# Patient Record
Sex: FEMALE | Race: WHITE | Employment: OTHER | URBAN - NONMETROPOLITAN AREA
[De-identification: names, ages, dates, MRNs, and addresses within clinical notes are randomized per-mention and may not be internally consistent; named-entity substitution may affect disease eponyms.]

---

## 2019-05-24 ENCOUNTER — HOSPITAL ENCOUNTER (OUTPATIENT)
Dept: PHYSICAL THERAPY | Age: 59
Setting detail: THERAPIES SERIES
Discharge: HOME | End: 2019-05-24
Payer: MEDICARE

## 2019-05-24 PROCEDURE — 97110 THERAPEUTIC EXERCISES: CPT

## 2019-05-24 PROCEDURE — 97116 GAIT TRAINING THERAPY: CPT

## 2019-05-24 PROCEDURE — 97161 PT EVAL LOW COMPLEX 20 MIN: CPT

## 2019-05-24 ASSESSMENT — PAIN SCALES - GENERAL: PAINLEVEL_OUTOF10: 7

## 2019-05-24 ASSESSMENT — PAIN DESCRIPTION - FREQUENCY: FREQUENCY: INTERMITTENT

## 2019-05-24 ASSESSMENT — PAIN DESCRIPTION - ORIENTATION: ORIENTATION: LEFT

## 2019-05-24 ASSESSMENT — PAIN DESCRIPTION - PAIN TYPE: TYPE: SURGICAL PAIN

## 2019-05-24 ASSESSMENT — PAIN DESCRIPTION - LOCATION: LOCATION: KNEE

## 2019-05-24 NOTE — FLOWSHEET NOTE
** PLEASE SIGN, DATE AND TIME CERTIFICATION BELOW AND RETURN TO [facility] OUTPATIENT REHABILITATION (FAX #: [phone]). ATTEST/CO-SIGN IF ACCESSING VIA INToldo. THANK YOU.**    I certify that I have examined the patient below and determined that Physical Medicine and Rehabilitation service is necessary and that I approve the established plan of care for up to 90 days or as specifically noted. Attestation, signature or co-signature of physician indicates approval of certification requirements.    ________________________ ____________ __________  Physician Signature   Date   Time       [address]     Time In: 2581  Time Out: 7351  Minutes: 43  Timed Code Treatment Minutes: 23 Minutes                Date: 2019  Patient Name: Carol Yun,  Gender:  female        CSN: [id number]   : 1960  (59 y.o.)        Referring Practitioner: Mary Jane Holbrook MD      Diagnosis: M17.12 (ICD-10-CM) - Unilateral primary osteoarthritis, left knee  Treatment Diagnosis: left  knee pain s/p arthroscopy with difficulty walking. Additional Pertinent Hx: comorbidities: COPD, chronic back pain, fibromyalgia, DDD,  emphysema, OA. left 19 for s/p left knee arthroscopy, PMM, AA, left patellar instability. 19        General:  PT Visit Information  Onset Date: 19  PT Insurance Information: Medicare unlimited visits based on medical necessity. aquatic yes modalities yes iontophoresis, HP and CP are not covered. no precertification  Total # of Visits to Date: 1  Plan of Care/Certification Expiration Date: 19  Progress Note Counter: 1/10 for PN           See Medical History Questionnaire for information related to allergies and medications. Subjective:  Chart Reviewed: Yes  Family / Caregiver Present: No  Comments:  Follow up with Dr. Agueda Hoover on 19      Subjective: Patient reports that she had left knee scope on 5/22/19. States that she has problems with knee for more than 5 years. She had Synvisc injections 5-6 years ago to left knee which helped. Has first knee scope 5 years ago. Notes that knee cap goes out of place at times. Patient had recent knee scope due to increased left knee pain and difficulty with walking, and doing activities around home. Difficulty sleeping. Now since scope, she has had some increased pain yesterday since surgery. Left knee pain today located front of knee, outer knee and back of knee. Limited sitting, standing and walking tolerance. Patient has noted any drainage through dressings. Walking with single crutch today       Pain:  Patient Currently in Pain: Yes  Pain Assessment: 0-10  Pain Level: 7  Pain Type: Surgical pain  Pain Location: Knee  Pain Orientation: Left  Pain Frequency: Intermittent     Social/Functional History:    Lives With: Alone  Type of Home: House  Home Layout: One level  Home Access: Ramped entrance  Home Equipment: Crutches     Bathroom Shower/Tub: Walk-in shower  Bathroom Toilet: Handicap height  Bathroom Equipment: Grab bars in shower  Bathroom Accessibility: Accessible       ADL Assistance: Independent  Homemaking Assistance: Independent  Ambulation Assistance: Independent  Transfer Assistance: Independent    Active : Yes  Mode of Transportation: Car  Occupation: Retired, On disability  Leisure & Hobbies: likes go garden, craft, watch movies, cook    Objective                            Observation/Palpation  Observation: Note no drainage at incisional sites. Note mild redness at lateral distal incisional site. Note stitches/sutures in place. Patient ambulates with single axillary crutch back to clinic. Edema: Note circumferential measurements: right knee 38 cm, left knee 39 cm at joint line. ROM RLE: Right knee flexion 0 degrees to 138 degrees flexion  ROM LLE: Left knee flexion  20 degrees to 100 degrees flexion.      Comment: Strength RLE WNLS 5/5 at hip, knee and ankle    Comment: Strength LLE hip flexors 3+/5, knee MMT not assessed due to recent surgery. Quad lag 20 degrees. ankle df 5/5. Tone RLE  RLE Tone: Normotonic  Tone LLE  LLE Tone: Normotonic       Special Tests: Negative Veronica's. Mild warmth at knee but no significant redness. No warmth at calf. Overall Sensation Status: WNL           Rolling to Left: Independent  Rolling to Right: Independent  Supine to Sit: Independent  Sit to Supine: Independent                Transfers  Sit to Stand: Modified independent  Stand to sit: Modified independent  Comment: Use of armrests of chair to sit<>stand. Patient does not flex left knee to wt shift fully through it. WB Status: WBAT LLE  Ambulation 1  Surface: carpet  Device: Forearm Crutches(single crutch)  Assistance: Modified Independent  Quality of Gait: Note decreased stance time through LLE, decreased knee flexion left knee through swing phase,   Distance: within clinic 60 feet, 40 feet x2      Stairs  # Steps : 4  Stairs Height: 6\"  Rails: Bilateral  Assistance: Modified independent   Comment: nonreciprocal pattern            Balance  Posture: Good  Sitting - Static: Good  Sitting - Dynamic: Good  Standing - Static: Fair, +  Standing - Dynamic: Poor  Comments: s/p arthroscopy 5/22/19  Uses single crutch for static standing and ambulation. Exercises  Exercise 1: Removed dressings. Note incisional sites healing well. No drainage. Exercise 2: ankle pumps, quad sets, gluteal sets x10 reps. Exercise 3: heelslides x5, ROM knee flexion 105 degrees . Exercise 4: SAQS x10   Exercise 5: assisted SLR 10% assist x5  (hip and groin pain) limited reps   Exercise 6: assisted hip abduction supinelying x5.  hip and groin pain limited reps. Exercise 7: seated knee flexion x10, flexion to 100 degrees. Exercise 8: ASsisted knee extension with opposite leg x5. Exercise 10: Gait training with single crutch.  Advised patient to place it Under right axilla instead of Left axilla  Exercise 11: Step training. Patient educated and demonstrated how to negotiate steps ascending and descending. SBA of one person first trial.  instructed in non reciprocal technque till strength , ROM improve. Activity Tolerance:  Activity Tolerance: Patient Tolerated treatment well, Patient limited by pain  Activity Tolerance: Patient had pain complaints at left hip and groin with SLR, walking, standing and did not tolerate supinelying well. Assessment: Body structures, Functions, Activity limitations: Decreased functional mobility , Decreased ADL status, Decreased ROM, Decreased strength, Decreased balance, Increased Pain  Assessment: 59year old female s/p left knee arthroscopy 5/22/19 diagnosed with meniscus tears and patellar instability. Patient having increased left hip and groin pain today. Did not tolerate SLR, hip abduction. Preferred to sit in chair vs supinelying on mat table. ROM 0-105 degrees flexion. left knee. Will continue 2x per week to address deficits in ROM, gait pattern/deviations, strength. Prognosis: Good  Discharge Recommendations: Continue to assess pending progress    Patient Education:  Patient Education: Patient educated in plan of care. Issued HEP of ex instructed in this session. Advised to use ice on left knee and elevate during day for swelling and pain. 3x per day 15-20 minutes. Do not aggravate left hip pain  Hold on ex that may aggravate her hip pain when doing the knee ex. Plan:  Times per week: 2x  Plan weeks: 8 weeks  Specific instructions for Next Treatment: s/p arthroscopy surgery 5/22/19 left knee. patellar instability also. work on [employer] with quad strengthening, ROM, balance.  (patient also having left hip pain with back pain, Inguinal and hip discomfort along with back pain) monitor pain symptoms.    Current Treatment Recommendations: Strengthening, ROM, Balance Training, Functional Mobility Training, Transfer Training, Stair training, Gait Training, Home Exercise Program, Pain Management    History: Personal factors or comorbidities that impact plan of care - High Complexity: 3 or more personal factors or comorbidities. See history section above for details. Examination: Body structures and functions, activity limitations, participation restrictions; using standardized tests and measures - Moderate Complexity: 3 or morebody structures and functional, activity limitations and/or participation restrictions. See restrictions and objective section above for details. Clinical Presentation: Moderate - Evolving with Changing Characteristics: Patient having hip and low back pain along with s/p arthroscopy left knee. Monitor these additional symptoms that can be aggravated with knee exercises. Decision Making: Low Complexity due to Patient s/p arthroscopy left knee demonstrating limited ROM, gait deviations and decreased strength. Decision making was based on patient assessment and decision making process in determining plan of care and establishing reasonable expectations for measurable functional outcomes. Evaluation Complexity: Based on the findings of patient history, examination, clinical presentation, and decision making during this evaluation, the evaluation of Gladys Pepper  is of low complexity. Goals:  Patient goals : Patient would like to be able to mow the lawn and do more yardwork without knee pain. Walk better. Short term goals  Time Frame for Short term goals: 4 weeks  Short term goal 1: Patient to report of decreased left knee pain from 7/10 to no greater than 3/10 with standing and walking, supinelying  Short term goal 2: Patient to demonstrate increased left knee ROM from 0 degrees to 130 degrees flexion with imporved transfer skill, gait pattern, negotiating steps.    Short term goal 3: Patient to demonstrate increased strength LLE with ability to complete SLR 4/5 muscle grades, increased knee stability with strength 4/5 with improved walking pattern. Short term goal 4: Patient to ambulate without single axillary crutch with equal weight shift, equal step length and knee flexion through swing phase when ambulating on level surfaces. Long term goals  Time Frame for Long term goals : 8 weeks  Long term goal 1: Patient independent in home ex program in order to meet above goals with increased ROM, strength, improved walking pattern to return to activities at home mowing lawn, negotiating step, and gardening.      Allyssa Lucas, [address]

## 2019-05-29 ENCOUNTER — HOSPITAL ENCOUNTER (OUTPATIENT)
Dept: PHYSICAL THERAPY | Age: 59
Setting detail: THERAPIES SERIES
Discharge: HOME | End: 2019-05-29
Payer: MEDICARE

## 2019-05-29 PROCEDURE — 97110 THERAPEUTIC EXERCISES: CPT

## 2019-05-29 ASSESSMENT — PAIN SCALES - GENERAL: PAINLEVEL_OUTOF10: 5

## 2019-05-29 ASSESSMENT — PAIN DESCRIPTION - ORIENTATION: ORIENTATION: LEFT

## 2019-05-29 ASSESSMENT — PAIN DESCRIPTION - PAIN TYPE: TYPE: SURGICAL PAIN

## 2019-05-29 ASSESSMENT — PAIN DESCRIPTION - LOCATION: LOCATION: KNEE

## 2019-05-29 NOTE — PROGRESS NOTES
[place]     Time In: 6486  Time Out: 0494  Minutes: 30  Timed Code Treatment Minutes: 30 Minutes           Date: 2019  Patient Name: Josh Jo,  Gender:  female        CSN: [id number]   : 1960  (59 y.o.)       Referring Practitioner: Tessy Flores MD      Diagnosis: M17.12 (ICD-10-CM) - Unilateral primary osteoarthritis, left knee  Treatment Diagnosis: left  knee pain s/p arthroscopy with difficulty walking. Additional Pertinent Hx: comorbidities: COPD, chronic back pain, fibromyalgia, DDD,  emphysema, OA. left 19 for s/p left knee arthroscopy, PMM, AA, left patellar instability. 19        General:  PT Visit Information  Onset Date: 19  PT Insurance Information: Medicare unlimited visits based on medical necessity. aquatic yes modalities yes iontophoresis, HP and CP are not covered. no precertification  Total # of Visits to Date: 2  Plan of Care/Certification Expiration Date: 19  Progress Note Counter: 2/10 for PN                 Subjective:  Chart Reviewed: Yes  Family / Caregiver Present: No  Comments: Follow up with Dr. Lenny Nicholson on 19      Subjective: Patient presents without assistive device stating her knee feels better, \" my balance is good and it doesn't hurt my knee to not use anything. \" States she has a \"nagging pain to left groin and hip. \"     Pain:  Patient Currently in Pain: Yes  Pain Assessment: 0-10  Pain Level: 5  Pain Type: Surgical pain  Pain Location: Knee  Pain Orientation: Left  Pain Radiating Towards: groin and hip  \"nagging pain. \"    Objective  Exercises  Exercise 3: heelslides x10, ROM knee flexion 130 degrees . Exercise 5: SLR x 10 L with no assist but reported discomfort but didn't want to stop. Exercise 6: assisted hip abduction supinelying x  10.       Exercise 12: Nustep S9, A9 L1 x 5 minutes  Exercise 13: Stretches at step knee flexion, hamstring and calf x 3 x 10 L. Exercise 14: Standing hel/toe raises, marches, squats, 3 way hip x 10  Exercise 15: Rockerboard x 2 directions B UE, balance x30 seconds 1 UE. Activity Tolerance: Tolerated well. Assessment: Body structures, Functions, Activity limitations: Decreased strength, Decreased endurance, Decreased functional mobility , Decreased ROM, Decreased balance  Assessment: Patient presented feeling much better. Intiated Nustep, stretches and standing ex with good tolerance. Significant gains with AROM left knee to 130. Pain after session 4/10. Discharge Recommendations: Continue to assess pending progress    Patient Education:  Patient Education: Standing ex. Continue with mat ex also. Plan:  Times per week: 2x  Plan weeks: 8 weeks  Specific instructions for Next Treatment: s/p arthroscopy surgery 5/22/19 left knee. patellar instability also. work on [employer] with quad strengthening, ROM, balance.  (patient also having left hip pain with back pain, Inguinal and hip discomfort along with back pain) monitor pain symptoms. Current Treatment Recommendations: Strengthening, ROM, Balance Training, Functional Mobility Training, Transfer Training, Stair training, Gait Training, Home Exercise Program, Pain Management    Goals:  Patient goals : Patient would like to be able to mow the lawn and do more yardwork without knee pain. Walk better. Short term goals  Time Frame for Short term goals: 4 weeks  Short term goal 1: Patient to report of decreased left knee pain from 7/10 to no greater than 3/10 with standing and walking, supinelying  Short term goal 2: Patient to demonstrate increased left knee ROM from 0 degrees to 130 degrees flexion with imporved transfer skill, gait pattern, negotiating steps.    Short term goal 3: Patient to demonstrate increased strength LLE with ability to complete SLR 4/5 muscle grades, increased knee stability with strength 4/5 with improved walking pattern. Short term goal 4: Patient to ambulate without single axillary crutch with equal weight shift, equal step length and knee flexion through swing phase when ambulating on level surfaces. Long term goals  Time Frame for Long term goals : 8 weeks  Long term goal 1: Patient independent in home ex program in order to meet above goals with increased ROM, strength, improved walking pattern to return to activities at home mowing lawn, negotiating step, and gardening.      Grady Greek  PTA [id number]

## 2019-05-31 ENCOUNTER — HOSPITAL ENCOUNTER (OUTPATIENT)
Dept: PHYSICAL THERAPY | Age: 59
Setting detail: THERAPIES SERIES
Discharge: HOME | End: 2019-05-31
Payer: MEDICARE

## 2019-05-31 PROCEDURE — 97110 THERAPEUTIC EXERCISES: CPT

## 2019-05-31 ASSESSMENT — PAIN SCALES - GENERAL: PAINLEVEL_OUTOF10: 5

## 2019-05-31 ASSESSMENT — PAIN DESCRIPTION - ORIENTATION: ORIENTATION: LEFT

## 2019-05-31 ASSESSMENT — PAIN DESCRIPTION - LOCATION: LOCATION: KNEE

## 2019-05-31 ASSESSMENT — PAIN DESCRIPTION - PAIN TYPE: TYPE: SURGICAL PAIN

## 2019-05-31 NOTE — PROGRESS NOTES
[place]     Time In: 0547  Time Out: Juan Antonio  Minutes: 38  Timed Code Treatment Minutes: 38 Minutes     Date: 2019  Patient Name: Ana Cristina Gimenez,  Gender:  female        CSN: [id number]   : 1960  (59 y.o.)       Referring Practitioner: Preeti Pacheco MD      Diagnosis: M17.12 (ICD-10-CM) - Unilateral primary osteoarthritis, left knee  Treatment Diagnosis: left  knee pain s/p arthroscopy with difficulty walking. Additional Pertinent Hx: comorbidities: COPD, chronic back pain, fibromyalgia, DDD,  emphysema, OA. left 19 for s/p left knee arthroscopy, PMM, AA, left patellar instability. 19        General:  PT Visit Information  Onset Date: 19  PT Insurance Information: Medicare unlimited visits based on medical necessity. aquatic yes modalities yes iontophoresis, HP and CP are not covered. no precertification  Total # of Visits to Date: 3  Plan of Care/Certification Expiration Date: 19  Progress Note Counter: 3/10 for PN                 Subjective:  Chart Reviewed: Yes  Patient assessed for rehabilitation services?: Yes  Family / Caregiver Present: No  Comments: Follow up with Dr. Silvio Chaves on 19      Subjective: Patient states her groin fels much better. Reports right lateral knee pain at 5/10. Presents using crutch but states, \" I trip over it but thought you wanted me to use it. \"      Pain:  Patient Currently in Pain: Yes  Pain Assessment: 0-10  Pain Level: 5  Pain Type: Surgical pain  Pain Location: Knee  Pain Orientation: Left  Pain Radiating Towards: left lateral knee. Objective            Exercises  Exercise 3: heelslides x10,  quad sets x 10, ROM knee flexion 134 degrees . Exercise 5: SLR x 10 L with mild groin pain no quad lag. Exercise 12: Nustep S9, A9 L1 x 5 minutes  Exercise 13: Stretches at step knee flexion, hamstring and calf x 3 x 10 L.    Exercise 14: Standing hel/toe raises, marches, squats,  ham curls, 3 way hip x 10  Exercise 15: Rockerboard x 2 directions B UE, balance x30 seconds 1 UE. Exercise 16: LAQ with ball between knees x 10  Exercise 17: Adductor squeeze with ball in hooklying  x 10 hold x 5  Exercise 18: Shallow wall squats with ball x 10 hold x 5. Exercise 19: CCRight step ups 4\" x 10 forward       Activity Tolerance: Tolerated well. Assessment: Body structures, Functions, Activity limitations: Decreased strength, Decreased ADL status, Decreased balance, Decreased ROM, Decreased endurance  Assessment: Patient continue to achieve gains with left knee flex. AROM 0 to 134. Added CCsteps ups and VMO strenthening ex. Tolerated well. Discharge Recommendations: Continue to assess pending progress    Patient Education:  Patient Education: Standing ex. Continue with mat ex also. Plan:  Times per week: 2x  Plan weeks: 8 weeks  Specific instructions for Next Treatment: s/p arthroscopy surgery 5/22/19 left knee. patellar instability also. work on [employer] with quad strengthening, ROM, balance.  (patient also having left hip pain with back pain, Inguinal and hip discomfort along with back pain) monitor pain symptoms. Current Treatment Recommendations: Strengthening, ROM, Balance Training, Functional Mobility Training, Transfer Training, Stair training, Gait Training, Home Exercise Program, Pain Management    Goals:  Patient goals : Patient would like to be able to mow the lawn and do more yardwork without knee pain. Walk better. Short term goals  Time Frame for Short term goals: 4 weeks  Short term goal 1: Patient to report of decreased left knee pain from 7/10 to no greater than 3/10 with standing and walking, supinelying  Short term goal 2: Patient to demonstrate increased left knee ROM from 0 degrees to 130 degrees flexion with imporved transfer skill, gait pattern, negotiating steps.    Short term goal 3: Patient to demonstrate increased strength LLE with ability to complete SLR 4/5 muscle grades, increased knee stability with strength 4/5 with improved walking pattern. Short term goal 4: Patient to ambulate without single axillary crutch with equal weight shift, equal step length and knee flexion through swing phase when ambulating on level surfaces. Long term goals  Time Frame for Long term goals : 8 weeks  Long term goal 1: Patient independent in home ex program in order to meet above goals with increased ROM, strength, improved walking pattern to return to activities at home mowing lawn, negotiating step, and gardening.      Kaitlynn Mercado  PTA [id number]

## 2019-06-03 ENCOUNTER — HOSPITAL ENCOUNTER (OUTPATIENT)
Dept: PHYSICAL THERAPY | Age: 59
Setting detail: THERAPIES SERIES
Discharge: HOME | End: 2019-06-03
Payer: MEDICARE

## 2019-06-03 PROCEDURE — 97110 THERAPEUTIC EXERCISES: CPT

## 2019-06-03 ASSESSMENT — PAIN SCALES - GENERAL: PAINLEVEL_OUTOF10: 5

## 2019-06-03 NOTE — PROGRESS NOTES
[facility]     Time In: 1115  Time Out: 1155  Minutes: 40  Timed Code Treatment Minutes: 40 Minutes     Date: 6/3/2019  Patient Name: Poppy Arellano,  Gender:  female        CSN: [id number]   : 1960  (59 y.o.)       Referring Practitioner: Gretta Wynn MD      Diagnosis: M17.12 (ICD-10-CM) - Unilateral primary osteoarthritis, left knee  Treatment Diagnosis: left  knee pain s/p arthroscopy with difficulty walking. Additional Pertinent Hx: comorbidities: COPD, chronic back pain, fibromyalgia, DDD,  emphysema, OA. left 19 for s/p left knee arthroscopy, PMM, AA, left patellar instability. 19        General:  PT Visit Information  Onset Date: 19  PT Insurance Information: Medicare unlimited visits based on medical necessity. aquatic yes modalities yes iontophoresis, HP and CP are not covered. no precertification  Total # of Visits to Date: 4  Plan of Care/Certification Expiration Date: 19  Progress Note Counter: 4/10 for PN               Subjective:  Chart Reviewed: Yes  Patient assessed for rehabilitation services?: Yes  Family / Caregiver Present: No  Comments: Follow up with Dr. Hilton Service on 19      Subjective: Patient reports she parked further away today and did ok walking. States she is doing well without her crutch. Pain:  Patient Currently in Pain: Yes  Pain Assessment: 0-10  Pain Level:  5(Left knee)    Objective    Exercises  Exercise 1: Nustep (Seat 9/Arms 9) Level 2 x6 minutes  Exercise 2: Stretches at step knee flexion, hamstring and calf 3x 15 seconds Left  Exercise 4: Demonstrated bridge with ball between knees and straight leg raises with external rotation for added VMO strengthening for HEP  Exercise 6: Standing heel/toe raises, marches, squats, hamstring curls, 3-way hip x15 each Bilateral   Exercise 7: Left Closed chain step ups 4\" forward/lateral x15 each  Exercise 8: Shallow wall squats with ball 10x hold 5 seconds. Exercise 9: Seated Adductor squeeze with ball 15x 5 second hold  Exercise 10: LAQ with ball between knees x15  Exercise 11: Rockerboard forward/back, left/right 15x each with 1 UE, balance x30 seconds 1 UE. Activity Tolerance:  Activity Tolerance: Patient Tolerated treatment well, Patient limited by pain    Assessment: Body structures, Functions, Activity limitations: Decreased strength, Decreased ADL status, Decreased balance, Decreased ROM, Decreased endurance  Assessment: Good tolerance of exercises today. Pain remained 5/10 at end of session and paitent denies any hip/groin pain. Discharge Recommendations: Continue to assess pending progress    Patient Education:  Patient Education: Continue HEP, added bridge with ball between knees, SLR with ER    Plan:  Times per week: 2x  Plan weeks: 8 weeks  Specific instructions for Next Treatment: s/p arthroscopy surgery 5/22/19 left knee. patellar instability also. work on [employer] with quad strengthening, ROM, balance.  (patient also having left hip pain with back pain, Inguinal and hip discomfort along with back pain) monitor pain symptoms. Current Treatment Recommendations: Strengthening, ROM, Balance Training, Functional Mobility Training, Transfer Training, Stair training, Gait Training, Home Exercise Program, Pain Management  Plan Comment: Continue with current POC    Goals:  Patient goals : Patient would like to be able to mow the lawn and do more yardwork without knee pain. Walk better. Short term goals  Time Frame for Short term goals: 4 weeks  Short term goal 1: Patient to report of decreased left knee pain from 7/10 to no greater than 3/10 with standing and walking, supinelying  Short term goal 2: Patient to demonstrate increased left knee ROM from 0 degrees to 130 degrees flexion with imporved transfer skill, gait pattern, negotiating steps.    Short term goal 3: Patient to demonstrate increased strength LLE with ability to complete SLR 4/5 muscle grades, increased knee stability with strength 4/5 with improved walking pattern. Short term goal 4: Patient to ambulate without single axillary crutch with equal weight shift, equal step length and knee flexion through swing phase when ambulating on level surfaces. Long term goals  Time Frame for Long term goals : 8 weeks  Long term goal 1: Patient independent in home ex program in order to meet above goals with increased ROM, strength, improved walking pattern to return to activities at home mowing lawn, negotiating step, and gardening. Foster [employer].  Kamille, [facility] [place], 6/3/2019

## 2019-06-06 ENCOUNTER — HOSPITAL ENCOUNTER (OUTPATIENT)
Dept: PHYSICAL THERAPY | Age: 59
Setting detail: THERAPIES SERIES
Discharge: HOME | End: 2019-06-06
Payer: MEDICARE

## 2019-06-06 PROCEDURE — 97110 THERAPEUTIC EXERCISES: CPT

## 2019-06-06 ASSESSMENT — PAIN DESCRIPTION - PAIN TYPE: TYPE: SURGICAL PAIN

## 2019-06-06 ASSESSMENT — PAIN DESCRIPTION - ORIENTATION: ORIENTATION: LEFT

## 2019-06-06 ASSESSMENT — PAIN DESCRIPTION - LOCATION: LOCATION: KNEE

## 2019-06-06 ASSESSMENT — PAIN SCALES - GENERAL: PAINLEVEL_OUTOF10: 5

## 2019-06-06 NOTE — PROGRESS NOTES
[place]     Time In: 5473  Time Out: 1524  Minutes: 39  Timed Code Treatment Minutes: 39 Minutes                Date: 2019  Patient Name: Jin Oshea,  Gender:  female        CSN: [id number]   : 1960  (59 y.o.)       Referring Practitioner: Theodore Hurt MD      Diagnosis: M17.12 (ICD-10-CM) - Unilateral primary osteoarthritis, left knee  Treatment Diagnosis: left  knee pain s/p arthroscopy with difficulty walking. Additional Pertinent Hx: comorbidities: COPD, chronic back pain, fibromyalgia, DDD,  emphysema, OA. left 19 for s/p left knee arthroscopy, PMM, AA, left patellar instability. 19                   General:  PT Visit Information  Onset Date: 19  PT Insurance Information: Medicare unlimited visits based on medical necessity. aquatic yes modalities yes iontophoresis, HP and CP are not covered. no precertification  Total # of Visits to Date: 5  Plan of Care/Certification Expiration Date: 19  Progress Note Counter: 5/10 for PN                 Subjective:  Chart Reviewed: Yes  Patient assessed for rehabilitation services?: Yes  Family / Caregiver Present: No  Comments: Follow up with Dr. Pricila Rea on 19      Subjective: It's not bad. 5/10. 0 AD. Pain:  Patient Currently in Pain: Yes  Pain Level: 5  Pain Type: Surgical pain  Pain Location: Knee  Pain Orientation: Left      Objective         Exercises  Exercise 1: Nustep (Seat 9/Arms 9) Level 2 x6 minutes  Exercise 2: Stretches at step knee flexion, hamstring and calf 3x 15 seconds Left  Exercise 6: Standing heel/toe raises, marches, squats, hamstring curls x15 on foam , 3-way hip x each Bilateral peach band  Exercise 7: Left Closed chain step ups 4\" forward/lateral x15 each  x10 eccentric   Exercise 8: Shallow wall squats with ball 10x hold 5 seconds. Pt not performing at home -\"I don't have a wall.  My doors don't with imporved transfer skill, gait pattern, negotiating steps. Short term goal 3: Patient to demonstrate increased strength LLE with ability to complete SLR 4/5 muscle grades, increased knee stability with strength 4/5 with improved walking pattern. Short term goal 4: Patient to ambulate without single axillary crutch with equal weight shift, equal step length and knee flexion through swing phase when ambulating on level surfaces. Long term goals  Time Frame for Long term goals : 8 weeks  Long term goal 1: Patient independent in home ex program in order to meet above goals with increased ROM, strength, improved walking pattern to return to activities at home mowing lawn, negotiating step, and gardening.      Charlene Rebolledo  [id number]

## 2019-06-10 ENCOUNTER — HOSPITAL ENCOUNTER (OUTPATIENT)
Dept: PHYSICAL THERAPY | Age: 59
Setting detail: THERAPIES SERIES
Discharge: HOME | End: 2019-06-10
Payer: MEDICARE

## 2019-06-10 PROCEDURE — 97110 THERAPEUTIC EXERCISES: CPT

## 2019-06-10 ASSESSMENT — PAIN SCALES - GENERAL: PAINLEVEL_OUTOF10: 3

## 2019-06-10 NOTE — DISCHARGE SUMMARY
strength LLE with ability to complete SLR 4/5 muscle grades, increased knee stability with strength 4/5 with improved walking pattern. GOAL MET Strength 4/5 SLR, 5/5 knee,  left. Short term goal 4: Patient to ambulate without single axillary crutch with equal weight shift, equal step length and knee flexion through swing phase when ambulating on level surfaces. GOAL MET Patient ambulating without assistive device and not normal gait pattern with equal wt shifts and step length. Long term goals  Time Frame for Long term goals : 8 weeks  Long term goal 1: Patient independent in home ex program in order to meet above goals with increased ROM, strength, improved walking pattern to return to activities at home mowing lawn, negotiating step, and gardening. GOAL MET Patient able to negotiate 8 steps with light touch on single handrail  reciprocally. Note patient walking better without gait deviations. Independent in [address] [place] [address].       [facility], [address] [facility]

## 2019-07-18 ENCOUNTER — HOSPITAL ENCOUNTER (OUTPATIENT)
Dept: PHYSICAL THERAPY | Age: 59
Setting detail: THERAPIES SERIES
Discharge: HOME | End: 2019-07-18
Payer: MEDICARE

## 2019-07-18 PROCEDURE — 97110 THERAPEUTIC EXERCISES: CPT

## 2019-07-18 PROCEDURE — 97161 PT EVAL LOW COMPLEX 20 MIN: CPT

## 2019-07-18 ASSESSMENT — PAIN SCALES - GENERAL: PAINLEVEL_OUTOF10: 8

## 2019-07-18 ASSESSMENT — PAIN DESCRIPTION - LOCATION: LOCATION: KNEE

## 2019-07-18 ASSESSMENT — PAIN DESCRIPTION - ORIENTATION: ORIENTATION: RIGHT

## 2019-07-18 ASSESSMENT — PAIN DESCRIPTION - PAIN TYPE: TYPE: SURGICAL PAIN

## 2019-07-18 NOTE — FLOWSHEET NOTE
** PLEASE SIGN, DATE AND TIME CERTIFICATION BELOW AND RETURN TO [facility] OUTPATIENT REHABILITATION (FAX #: [phone]). ATTEST/CO-SIGN IF ACCESSING VIA [employer]. THANK YOU.**    I certify that I have examined the patient below and determined that Physical Medicine and Rehabilitation service is necessary and that I approve the established plan of care for up to 90 days or as specifically noted. Attestation, signature or co-signature of physician indicates approval of certification requirements.    ________________________ ____________ __________  Physician Signature   Date   Time       [address]     Time In: 1210  Time Out: 1300  Minutes: 50  Timed Code Treatment Minutes: 10 Minutes           Eval     Date: 2019  Patient Name: Antwon Vega,  Gender:  female        CSN: [id number]   : 1960  (55 y.o.)        Referring Practitioner: Jacey Huddleston MD      Diagnosis: M17.11 (ICD-10-CM) - Unilateral primary osteoarthritis, right knee  Treatment Diagnosis: right knee pain s/p arthroscopy with difficulty walking, right leg weakness  Additional Pertinent Hx: Take it easy for first couple days after scope. Hx: COPD, chronic back pain, fibromyalgia, DDD,  emphysema, OA. left 19 for s/p left knee arthroscopy, PMM, AA, left patellar instability. Right knee scope/AA on 19. General:  PT Visit Information  Onset Date: 19  PT Insurance Information: Medicare - Unlimited visits. Aquatics and modalities except Ionto and CP/HP covered. Total # of Visits to Date: 1  Plan of Care/Certification Expiration Date: 19  Progress Note Counter: 1/10 for PN         See Medical History Questionnaire for information related to allergies and medications. Subjective:  Chart Reviewed: Yes  Patient assessed for rehabilitation services?: Yes  Family / Caregiver Present: No  Comments:  Follow up with Dr. Jasvir Almonte Balance  Comments: Patient with limited stance time on right due to recent surgery. Exercises  Exercise 1: Nu-step x6 minutes Level 2  Exercise 2: Educated on HEP: ankle pumps, heel slides, abd, quad sets, glut sets and SLR. Seated marching and LAQ and knee flexion. Activity Tolerance:  Activity Tolerance: Patient Tolerated treatment well    Assessment: Body structures, Functions, Activity limitations: Decreased strength, Decreased ADL status, Decreased balance, Decreased ROM, Decreased endurance, Decreased functional mobility , Increased Pain  Assessment: Patient with several areas mentioned above that can be addressed by tehrapy over 8 weeks. Patient moving much better with less pain after taking dressings off. Patient should respond well to therapy to build strength, gain back range and return to normal mobility. Prognosis: Good  Discharge Recommendations: Continue to assess pending progress    Patient Education:  Patient Education: POC and HEP. Ice as needed. Plan:  Times per week: 2x  Plan weeks: 8 weeks  Specific instructions for Next Treatment: leg strengthening, gait and balance, modalities as needed, start with Nu-step and progress to Airdyne  Current Treatment Recommendations: Strengthening, ROM, Balance Training, Functional Mobility Training, Transfer Training, Stair training, Gait Training, Home Exercise Program, Pain Management, Manual Therapy - Soft Tissue Mobilization, Neuromuscular Re-education  Plan Comment: Initiated POC    History: Personal factors or comorbidities that impact plan of care - High Complexity: 3 or more personal factors or comorbidities. See history section above for details. Examination: Body structures and functions, activity limitations, participation restrictions; using standardized tests and measures - High Complexity: 4 or more body structures and functional, activity limitations and/or participation restrictions.   See restrictions and objective section above for details. Clinical Presentation: Low - Stable and Uncomplicated: Patient healing from routine surgery and no complications    Decision Making: Low Complexity due to Patient should respond well to therapy for strengthening and gait. Decision making was based on patient assessment and decision making process in determining plan of care and establishing reasonable expectations for measurable functional outcomes. Evaluation Complexity: Based on the findings of patient history, examination, clinical presentation, and decision making during this evaluation, the evaluation of Gladys Pepper  is of low complexity. Goals:  Patient goals : To be able to get up and function to do housework and yardwork    Short term goals  Time Frame for Short term goals: 4 weeks  Short term goal 1: Patient to demonstrate continued full right knee extension and 140 degrees knee flexion for ease with dressing  Short term goal 2: Patient to ambulate with normal gait pattern without AD to return to all shopping  Short term goal 3: Patient to demonstrate 4+-5/5 throughout in right leg to return to all indoor and outdoor work  Short term goal 4: Patient to demonstrate all balance activity with no more than one hand assist for safety with community ambulation    Long term goals  Time Frame for Long term goals : 8 weeks  Long term goal 1: Patient to be independent with home program to be able to return to prior activity with minimal to no pain or difficulty and be able to care for sean.     [address], [address]

## 2019-07-22 ENCOUNTER — HOSPITAL ENCOUNTER (OUTPATIENT)
Dept: PHYSICAL THERAPY | Age: 59
Setting detail: THERAPIES SERIES
End: 2019-07-22
Payer: MEDICARE

## 2019-07-24 ENCOUNTER — HOSPITAL ENCOUNTER (OUTPATIENT)
Dept: PHYSICAL THERAPY | Age: 59
Setting detail: THERAPIES SERIES
Discharge: HOME | End: 2019-07-24
Payer: MEDICARE

## 2019-07-24 PROCEDURE — 97110 THERAPEUTIC EXERCISES: CPT

## 2019-07-24 ASSESSMENT — PAIN DESCRIPTION - PAIN TYPE: TYPE: SURGICAL PAIN

## 2019-07-24 ASSESSMENT — PAIN DESCRIPTION - LOCATION: LOCATION: KNEE

## 2019-07-24 ASSESSMENT — PAIN DESCRIPTION - ORIENTATION: ORIENTATION: RIGHT

## 2019-07-24 ASSESSMENT — PAIN SCALES - GENERAL: PAINLEVEL_OUTOF10: 8

## 2019-07-29 ENCOUNTER — HOSPITAL ENCOUNTER (OUTPATIENT)
Dept: PHYSICAL THERAPY | Age: 59
Setting detail: THERAPIES SERIES
Discharge: HOME | End: 2019-07-29
Payer: MEDICARE

## 2019-07-29 PROCEDURE — 97110 THERAPEUTIC EXERCISES: CPT

## 2019-07-29 NOTE — PROGRESS NOTES
[place]     Time In:   Time Out: Orlando Erickson  Minutes: 40  Timed Code Treatment Minutes: 40 Minutes                Date: 2019  Patient Name: ,  Gender:  female        CSN: [id number]   : 1960  (59 y.o.)       Referring Practitioner: Krysta Smith MD      Diagnosis: M17.11 (ICD-10-CM) - Unilateral primary osteoarthritis, right knee  Treatment Diagnosis: right knee pain s/p arthroscopy with difficulty walking, right leg weakness   Additional Pertinent Hx: Take it easy for first couple days after scope. Hx: COPD, chronic back pain, fibromyalgia, DDD,  emphysema, OA. left 19 for s/p left knee arthroscopy, PMM, AA, left patellar instability. Right knee scope/AA on 19. General:  PT Visit Information  PT Insurance Information: Medicare - Unlimited visits. Aquatics and modalities except Ionto and CP/HP covered. Total # of Visits to Date: 3  Plan of Care/Certification Expiration Date: 19  Canceled Appointment: 1  Progress Note Counter: 3/10 for PN               Subjective:  Chart Reviewed: Yes  Patient assessed for rehabilitation services?: Yes  Family / Caregiver Present: No  Comments: Follow up with Dr. Rojas Pinto on 19      Subjective: Patient reports the right knee is continuing to improve and feel better. Reports still has to be careful with how much she does but was able to mow the lawn this weekend. States still having trouble with left knee pain but nothing can be done right now.      Pain:  Patient Currently in Pain: No  Pain Radiating Towards: Left knee pain 5-6/10      Objective    Exercises  Exercise 1: Nu-step x6 minutes Level 2  Exercise 3:  Stretches at step for right hamstring, knee flexion, B calf stretch 3 x15 seconds each  Exercise 4: Standing heel/toe raises, marches, squats x15 each  Exercise 5: 3 way hip with peach band x10 B   Exercise 6:  Rockerboard

## 2019-07-31 ENCOUNTER — HOSPITAL ENCOUNTER (OUTPATIENT)
Dept: PHYSICAL THERAPY | Age: 59
Setting detail: THERAPIES SERIES
Discharge: HOME | End: 2019-07-31
Payer: MEDICARE

## 2019-07-31 PROCEDURE — 97110 THERAPEUTIC EXERCISES: CPT

## 2019-08-05 ENCOUNTER — HOSPITAL ENCOUNTER (OUTPATIENT)
Dept: PHYSICAL THERAPY | Age: 59
Setting detail: THERAPIES SERIES
Discharge: HOME | End: 2019-08-05
Payer: MEDICARE

## 2019-08-05 PROCEDURE — 97110 THERAPEUTIC EXERCISES: CPT

## 2019-08-05 NOTE — PROGRESS NOTES
way hip with orange band x15 B   Exercise 6:  Rockerboard 2 directions x20 each; balance each way x30 seconds each  Exercise 7: Step-ups forward and side Right leg only 15x each  Exercise 9: Lunges onto BOSU 10x bilat  Exercise 10: Hydrohip Level 3 10x then single leg Level 3 10x each  Exercise 11: Hydrostick B step stance 4 directions 15x each  Exercise 12: NK table Right knee flexion/extension 5# x10 each         Activity Tolerance:  Activity Tolerance: Patient Tolerated treatment well    Assessment: Body structures, Functions, Activity limitations: Decreased strength, Decreased ADL status, Decreased balance, Decreased ROM, Decreased endurance, Decreased functional mobility , Increased Pain  Assessment: Progressed resistance band with 3 way hip and hydrohip and increased reps with rockerboard, step ups, standing exercises and hydrostick with good tolerance. Patient reported catching in left knee during lunges. Patient denies pain but feeling muscle fatigue at end of session. Prognosis: Good  Discharge Recommendations: Continue to assess pending progress    Patient Education:  Patient Education: Continue with HEP. Issued orange tband for 3 way hip. Plan:  Times per week: 2x  Plan weeks: 8 weeks  Specific instructions for Next Treatment: leg strengthening, gait and balance, modalities as needed, start with Nu-step and progress to Airdyne  Current Treatment Recommendations: Strengthening, ROM, Balance Training, Functional Mobility Training, Transfer Training, Stair training, Gait Training, Home Exercise Program, Pain Management, Manual Therapy - Soft Tissue Mobilization, Neuromuscular Re-education  Plan Comment: Continue per POC. Goals:  Patient goals :  To be able to get up and function to do housework and yardwork    Short term goals  Time Frame for Short term goals: 4 weeks  Short term goal 1: Patient to demonstrate continued full right knee extension and 140 degrees knee flexion for ease with

## 2019-08-07 ENCOUNTER — HOSPITAL ENCOUNTER (OUTPATIENT)
Dept: PHYSICAL THERAPY | Age: 59
Setting detail: THERAPIES SERIES
Discharge: HOME | End: 2019-08-07
Payer: MEDICARE

## 2019-08-07 PROCEDURE — 97110 THERAPEUTIC EXERCISES: CPT

## 2019-08-12 ENCOUNTER — HOSPITAL ENCOUNTER (OUTPATIENT)
Dept: PHYSICAL THERAPY | Age: 59
Setting detail: THERAPIES SERIES
Discharge: HOME | End: 2019-08-12
Payer: MEDICARE

## 2019-08-12 PROCEDURE — 97110 THERAPEUTIC EXERCISES: CPT

## 2019-08-14 ENCOUNTER — HOSPITAL ENCOUNTER (OUTPATIENT)
Dept: PHYSICAL THERAPY | Age: 59
Setting detail: THERAPIES SERIES
Discharge: HOME | End: 2019-08-14
Payer: MEDICARE

## 2019-08-14 PROCEDURE — 97110 THERAPEUTIC EXERCISES: CPT

## 2020-03-11 ENCOUNTER — TELEPHONE (OUTPATIENT)
Dept: PHYSICAL MEDICINE AND REHAB | Age: 60
End: 2020-03-11

## 2020-03-11 ENCOUNTER — OFFICE VISIT (OUTPATIENT)
Dept: PHYSICAL MEDICINE AND REHAB | Age: 60
End: 2020-03-11
Payer: MEDICARE

## 2020-03-11 VITALS — DIASTOLIC BLOOD PRESSURE: 76 MMHG | HEIGHT: 66 IN | SYSTOLIC BLOOD PRESSURE: 130 MMHG | BODY MASS INDEX: 25.47 KG/M2

## 2020-03-11 PROCEDURE — 3017F COLORECTAL CA SCREEN DOC REV: CPT | Performed by: PHYSICAL MEDICINE & REHABILITATION

## 2020-03-11 PROCEDURE — G8484 FLU IMMUNIZE NO ADMIN: HCPCS | Performed by: PHYSICAL MEDICINE & REHABILITATION

## 2020-03-11 PROCEDURE — 99204 OFFICE O/P NEW MOD 45 MIN: CPT | Performed by: PHYSICAL MEDICINE & REHABILITATION

## 2020-03-11 PROCEDURE — G8419 CALC BMI OUT NRM PARAM NOF/U: HCPCS | Performed by: PHYSICAL MEDICINE & REHABILITATION

## 2020-03-11 PROCEDURE — 4004F PT TOBACCO SCREEN RCVD TLK: CPT | Performed by: PHYSICAL MEDICINE & REHABILITATION

## 2020-03-11 PROCEDURE — G8427 DOCREV CUR MEDS BY ELIG CLIN: HCPCS | Performed by: PHYSICAL MEDICINE & REHABILITATION

## 2020-03-11 RX ORDER — LISINOPRIL 5 MG/1
TABLET ORAL
COMMUNITY
Start: 2020-01-30

## 2020-03-11 RX ORDER — CYCLOBENZAPRINE HCL 10 MG
TABLET ORAL
COMMUNITY
Start: 2020-01-21

## 2020-03-11 RX ORDER — ALBUTEROL SULFATE 90 UG/1
AEROSOL, METERED RESPIRATORY (INHALATION)
COMMUNITY
Start: 2020-01-21

## 2020-03-11 RX ORDER — CHOLECALCIFEROL (VITAMIN D3) 125 MCG
CAPSULE ORAL
COMMUNITY
Start: 2020-01-21

## 2020-03-11 RX ORDER — SIMVASTATIN 20 MG
TABLET ORAL
COMMUNITY
Start: 2020-01-31

## 2020-03-11 RX ORDER — AMITRIPTYLINE HYDROCHLORIDE 25 MG/1
TABLET, FILM COATED ORAL
COMMUNITY
Start: 2020-01-21 | End: 2020-03-11

## 2020-03-11 NOTE — PROGRESS NOTES
Pain Management     [facility]  [address] [facility] [address]  [facility] [address]  Dept: [phone]  Loc: [address] [facility], MD Lawson Shea [facility], [address]       Chatom Kev is a 62 y.o. female, who came today due to  back pain    Cause of the symptom(s): degenerative (arthritis)    Onset: \"since I was 30\"     Quality of Symptoms: aching, throbbing, burning, shooting, numbness and tingling    Aggravating factors: lifting, twisting and bending forward    Relieving factors: lying down, bending, use of medication, exercise, rest and injection    Red Flags: pain at night, pain with lying down and osteoporosis    Treatment done: physical therapy, anti-inflammatory medication, muscle relaxant, steroid and opioid    Current pain level: 7 on the scale of 0-10 ( 10 being worst)     No spinal surgery       Allergies   Allergen Reactions    Nsaids Other (See Comments)     STOPPED BREATHING    Pcn [Penicillins] Other (See Comments)     DIZZINESS    Medrol [Methylprednisolone] Palpitations       Social History     Socioeconomic History    Marital status: Single     Spouse name: Not on file    Number of children: Not on file    Years of education: Not on file    Highest education level: Not on file   Occupational History    Not on file   Social Needs    Financial resource strain: Not on file    Food insecurity     Worry: Not on file     Inability: Not on file    Transportation needs     Medical: Not on file     Non-medical: Not on file   Tobacco Use    Smoking status: Current Every Day Smoker     Packs/day: 1.00     Types: Cigarettes    Smokeless tobacco: Never Used   Substance and Sexual Activity    Alcohol use: Yes     Comment: 2 BEERS A MONTH     Drug use: Not on file    Sexual activity: Not on file   Lifestyle    Physical activity     Days per week: Not on file     Minutes per session: Not on file    Stress: Not on file   Relationships    Social connections     Talks on phone: Not on file     Gets together: Not on file     Attends Jehovah's witness service: Not on file     Active member of club or organization: Not on file     Attends meetings of clubs or organizations: Not on file     Relationship status: Not on file    Intimate partner violence     Fear of current or ex partner: Not on file     Emotionally abused: Not on file     Physically abused: Not on file     Forced sexual activity: Not on file   Other Topics Concern    Not on file   Social History Narrative    Not on file       Past Medical History:   Diagnosis Date    Anxiety     Chronic back pain     [facility]) [facility] [address] [facility]     Depression     [facility]     Osteoarthritis        History reviewed. No pertinent surgical history. Prior to Visit Medications    Medication Sig Taking? Authorizing Provider   dilTIAZem (CARDIZEM) 30 MG tablet take 1 tablet by mouth twice a day Yes Historical Provider, MD   lisinopril (PRINIVIL;ZESTRIL) 5 MG tablet  Yes Historical Provider, MD   simvastatin (ZOCOR) 20 MG tablet take 1 tablet by mouth every evening Yes Historical Provider, MD   cyclobenzaprine (FLEXERIL) 10 MG tablet MAY TAKE 1 ONE TABLET EVERY 8 HOURS AS NEEDED FOR MUSCLE PAIN Yes Historical Provider, MD ANSARI VITAMIN D-3 125 MCG (5000 UT) CAPS capsule take 1 capsule by mouth once daily WITH SUPPER Yes Historical Provider, MD   calcium carbonate 1500 (600 Ca) MG TABS tablet  Yes Historical Provider, MD   albuterol sulfate  (90 Base) MCG/ACT inhaler inhale 2 puffs by mouth every 4 hours if needed for SPASMODIC COU. ..  (REFER TO PRESCRIPTION NOTES).  Yes Historical Provider, MD   alendronate (FOSAMAX) 70 MG tablet Take 1 tablet by mouth daily Yes Historical Provider, MD   ibuprofen (ADVIL;MOTRIN) 800 MG tablet Take 1 tablet by mouth 3 times daily Yes Historical Provider, MD   amitriptyline (ELAVIL) Schedule FF up  Office in 4 weeks       I have reviewed the chief complaint and HPI including the [facility] and Vital documentation by my staff and I agree with their documentation and have added where applicable. Time spent with patient was 45 minutes. More than 50% was spent counseling/coordinating the patient's care.        Juliocesar Randle MD   Spine Medicine/PM&R

## 2020-05-11 ENCOUNTER — HOSPITAL ENCOUNTER (OUTPATIENT)
Age: 60
Discharge: HOME | End: 2020-05-11
Payer: MEDICARE

## 2020-05-11 PROCEDURE — U0002 COVID-19 LAB TEST NON-CDC: HCPCS

## 2020-05-12 LAB
PERFORMING LAB: NORMAL
REPORT: NORMAL
SARS-COV-2: NOT DETECTED

## 2020-05-13 ENCOUNTER — TELEPHONE (OUTPATIENT)
Dept: PHYSICAL MEDICINE AND REHAB | Age: 60
End: 2020-05-13

## 2020-05-14 ENCOUNTER — HOSPITAL ENCOUNTER (OUTPATIENT)
Age: 60
Setting detail: OUTPATIENT SURGERY
Discharge: HOME | End: 2020-05-14
Attending: PHYSICAL MEDICINE & REHABILITATION | Admitting: PHYSICAL MEDICINE & REHABILITATION
Payer: MEDICARE

## 2020-05-14 ENCOUNTER — HOSPITAL ENCOUNTER (OUTPATIENT)
Dept: GENERAL RADIOLOGY | Age: 60
Setting detail: OUTPATIENT SURGERY
Discharge: HOME | End: 2020-05-14
Attending: PHYSICAL MEDICINE & REHABILITATION
Payer: MEDICARE

## 2020-05-14 VITALS
OXYGEN SATURATION: 99 % | SYSTOLIC BLOOD PRESSURE: 121 MMHG | WEIGHT: 164.4 LBS | HEART RATE: 85 BPM | BODY MASS INDEX: 26.42 KG/M2 | TEMPERATURE: 98.4 F | RESPIRATION RATE: 16 BRPM | HEIGHT: 66 IN | DIASTOLIC BLOOD PRESSURE: 82 MMHG

## 2020-05-14 PROCEDURE — 7100000011 HC PHASE II RECOVERY - ADDTL 15 MIN: Performed by: PHYSICAL MEDICINE & REHABILITATION

## 2020-05-14 PROCEDURE — 3600000055 HC PAIN LEVEL 3 ADDL 15 MIN: Performed by: PHYSICAL MEDICINE & REHABILITATION

## 2020-05-14 PROCEDURE — 6360000002 HC RX W HCPCS: Performed by: PHYSICAL MEDICINE & REHABILITATION

## 2020-05-14 PROCEDURE — 2709999900 HC NON-CHARGEABLE SUPPLY: Performed by: PHYSICAL MEDICINE & REHABILITATION

## 2020-05-14 PROCEDURE — 3600000054 HC PAIN LEVEL 3 BASE: Performed by: PHYSICAL MEDICINE & REHABILITATION

## 2020-05-14 PROCEDURE — 3209999900 FLUORO FOR SURGICAL PROCEDURES

## 2020-05-14 PROCEDURE — 64493 INJ PARAVERT F JNT L/S 1 LEV: CPT | Performed by: PHYSICAL MEDICINE & REHABILITATION

## 2020-05-14 PROCEDURE — 64494 INJ PARAVERT F JNT L/S 2 LEV: CPT | Performed by: PHYSICAL MEDICINE & REHABILITATION

## 2020-05-14 PROCEDURE — 99152 MOD SED SAME PHYS/QHP 5/>YRS: CPT | Performed by: PHYSICAL MEDICINE & REHABILITATION

## 2020-05-14 PROCEDURE — 2500000003 HC RX 250 WO HCPCS: Performed by: PHYSICAL MEDICINE & REHABILITATION

## 2020-05-14 PROCEDURE — 7100000010 HC PHASE II RECOVERY - FIRST 15 MIN: Performed by: PHYSICAL MEDICINE & REHABILITATION

## 2020-05-14 RX ORDER — LIDOCAINE HYDROCHLORIDE 10 MG/ML
INJECTION, SOLUTION INFILTRATION; PERINEURAL PRN
Status: DISCONTINUED | OUTPATIENT
Start: 2020-05-14 | End: 2020-05-14 | Stop reason: ALTCHOICE

## 2020-05-14 RX ORDER — METHYLPREDNISOLONE ACETATE 80 MG/ML
INJECTION, SUSPENSION INTRA-ARTICULAR; INTRALESIONAL; INTRAMUSCULAR; SOFT TISSUE PRN
Status: DISCONTINUED | OUTPATIENT
Start: 2020-05-14 | End: 2020-05-14 | Stop reason: ALTCHOICE

## 2020-05-14 RX ORDER — MIDAZOLAM HYDROCHLORIDE 1 MG/ML
INJECTION INTRAMUSCULAR; INTRAVENOUS PRN
Status: DISCONTINUED | OUTPATIENT
Start: 2020-05-14 | End: 2020-05-14 | Stop reason: ALTCHOICE

## 2020-05-14 RX ORDER — FENTANYL CITRATE 50 UG/ML
INJECTION, SOLUTION INTRAMUSCULAR; INTRAVENOUS PRN
Status: DISCONTINUED | OUTPATIENT
Start: 2020-05-14 | End: 2020-05-14 | Stop reason: ALTCHOICE

## 2020-05-14 ASSESSMENT — PAIN - FUNCTIONAL ASSESSMENT: PAIN_FUNCTIONAL_ASSESSMENT: 0-10

## 2020-05-14 ASSESSMENT — PAIN SCALES - GENERAL: PAINLEVEL_OUTOF10: 0

## 2020-05-14 NOTE — OP NOTE
None    Specimens:   * No specimens in log *    Implants:  * No implants in log *      Drains: * No LDAs found *    PLAN:     Home instructions were provided. The patient will follow up in 4 to 6 weeks or earlier if needed. Resident was present during the procedure but I performed 100% of the critical part of the procedure. I was present 100% of the time.        Electronically signed by Jose Angel Hernandez MD on 5/15/2020 at 12:31 PM

## 2020-05-14 NOTE — H&P
 COPD [facility]chronic obstructive [facility]) [facility]     Depression     Disc disorder     Emphysema of lung ([facility] [address] [facility]     Fibromyalgia     Osteoarthritis     Osteoporosis        Past Surgical History:   Procedure Laterality Date    KNEE ARTHROSCOPY Bilateral 2019    TONSILLECTOMY  1972       Prior to Visit Medications    Medication Sig Taking? Authorizing Provider   aspirin 81 MG tablet Take 81 mg by mouth daily Yes Historical Provider, MD   dilTIAZem (CARDIZEM) 30 MG tablet take 1 tablet by mouth twice a day Yes Historical Provider, MD   lisinopril (PRINIVIL;ZESTRIL) 5 MG tablet  Yes Historical Provider, MD   simvastatin (ZOCOR) 20 MG tablet take 1 tablet by mouth every evening Yes Historical Provider, MD   cyclobenzaprine (FLEXERIL) 10 MG tablet MAY TAKE 1 ONE TABLET EVERY 8 HOURS AS NEEDED FOR MUSCLE PAIN Yes Historical Provider, MD   albuterol sulfate  (90 Base) MCG/ACT inhaler inhale 2 puffs by mouth every 4 hours if needed for SPASMODIC COU. ..  (REFER TO PRESCRIPTION NOTES). Yes Historical Provider, MD   amitriptyline (ELAVIL) 50 MG tablet Take 1 tablet by mouth daily Yes Historical Provider, MD   methocarbamol (ROBAXIN) 750 MG tablet Take 1 tablet by mouth every 4 hours Yes Historical Provider, MD ANSARI VITAMIN D-3 125 MCG (5000 UT) CAPS capsule take 1 capsule by mouth once daily WITH SUPPER  Historical Provider, MD   calcium carbonate 1500 (600 Ca) MG TABS tablet   Historical Provider, MD   ibuprofen (ADVIL;MOTRIN) 800 MG tablet Take 1 tablet by mouth 3 times daily  Historical Provider, MD   Ascorbic Acid (VITAMIN C) 500 MG tablet Take by mouth daily  Historical Provider, MD   VITAMIN D, CHOLECALCIFEROL, PO Take 1 tablet by mouth daily  Historical Provider, MD         Review of Systems : All systems reviewed, all unremarkable other than HPI. No change since last visit. Physical Exam  Constitutional:       General: She is not in acute distress. Appearance: Normal appearance.  She is

## 2020-06-02 ENCOUNTER — VIRTUAL VISIT (OUTPATIENT)
Dept: PHYSICAL MEDICINE AND REHAB | Age: 60
End: 2020-06-02
Payer: MEDICARE

## 2020-06-02 PROCEDURE — 3017F COLORECTAL CA SCREEN DOC REV: CPT | Performed by: PHYSICAL MEDICINE & REHABILITATION

## 2020-06-02 PROCEDURE — G8428 CUR MEDS NOT DOCUMENT: HCPCS | Performed by: PHYSICAL MEDICINE & REHABILITATION

## 2020-06-02 PROCEDURE — 99213 OFFICE O/P EST LOW 20 MIN: CPT | Performed by: PHYSICAL MEDICINE & REHABILITATION

## 2020-06-02 NOTE — PROGRESS NOTES
VIRTUAL VISIT:       Kennedi Garcia MD    6/2/2020       Gladys Cheema is a 62 y.o. female, who came for a   post injection follow up      Post injection - bilateral L3-4 medial branch and L5 dorsal rami injection     At least 80% relief. No redness or swelling over the injection site. Can do her ADLs without difficulty.           Allergies   Allergen Reactions    Nsaids Other (See Comments)     STOPPED BREATHING    Pcn [Penicillins] Other (See Comments)     DIZZINESS    Medrol [Methylprednisolone] Palpitations       Social History     Socioeconomic History    Marital status:      Spouse name: Not on file    Number of children: Not on file    Years of education: Not on file    Highest education level: Not on file   Occupational History    Not on file   Social Needs    Financial resource strain: Not on file    Food insecurity     Worry: Not on file     Inability: Not on file    Transportation needs     Medical: Not on file     Non-medical: Not on file   Tobacco Use    Smoking status: Current Every Day Smoker     Packs/day: 1.00     Types: Cigarettes    Smokeless tobacco: Never Used   Substance and Sexual Activity    Alcohol use: Yes     Comment: 2 BEERS A MONTH     Drug use: Not on file    Sexual activity: Not on file   Lifestyle    Physical activity     Days per week: Not on file     Minutes per session: Not on file    Stress: Not on file   Relationships    Social connections     Talks on phone: Not on file     Gets together: Not on file     Attends [employer] service: Not on file     Active member of club or organization: Not on file     Attends meetings of clubs or organizations: Not on file     Relationship status: Not on file    Intimate partner violence     Fear of current or ex partner: Not on file     Emotionally abused: Not on file     Physically abused: Not on file     Forced sexual activity: Not on file   Other Topics Concern    Not on file   Social History Narrative    Not on file       Past Medical History:   Diagnosis Date    Anxiety     Chronic back pain     [facility]chronic [facility] disease) ([facility] [facility]     Depression     Disc disorder     Emphysema of lung ([facility]     Fibromyalgia     Osteoarthritis     Osteoporosis        Past Surgical History:   Procedure Laterality Date    FACET JOINT INJECTION Bilateral 5/14/2020    bilat. L3, L4 medial branch and L5 dorsal rami injection performed by Salvador Jay MD at [facility] ARTHROSCOPY Bilateral 2019    TONSILLECTOMY  1972       Family History   Problem Relation Age of Onset    Heart Disease Father     Diabetes Father     High Blood Pressure Father     COPD Father     Stroke Sister     Early Death Brother         Prior to Visit Medications    Medication Sig Taking? Authorizing Provider   aspirin 81 MG tablet Take 81 mg by mouth daily  Historical Provider, MD   dilTIAZem (CARDIZEM) 30 MG tablet take 1 tablet by mouth twice a day  Historical Provider, MD   lisinopril (PRINIVIL;ZESTRIL) 5 MG tablet   Historical Provider, MD   simvastatin (ZOCOR) 20 MG tablet take 1 tablet by mouth every evening  Historical Provider, MD   cyclobenzaprine (FLEXERIL) 10 MG tablet MAY TAKE 1 ONE TABLET EVERY 8 HOURS AS NEEDED FOR MUSCLE PAIN  Historical Provider, MD ANSARI VITAMIN D-3 125 MCG (5000 UT) CAPS capsule take 1 capsule by mouth once daily WITH SUPPER  Historical Provider, MD   calcium carbonate 1500 (600 Ca) MG TABS tablet   Historical Provider, MD   albuterol sulfate  (90 Base) MCG/ACT inhaler inhale 2 puffs by mouth every 4 hours if needed for SPASMODIC COU. ..  (REFER TO PRESCRIPTION NOTES).   Historical Provider, MD   ibuprofen (ADVIL;MOTRIN) 800 MG tablet Take 1 tablet by mouth 3 times daily  Historical Provider, MD   amitriptyline (ELAVIL) 50 MG tablet Take 1 tablet by mouth daily  Historical Provider, MD   methocarbamol (ROBAXIN) 750 MG tablet Take 1 tablet by mouth every 4 hours

## 2020-06-03 ENCOUNTER — TELEPHONE (OUTPATIENT)
Dept: PHYSICAL MEDICINE AND REHAB | Age: 60
End: 2020-06-03

## 2020-06-11 ENCOUNTER — HOSPITAL ENCOUNTER (OUTPATIENT)
Age: 60
Discharge: HOME | End: 2020-06-11
Payer: MEDICARE

## 2020-06-11 PROCEDURE — U0002 COVID-19 LAB TEST NON-CDC: HCPCS

## 2020-06-12 LAB
PERFORMING LAB: NORMAL
REPORT: NORMAL
SARS-COV-2: NOT DETECTED

## 2020-06-15 ENCOUNTER — TELEPHONE (OUTPATIENT)
Dept: PHYSICAL MEDICINE AND REHAB | Age: 60
End: 2020-06-15

## 2020-06-16 ENCOUNTER — TELEPHONE (OUTPATIENT)
Dept: PHYSICAL MEDICINE AND REHAB | Age: 60
End: 2020-06-16

## 2020-06-17 ENCOUNTER — HOSPITAL ENCOUNTER (OUTPATIENT)
Age: 60
Setting detail: OUTPATIENT SURGERY
Discharge: HOME | End: 2020-06-17
Attending: PHYSICAL MEDICINE & REHABILITATION | Admitting: PHYSICAL MEDICINE & REHABILITATION
Payer: MEDICARE

## 2020-06-17 ENCOUNTER — APPOINTMENT (OUTPATIENT)
Dept: GENERAL RADIOLOGY | Age: 60
End: 2020-06-17
Attending: PHYSICAL MEDICINE & REHABILITATION
Payer: MEDICARE

## 2020-06-17 VITALS
DIASTOLIC BLOOD PRESSURE: 81 MMHG | HEIGHT: 66 IN | HEART RATE: 94 BPM | RESPIRATION RATE: 18 BRPM | OXYGEN SATURATION: 98 % | WEIGHT: 162 LBS | TEMPERATURE: 97.8 F | SYSTOLIC BLOOD PRESSURE: 141 MMHG | BODY MASS INDEX: 26.03 KG/M2

## 2020-06-17 PROCEDURE — 99153 MOD SED SAME PHYS/QHP EA: CPT | Performed by: PHYSICAL MEDICINE & REHABILITATION

## 2020-06-17 PROCEDURE — 3209999900 FLUORO FOR SURGICAL PROCEDURES

## 2020-06-17 PROCEDURE — 6360000002 HC RX W HCPCS: Performed by: PHYSICAL MEDICINE & REHABILITATION

## 2020-06-17 PROCEDURE — 2709999900 HC NON-CHARGEABLE SUPPLY: Performed by: PHYSICAL MEDICINE & REHABILITATION

## 2020-06-17 PROCEDURE — 7100000011 HC PHASE II RECOVERY - ADDTL 15 MIN: Performed by: PHYSICAL MEDICINE & REHABILITATION

## 2020-06-17 PROCEDURE — 3600000052 HC PAIN LEVEL 2 BASE: Performed by: PHYSICAL MEDICINE & REHABILITATION

## 2020-06-17 PROCEDURE — 99152 MOD SED SAME PHYS/QHP 5/>YRS: CPT | Performed by: PHYSICAL MEDICINE & REHABILITATION

## 2020-06-17 PROCEDURE — 64491 INJ PARAVERT F JNT C/T 2 LEV: CPT | Performed by: PHYSICAL MEDICINE & REHABILITATION

## 2020-06-17 PROCEDURE — 64490 INJ PARAVERT F JNT C/T 1 LEV: CPT | Performed by: PHYSICAL MEDICINE & REHABILITATION

## 2020-06-17 PROCEDURE — 2500000003 HC RX 250 WO HCPCS: Performed by: PHYSICAL MEDICINE & REHABILITATION

## 2020-06-17 PROCEDURE — 7100000010 HC PHASE II RECOVERY - FIRST 15 MIN: Performed by: PHYSICAL MEDICINE & REHABILITATION

## 2020-06-17 PROCEDURE — 3600000053 HC PAIN LEVEL 2 ADDL 15 MIN: Performed by: PHYSICAL MEDICINE & REHABILITATION

## 2020-06-17 RX ORDER — TRIAMCINOLONE ACETONIDE 40 MG/ML
INJECTION, SUSPENSION INTRA-ARTICULAR; INTRAMUSCULAR PRN
Status: DISCONTINUED | OUTPATIENT
Start: 2020-06-17 | End: 2020-06-17 | Stop reason: ALTCHOICE

## 2020-06-17 RX ORDER — FENTANYL CITRATE 50 UG/ML
INJECTION, SOLUTION INTRAMUSCULAR; INTRAVENOUS PRN
Status: DISCONTINUED | OUTPATIENT
Start: 2020-06-17 | End: 2020-06-17 | Stop reason: ALTCHOICE

## 2020-06-17 RX ORDER — MIDAZOLAM HYDROCHLORIDE 1 MG/ML
INJECTION INTRAMUSCULAR; INTRAVENOUS PRN
Status: DISCONTINUED | OUTPATIENT
Start: 2020-06-17 | End: 2020-06-17 | Stop reason: ALTCHOICE

## 2020-06-17 RX ORDER — LIDOCAINE HYDROCHLORIDE 10 MG/ML
INJECTION, SOLUTION INFILTRATION; PERINEURAL PRN
Status: DISCONTINUED | OUTPATIENT
Start: 2020-06-17 | End: 2020-06-17 | Stop reason: ALTCHOICE

## 2020-06-17 ASSESSMENT — PAIN DESCRIPTION - DESCRIPTORS: DESCRIPTORS: ACHING

## 2020-06-17 ASSESSMENT — PAIN - FUNCTIONAL ASSESSMENT: PAIN_FUNCTIONAL_ASSESSMENT: 0-10

## 2020-06-17 NOTE — OP NOTE
Operative Note      Patient: Svetlana Simms  YOB: 1960  MRN: [id number]    Date of Procedure: 6/17/2020    Pre-Op Diagnosis: cervical spondylosis     Post-Op Diagnosis: Same       Procedure(s):  bilateral C5-6-7 medial branch block    Surgeon(s):  Rosalinda Salomon MD    Assistant:   * No surgical staff found *      CONSENT:     The risks, benefits, alternatives, plan, complications, and personnel were discussed prior to the procedure. The patient understood and agreed to proceed. PROCEDURE:     Anesthesia: Moderate sedation using 2 mg IV versed & 100 mcg IV fentanyl. The patient was positioned supine. Sign-in and time-out was performed. Identifying the site, in this case, both sides cervical  5, C6 and C7. Sterile technique was done in the usual manner using chlorhexidine. This was followed by infiltration of a 10 ml of 1% preservative-free lidocaine. A 2 inch  22-gauge spinal needle was positioned under fluoroscopic guidance. Upon confirmation that the needle was properly positioned, 0.5 cc of 240 mg of Omnipaque was injected. This was followed by injecting a solution of 80 mg of triamcinolone and  6 ml of 1% preservative-free lidocaine was injected without difficulty. EBL: 0 ml    Moderate Sedation Time: > 15 minutes    Patient tolerated the procedure well and went to recovery room with stable vital signs. PLAN:     Home instructions were provided. The patient will follow up in 4 to 6 weeks or earlier if needed.        Electronically signed by Rosalinda Salomon MD on 6/17/2020 at 10:22 AM

## 2020-06-17 NOTE — SEDATION DOCUMENTATION
Conscious Sedation Pre and Post Procedure Note    Indication: procedural pain management    Consent: I have discussed with the patient and/or the patient representative the indication, alternatives, and the possible risks and/or complications of the planned procedure and the anesthesia methods. The patient and/or patient representative appear to understand and agree to proceed. Physician Involvement: The attending physician was present and supervising this procedure. Pre-Sedation Documentation and Exam: I have personally completed a history, physical exam & review of systems for this patient (see notes). Airway Assessment: Mallampati Class I - (soft palate, fauces, uvula & anterior/posterior tonsillar pillars are visible)    Prior History of Anesthesia Complications: none    ASA Classification: Class 2 - A normal healthy patient with mild systemic disease    Sedation/ Anesthesia Plan: intravenous sedation    Medications Used: midazolam (Versed) intravenously and fentanyl intravenously    Monitoring and Safety: The patient was placed on a cardiac monitor and vital signs, pulse oximetry and level of consciousness were continuously evaluated throughout the procedure. The patient was closely monitored until recovery from the medications was complete and the patient had returned to baseline status. Respiratory therapy was on standby at all times during the procedure. BP (!) 141/81   Pulse 94   Temp 97.8 °F (36.6 °C) (Infrared)   Resp 18   Ht 5' 6\" (1.676 m)   Wt 162 lb (73.5 kg)   SpO2 98%   BMI 26.15 kg/m²       (The following sections must be completed)    Post-Sedation Vital Signs: Vital signs were reviewed and were stable after the procedure (see flow sheet for vitals)            Post-Sedation Exam: Neurologically intact. No cardiovascular compromise post injection.               Complications: none

## 2020-06-17 NOTE — H&P
Murrel Alpers, MD      Gladys Diggs is a 62 y.o. female, who came in for a procedure,   Bilateral C5-6 7 medial branch block     Symptoms: aching, throbbing.  Mostly axial pain     Aggravating factors: lifting, twisting and bending forward     Relieving factors: rest     Treatment done: physical therapy, anti-inflammatory medication and muscle relaxant    Allergies   Allergen Reactions    Nsaids Other (See Comments)     STOPPED BREATHING    Pcn [Penicillins] Other (See Comments)     DIZZINESS    Medrol [Methylprednisolone] Palpitations       Social History     Socioeconomic History    Marital status:      Spouse name: Not on file    Number of children: Not on file    Years of education: Not on file    Highest education level: Not on file   Occupational History    Not on file   Social Needs    Financial resource strain: Not on file    Food insecurity     Worry: Not on file     Inability: Not on file    Transportation needs     Medical: Not on file     Non-medical: Not on file   Tobacco Use    Smoking status: Current Every Day Smoker     Packs/day: 1.00     Types: Cigarettes    Smokeless tobacco: Never Used   Substance and Sexual Activity    Alcohol use: Yes     Comment: 2 BEERS A MONTH     Drug use: Not on file    Sexual activity: Not on file   Lifestyle    Physical activity     Days per week: Not on file     Minutes per session: Not on file    Stress: Not on file   Relationships    Social connections     Talks on phone: Not on file     Gets together: Not on file     Attends V[employer] service: Not on file     Active member of club or organization: Not on file     Attends meetings of clubs or organizations: Not on file     Relationship status: Not on file    Intimate partner violence     Fear of current or ex partner: Not on file     Emotionally abused: Not on file     Physically abused: Not on file     Forced sexual activity: Not on file   Other Topics Concern    Not on file

## 2020-07-02 ENCOUNTER — OFFICE VISIT (OUTPATIENT)
Dept: PHYSICAL MEDICINE AND REHAB | Age: 60
End: 2020-07-02
Payer: MEDICARE

## 2020-07-02 VITALS
BODY MASS INDEX: 26.03 KG/M2 | HEIGHT: 66 IN | DIASTOLIC BLOOD PRESSURE: 74 MMHG | HEART RATE: 68 BPM | TEMPERATURE: 97.1 F | SYSTOLIC BLOOD PRESSURE: 122 MMHG | WEIGHT: 162 LBS

## 2020-07-02 PROCEDURE — G8419 CALC BMI OUT NRM PARAM NOF/U: HCPCS | Performed by: PHYSICAL MEDICINE & REHABILITATION

## 2020-07-02 PROCEDURE — 3017F COLORECTAL CA SCREEN DOC REV: CPT | Performed by: PHYSICAL MEDICINE & REHABILITATION

## 2020-07-02 PROCEDURE — G8427 DOCREV CUR MEDS BY ELIG CLIN: HCPCS | Performed by: PHYSICAL MEDICINE & REHABILITATION

## 2020-07-02 PROCEDURE — 4004F PT TOBACCO SCREEN RCVD TLK: CPT | Performed by: PHYSICAL MEDICINE & REHABILITATION

## 2020-07-02 PROCEDURE — 99214 OFFICE O/P EST MOD 30 MIN: CPT | Performed by: PHYSICAL MEDICINE & REHABILITATION

## 2020-07-02 NOTE — PROGRESS NOTES
file     Physically abused: Not on file     Forced sexual activity: Not on file   Other Topics Concern    Not on file   Social History Narrative    Not on file       Past Medical History:   Diagnosis Date    Anxiety     Chronic back pain     [facility] [address] [facility]     Depression     Disc disorder     Emphysema of lung [facility] [address]     Fibromyalgia     Osteoarthritis     Osteoporosis        Past Surgical History:   Procedure Laterality Date    FACET JOINT INJECTION Bilateral 5/14/2020    bilat. L3, L4 medial branch and L5 dorsal rami injection performed by Karlo Hernandez MD at [facility] 9 Bilateral 6/17/2020    bilateral C5-6-7 medial branch block performed by Karlo Hernandez MD at [address] [place] [address] ARTHROSCOPY Bilateral 2019    TONSILLECTOMY  1972       Family History   Problem Relation Age of Onset    Heart Disease Father     Diabetes Father     High Blood Pressure Father     COPD Father     Stroke Sister     Early Death Brother         Prior to Visit Medications    Medication Sig Taking? Authorizing Provider   aspirin 81 MG tablet Take 81 mg by mouth daily Yes Historical Provider, MD   dilTIAZem (CARDIZEM) 30 MG tablet take 1 tablet by mouth twice a day Yes Historical Provider, MD   lisinopril (PRINIVIL;ZESTRIL) 5 MG tablet  Yes Historical Provider, MD   simvastatin (ZOCOR) 20 MG tablet take 1 tablet by mouth every evening Yes Historical Provider, MD   cyclobenzaprine (FLEXERIL) 10 MG tablet MAY TAKE 1 ONE TABLET EVERY 8 HOURS AS NEEDED FOR MUSCLE PAIN Yes Historical Provider, MD ANSARI VITAMIN D-3 125 MCG (5000 UT) CAPS capsule take 1 capsule by mouth once daily WITH SUPPER Yes Historical Provider, MD   calcium carbonate 1500 (600 Ca) MG TABS tablet  Yes Historical Provider, MD   albuterol sulfate  (90 Base) MCG/ACT inhaler inhale 2 puffs by mouth every 4 hours if needed for SPASMODIC COU. ..  (REFER TO PRESCRIPTION NOTES). Yes Historical Provider, MD   ibuprofen (ADVIL;MOTRIN) 800 MG tablet Take 1 tablet by mouth 3 times daily Yes Historical Provider, MD   amitriptyline (ELAVIL) 50 MG tablet Take 1 tablet by mouth daily Yes Historical Provider, MD   Ascorbic Acid (VITAMIN C) 500 MG tablet Take by mouth daily Yes Historical Provider, MD   VITAMIN D, CHOLECALCIFEROL, PO Take 1 tablet by mouth daily Yes Historical Provider, MD         Review of Systems      /74 (Site: Left Upper Arm, Position: Sitting, Cuff Size: Medium Adult)   Pulse 68   Temp 97.1 °F (36.2 °C) (Temporal)   Ht 5' 6\" (1.676 m)   Wt 162 lb (73.5 kg)   BMI 26.15 kg/m²       Physical Exam  Constitutional:       General: She is not in acute distress. Appearance: She is obese. HENT:      Head: Normocephalic and atraumatic. Pulmonary:      Effort: Pulmonary effort is normal. No respiratory distress. Musculoskeletal:         General: Tenderness present. Comments: Lumbar facet loading    Neurological:      Mental Status: She is alert and oriented to person, place, and time. Psychiatric:         Behavior: Behavior normal.           Assessment and Plan:      Diagnosis Orders   1. Lumbar spondylosis         For the low back - recommend second bilateral L3-4 medial branch L5 dorsal rami. If she responded well, will consider RFA. Schedule injection in 2 weeks     Schedule ff up post injection virtually 2 weeks post op       All questions were answered and imaging studies reviewed with the patient. I have reviewed the chief complaint and HPI including the [facility] and Vital documentation by my staff and I agree with their documentation and have added where applicable. Time spent with patient was 25 minutes. More than 50% was spent counseling/coordinating the patient's care. Nikko Hatch MD   Spine Medicine/PM&R                 [facility]  [address]  Dept: [phone]  Dept Fax: [phone]: [phone]    Visit Date: 7/2/2020    Functionality Assessment/Goals Worksheet     On a scale of 0 (Does not Interfere) to 10 (Completely Interferes)     1. Which number describes how during the past week pain has interfered with       the following:  A. General Activity:  5  B. Mood: 5  C. Walking Ability:  8  D. Normal Work (Includes both work outside the home and housework):  8  E. Relations with Other People:   5  F. Sleep:   6  G. Enjoyment of Life:   6    2. Patient Prefers to Take their Pain Medications:     [x]  On a regular basis   []  Only when necessary    []  Does not take pain medications    3. What are the Patient's Goals/Expectations for Visiting Pain Management?      []  Learn about my pain    []  Receive Medication   []  Physical Therapy     []  Treat Depression   [x]  Receive Injections    []  Treat Sleep   []  Deal with Anxiety and Stress   []  Treat Opoid Dependence/Addiction   []  Other:

## 2020-07-17 ENCOUNTER — HOSPITAL ENCOUNTER (OUTPATIENT)
Age: 60
Discharge: HOME | End: 2020-07-17
Payer: MEDICARE

## 2020-07-17 PROCEDURE — U0003 INFECTIOUS AGENT DETECTION BY NUCLEIC ACID (DNA OR RNA); SEVERE ACUTE RESPIRATORY SYNDROME CORONAVIRUS 2 (SARS-COV-2) (CORONAVIRUS DISEASE [COVID-19]), AMPLIFIED PROBE TECHNIQUE, MAKING USE OF HIGH THROUGHPUT TECHNOLOGIES AS DESCRIBED BY CMS-2020-01-R: HCPCS

## 2020-07-19 LAB — SARS-COV-2: NOT DETECTED

## 2020-07-20 ENCOUNTER — TELEPHONE (OUTPATIENT)
Dept: PHYSICAL MEDICINE AND REHAB | Age: 60
End: 2020-07-20

## 2020-07-22 ENCOUNTER — APPOINTMENT (OUTPATIENT)
Dept: GENERAL RADIOLOGY | Age: 60
End: 2020-07-22
Attending: PHYSICAL MEDICINE & REHABILITATION
Payer: MEDICARE

## 2020-07-22 ENCOUNTER — HOSPITAL ENCOUNTER (OUTPATIENT)
Age: 60
Setting detail: OUTPATIENT SURGERY
Discharge: HOME | End: 2020-07-22
Attending: PHYSICAL MEDICINE & REHABILITATION | Admitting: PHYSICAL MEDICINE & REHABILITATION
Payer: MEDICARE

## 2020-07-22 VITALS
DIASTOLIC BLOOD PRESSURE: 77 MMHG | HEART RATE: 86 BPM | RESPIRATION RATE: 16 BRPM | BODY MASS INDEX: 25.55 KG/M2 | HEIGHT: 66 IN | WEIGHT: 159 LBS | TEMPERATURE: 97.8 F | SYSTOLIC BLOOD PRESSURE: 131 MMHG | OXYGEN SATURATION: 95 %

## 2020-07-22 PROCEDURE — 7100000011 HC PHASE II RECOVERY - ADDTL 15 MIN: Performed by: PHYSICAL MEDICINE & REHABILITATION

## 2020-07-22 PROCEDURE — 2709999900 HC NON-CHARGEABLE SUPPLY: Performed by: PHYSICAL MEDICINE & REHABILITATION

## 2020-07-22 PROCEDURE — 6360000002 HC RX W HCPCS: Performed by: PHYSICAL MEDICINE & REHABILITATION

## 2020-07-22 PROCEDURE — 64494 INJ PARAVERT F JNT L/S 2 LEV: CPT | Performed by: PHYSICAL MEDICINE & REHABILITATION

## 2020-07-22 PROCEDURE — 2500000003 HC RX 250 WO HCPCS: Performed by: PHYSICAL MEDICINE & REHABILITATION

## 2020-07-22 PROCEDURE — 99152 MOD SED SAME PHYS/QHP 5/>YRS: CPT | Performed by: PHYSICAL MEDICINE & REHABILITATION

## 2020-07-22 PROCEDURE — 7100000010 HC PHASE II RECOVERY - FIRST 15 MIN: Performed by: PHYSICAL MEDICINE & REHABILITATION

## 2020-07-22 PROCEDURE — 3209999900 FLUORO FOR SURGICAL PROCEDURES

## 2020-07-22 PROCEDURE — 3600000054 HC PAIN LEVEL 3 BASE: Performed by: PHYSICAL MEDICINE & REHABILITATION

## 2020-07-22 PROCEDURE — 64493 INJ PARAVERT F JNT L/S 1 LEV: CPT | Performed by: PHYSICAL MEDICINE & REHABILITATION

## 2020-07-22 RX ORDER — LIDOCAINE HYDROCHLORIDE 10 MG/ML
INJECTION, SOLUTION EPIDURAL; INFILTRATION; INTRACAUDAL; PERINEURAL PRN
Status: DISCONTINUED | OUTPATIENT
Start: 2020-07-22 | End: 2020-07-22 | Stop reason: ALTCHOICE

## 2020-07-22 RX ORDER — TRIAMCINOLONE ACETONIDE 40 MG/ML
INJECTION, SUSPENSION INTRA-ARTICULAR; INTRAMUSCULAR PRN
Status: DISCONTINUED | OUTPATIENT
Start: 2020-07-22 | End: 2020-07-22 | Stop reason: ALTCHOICE

## 2020-07-22 RX ORDER — MIDAZOLAM HYDROCHLORIDE 1 MG/ML
INJECTION INTRAMUSCULAR; INTRAVENOUS PRN
Status: DISCONTINUED | OUTPATIENT
Start: 2020-07-22 | End: 2020-07-22 | Stop reason: ALTCHOICE

## 2020-07-22 RX ORDER — FENTANYL CITRATE 50 UG/ML
INJECTION, SOLUTION INTRAMUSCULAR; INTRAVENOUS PRN
Status: DISCONTINUED | OUTPATIENT
Start: 2020-07-22 | End: 2020-07-22 | Stop reason: ALTCHOICE

## 2020-07-22 ASSESSMENT — PAIN DESCRIPTION - DESCRIPTORS: DESCRIPTORS: BURNING

## 2020-07-22 ASSESSMENT — PAIN - FUNCTIONAL ASSESSMENT: PAIN_FUNCTIONAL_ASSESSMENT: 0-10

## 2020-07-22 NOTE — SEDATION DOCUMENTATION
Conscious Sedation Pre and Post Procedure Note    Indication: procedural pain management    Consent: I have discussed with the patient and/or the patient representative the indication, alternatives, and the possible risks and/or complications of the planned procedure and the anesthesia methods. The patient and/or patient representative appear to understand and agree to proceed. Physician Involvement: The attending physician was present and supervising this procedure. Pre-Sedation Documentation and Exam: I have personally completed a history, physical exam & review of systems for this patient (see notes). Airway Assessment: Mallampati Class II - (soft palate, fauces & uvula are visible)    Prior History of Anesthesia Complications: none    ASA Classification: Class 2 - A normal healthy patient with mild systemic disease    Sedation/ Anesthesia Plan: intravenous sedation    Medications Used: midazolam (Versed) intravenously and fentanyl intravenously    Monitoring and Safety: The patient was placed on a cardiac monitor and vital signs, pulse oximetry and level of consciousness were continuously evaluated throughout the procedure. The patient was closely monitored until recovery from the medications was complete and the patient had returned to baseline status. Respiratory therapy was on standby at all times during the procedure. (The following sections must be completed)    Post-Sedation Vital Signs: Vital signs were reviewed and were stable after the procedure (see flow sheet for vitals)            Post-Sedation Exam: Neurologically intact. No cardiovascular compromise post injection.             Complications: none

## 2020-07-22 NOTE — PROGRESS NOTES
1051- Pt arrived to  PACU phase 2, Alisa GARLAND at bedside for report. Pt hooked up to monitor, VSS, pt breathing deeply on room air. 1054-Pt given a snack. [address] called.    1104- IV removed, pt has had instructions pre op, denies questions,   1110- PT discharged walked to car with this RN

## 2020-07-22 NOTE — H&P
Past Medical History:   Diagnosis Date    Anxiety     Chronic back pain     [facility]     Depression     Disc disorder     Em[address]ma of lung ([address]     Fibromyalgia     Osteoarthritis     Osteoporosis        Past Surgical History:   Procedure Laterality Date    FACET JOINT INJECTION Bilateral 5/14/2020    bilat. L3, L4 medial branch and L5 dorsal rami injection performed by Marina Valdez MD at [facility] 9 Bilateral 6/17/2020    bilateral C5-6-7 medial branch block performed by Marina Valdez MD at [facility] ARTHROSCOPY Bilateral 2019   [facility]       Prior to Visit Medications    Medication Sig Taking? Authorizing Provider   dilTIAZem (CARDIZEM) 30 MG tablet take 1 tablet by mouth twice a day Yes Historical Provider, MD   lisinopril (PRINIVIL;ZESTRIL) 5 MG tablet  Yes Historical Provider, MD   simvastatin (ZOCOR) 20 MG tablet take 1 tablet by mouth every evening Yes Historical Provider, MD   albuterol sulfate  (90 Base) MCG/ACT inhaler inhale 2 puffs by mouth every 4 hours if needed for SPASMODIC COU. ..  (REFER TO PRESCRIPTION NOTES).  Yes Historical Provider, MD   amitriptyline (ELAVIL) 50 MG tablet Take 1 tablet by mouth daily Yes Historical Provider, MD   aspirin 81 MG tablet Take 81 mg by mouth daily  Historical Provider, MD   cyclobenzaprine (FLEXERIL) 10 MG tablet MAY TAKE 1 ONE TABLET EVERY 8 HOURS AS NEEDED FOR MUSCLE PAIN  Historical Provider, MD ANSARI VITAMIN D-3 125 MCG (5000 UT) CAPS capsule take 1 capsule by mouth once daily WITH SUPPER  Historical Provider, MD   calcium carbonate 1500 (600 Ca) MG TABS tablet   Historical Provider, MD   ibuprofen (ADVIL;MOTRIN) 800 MG tablet Take 1 tablet by mouth 3 times daily  Historical Provider, MD   Ascorbic Acid (VITAMIN C) 500 MG tablet Take by mouth daily  Historical Provider, MD   VITAMIN D, CHOLECALCIFEROL, PO Take 1 tablet by mouth daily  Historical Provider, MD         Review of Systems : All systems reviewed, all unremarkable other than HPI. No change since last visit. Physical Exam  HENT:      Head: Atraumatic. Cardiovascular:      Rate and Rhythm: Normal rate. Pulmonary:      Effort: Pulmonary effort is normal. No respiratory distress. Neurological:      Mental Status: She is alert and oriented to person, place, and time. Psychiatric:         Behavior: Behavior normal.       Cervical Spine Exam: Full ROM, without limitation     Access: Adequate mouth opening       Assessment:   Spondylosis    PLAN:     As advertised. Planned duration of treatment: 4 weeks. Further assessment and followup in  4 weeks for follow up post injection.        Electronically signed by Casandra Worley MD on 7/22/2020 at 10:31 AM

## 2020-07-22 NOTE — OP NOTE
Operative Note      Patient: Mary Allison  YOB: 1960  MRN: [id number]    Date of Procedure: 7/22/2020    Pre-Op Diagnosis: Lumbar spondylosis    Post-Op Diagnosis: Same       Procedure(s):  Bilateral L3-4 medial branch L5 dorsal rami    Surgeon(s):  Luis Hu MD    Assistant:   * No surgical staff found *    Anesthesia: Moderate sedation using 2 mg IV versed & 100 mcg IV fentanyl. CONSENT:     The risks, benefits, alternatives, plan, complications, and personnel were discussed prior to the procedure. The patient understood and agreed to proceed. The patient was counseled at length about the risks of elmer Covid-19 during their perioperative period and any recovery window from their procedure. The patient was made aware that elmer Covid-19  may worsen their prognosis for recovering from their procedure  and lend to a higher morbidity and/or mortality risk. All material risks, benefits, and reasonable alternatives including postponing the procedure were discussed. The patient does wish to proceed with the procedure at this time. The patient was positioned prone. Sign-in and time-out was performed. Identifying the site, in this case, both sides L3 L4 medial branches and L5 dorsal ramus/rami  to address the L4-5 and L5-S1 joints . The right side as performed first followed by the left side. Sterile technique was done in the usual manner using chlorhexidine. This was followed by infiltration of a 19 ml of 1% preservative-free lidocaine. A 3.5 inch  22-gauge spinal needle(s)  were positioned under fluoroscopic guidance. A total of  6 needles were positioned. Upon confirmation that the needle was properly positioned, a solution of 80 mg of triamcinolone and  8 ml of 1% preservative-free lidocaine was injected without difficulty. The patient tolerated the procedure well and went to the recovery room with stable vital signs.     Estimated Blood Loss (mL):

## 2020-07-30 ENCOUNTER — OFFICE VISIT (OUTPATIENT)
Dept: PHYSICAL MEDICINE AND REHAB | Age: 60
End: 2020-07-30
Payer: MEDICARE

## 2020-07-30 VITALS
BODY MASS INDEX: 25.55 KG/M2 | SYSTOLIC BLOOD PRESSURE: 118 MMHG | TEMPERATURE: 97.3 F | DIASTOLIC BLOOD PRESSURE: 70 MMHG | HEIGHT: 66 IN | WEIGHT: 159 LBS

## 2020-07-30 PROCEDURE — 3017F COLORECTAL CA SCREEN DOC REV: CPT | Performed by: PHYSICAL MEDICINE & REHABILITATION

## 2020-07-30 PROCEDURE — G8427 DOCREV CUR MEDS BY ELIG CLIN: HCPCS | Performed by: PHYSICAL MEDICINE & REHABILITATION

## 2020-07-30 PROCEDURE — G8419 CALC BMI OUT NRM PARAM NOF/U: HCPCS | Performed by: PHYSICAL MEDICINE & REHABILITATION

## 2020-07-30 PROCEDURE — 99213 OFFICE O/P EST LOW 20 MIN: CPT | Performed by: PHYSICAL MEDICINE & REHABILITATION

## 2020-07-30 PROCEDURE — 4004F PT TOBACCO SCREEN RCVD TLK: CPT | Performed by: PHYSICAL MEDICINE & REHABILITATION

## 2020-07-30 NOTE — PROGRESS NOTES
FOLLOW UP APPOINTMENT:         Dipesh Velasquez MD    7/30/2020       Melody Shearon Peabody is a 62 y.o. female, who came for a   post injection follow up      Last visit was on 7/22/2020  - bilateral L3,4 medial branch and L5 dorsal rami injection. Good relief on both occasions. Denies side effects from the injection.      Cause of the symptom(s): degenerative (arthritis)    Onset: chronic     Current pain level: 4 on the scale of 0-10 ( 10 being worst)     Quality of Symptoms: aching and throbbing      Allergies   Allergen Reactions    Nsaids Other (See Comments)     STOPPED BREATHING    Pcn [Penicillins] Other (See Comments)     DIZZINESS    Medrol [Methylprednisolone] Palpitations       Social History     Socioeconomic History    Marital status:      Spouse name: Not on file    Number of children: Not on file    Years of education: Not on file    Highest education level: Not on file   Occupational History    Not on file   Social Needs    Financial resource strain: Not on file    Food insecurity     Worry: Not on file     Inability: Not on file    Transportation needs     Medical: Not on file     Non-medical: Not on file   Tobacco Use    Smoking status: Current Every Day Smoker     Packs/day: 1.00     Types: Cigarettes    Smokeless tobacco: Never Used   Substance and Sexual Activity    Alcohol use: Yes     Comment: 2 BEERS A MONTH     Drug use: Not on file    Sexual activity: Not on file   Lifestyle    Physical activity     Days per week: Not on file     Minutes per session: Not on file    Stress: Not on file   Relationships    Social connections     Talks on phone: Not on file     Gets together: Not on file     Attends [employer] service: Not on file     Active member of club or organization: Not on file     Attends meetings of clubs or organizations: Not on file     Relationship status: Not on file    Intimate partner violence     Fear of current or ex partner: Not on file     Emotionally abused: Not on file     Physically abused: Not on file     Forced sexual activity: Not on file   Other Topics Concern    Not on file   Social History Narrative    Not on file       Past Medical History:   Diagnosis Date    Anxiety     Chronic back pain     [facility]) [facility] [address]     Depression     Disc disorder     Em[facility] lung [facility] [address]     Fibromyalgia     Osteoarthritis     Osteoporosis        Past Surgical History:   Procedure Laterality Date    FACET JOINT INJECTION Bilateral 5/14/2020    bilat. L3, L4 medial branch and L5 dorsal rami injection performed by Andreas Le MD at [facility] 9 Bilateral 6/17/2020    bilateral C5-6-7 medial branch block performed by Andreas Le MD at [address] [place] [address] ARTHROSCOPY Bilateral 2019    PAIN MANAGEMENT PROCEDURE Bilateral 7/22/2020    bilateral L3-4 medial branch L5 dorsal rami performed by Andreas Le MD at [address] [place] [address]       Family History   Problem Relation Age of Onset    Heart Disease Father     Diabetes Father     High Blood Pressure Father     COPD Father     Stroke Sister     Early Death Brother         Prior to Visit Medications    Medication Sig Taking?  Authorizing Provider   aspirin 81 MG tablet Take 81 mg by mouth daily Yes Historical Provider, MD   dilTIAZem (CARDIZEM) 30 MG tablet take 1 tablet by mouth twice a day Yes Historical Provider, MD   lisinopril (PRINIVIL;ZESTRIL) 5 MG tablet  Yes Historical Provider, MD   simvastatin (ZOCOR) 20 MG tablet take 1 tablet by mouth every evening Yes Historical Provider, MD   cyclobenzaprine (FLEXERIL) 10 MG tablet MAY TAKE 1 ONE TABLET EVERY 8 HOURS AS NEEDED FOR MUSCLE PAIN Yes Historical Provider, MD ANSARI VITAMIN D-3 125 MCG (5000 UT) CAPS capsule take 1 capsule by mouth once daily WITH SUPPER Yes Historical Provider, MD   calcium carbonate 1500 (600 Ca) [address]  Dept: [phone]  Dept Fax: [phone]: [phone]    Visit Date: 7/30/2020    Functionality Assessment/Goals Worksheet     On a scale of 0 (Does not Interfere) to 10 (Completely Interferes)     1. Which number describes how during the past week pain has interfered with       the following:  A. General Activity:  5  B. Mood: 5  C. Walking Ability:  5  D. Normal Work (Includes both work outside the home and housework):  4  E. Relations with Other People:   4  F. Sleep:   4  G. Enjoyment of Life:   4    2. Patient Prefers to Take their Pain Medications:     [x]  On a regular basis   []  Only when necessary    []  Does not take pain medications    3. What are the Patient's Goals/Expectations for Visiting Pain Management?      [x]  Learn about my pain    []  Receive Medication   []  Physical Therapy     []  Treat Depression   [x]  Receive Injections    []  Treat Sleep   []  Deal with Anxiety and Stress   []  Treat Opoid Dependence/Addiction   []  Other:

## 2020-09-22 ENCOUNTER — TELEPHONE (OUTPATIENT)
Dept: PHYSICAL MEDICINE AND REHAB | Age: 60
End: 2020-09-22

## 2020-09-22 ENCOUNTER — VIRTUAL VISIT (OUTPATIENT)
Dept: PHYSICAL MEDICINE AND REHAB | Age: 60
End: 2020-09-22
Payer: MEDICARE

## 2020-09-22 PROCEDURE — 3017F COLORECTAL CA SCREEN DOC REV: CPT | Performed by: PHYSICAL MEDICINE & REHABILITATION

## 2020-09-22 PROCEDURE — 4004F PT TOBACCO SCREEN RCVD TLK: CPT | Performed by: PHYSICAL MEDICINE & REHABILITATION

## 2020-09-22 PROCEDURE — G8419 CALC BMI OUT NRM PARAM NOF/U: HCPCS | Performed by: PHYSICAL MEDICINE & REHABILITATION

## 2020-09-22 PROCEDURE — G8428 CUR MEDS NOT DOCUMENT: HCPCS | Performed by: PHYSICAL MEDICINE & REHABILITATION

## 2020-09-22 PROCEDURE — 99213 OFFICE O/P EST LOW 20 MIN: CPT | Performed by: PHYSICAL MEDICINE & REHABILITATION

## 2020-09-22 NOTE — PROGRESS NOTES
FOLLOW UP APPOINTMENT:         Cornelius Meza MD    9/22/2020       Gladys Keyes is a 56 y.o. female, who came for a   post injection follow up     Cause of the symptom(s): degenerative (arthritis)    Onset: chronic        Prior reatment done: injection   X 2 lumbar medial branch block with significant pain relief. Pain is still tolerable. Current pain level: 4 on the scale of 0-10 ( 10 being worst)     Quality of Symptoms: aching and throbbing    Aggravating factors: activity    Relieving factors: rest, lying down and use of medication    Overall, doing well. Denies side effects from NSAIDs.        Allergies   Allergen Reactions    Nsaids Other (See Comments)     STOPPED BREATHING    Pcn [Penicillins] Other (See Comments)     DIZZINESS    Medrol [Methylprednisolone] Palpitations       Social History     Socioeconomic History    Marital status:      Spouse name: Not on file    Number of children: Not on file    Years of education: Not on file    Highest education level: Not on file   Occupational History    Not on file   Social Needs    Financial resource strain: Not on file    Food insecurity     Worry: Not on file     Inability: Not on file    Transportation needs     Medical: Not on file     Non-medical: Not on file   Tobacco Use    Smoking status: Current Every Day Smoker     Packs/day: 1.00     Types: Cigarettes    Smokeless tobacco: Never Used   Substance and Sexual Activity    Alcohol use: Yes     Comment: 2 BEERS A MONTH     Drug use: Not on file    Sexual activity: Not on file   Lifestyle    Physical activity     Days per week: Not on file     Minutes per session: Not on file    Stress: Not on file   Relationships    Social connections     Talks on phone: Not on file     Gets together: Not on file     Attends Episcopalian service: Not on file     Active member of club or organization: Not on file     Attends meetings of clubs or organizations: Not on file Relationship status: Not on file    Intimate partner violence     Fear of current or ex partner: Not on file     Emotionally abused: Not on file     Physically abused: Not on file     Forced sexual activity: Not on file   Other Topics Concern    Not on file   Social History Narrative    Not on file       Past Medical History:   Diagnosis Date    Anxiety     Chronic back pain     [facility]     Depression     Disc disorder     Emphy[facility] lung [facility]     Fibromyalgia     Osteoarthritis     Osteoporosis        Past Surgical History:   Procedure Laterality Date    FACET JOINT INJECTION Bilateral 5/14/2020    bilat. L3, L4 medial branch and L5 dorsal rami injection performed by Chely Santana MD at [facility] 9 Bilateral 6/17/2020    bilateral C5-6-7 medial branch block performed by Chely Santana MD at [address] [place] [address] ARTHROSCOPY Bilateral 2019    PAIN MANAGEMENT PROCEDURE Bilateral 7/22/2020    bilateral L3-4 medial branch L5 dorsal rami performed by Chely Santana MD at [address] [place] [address]       Family History   Problem Relation Age of Onset    Heart Disease Father     Diabetes Father     High Blood Pressure Father     COPD Father     Stroke Sister     Early Death Brother         Prior to Visit Medications    Medication Sig Taking?  Authorizing Provider   aspirin 81 MG tablet Take 81 mg by mouth daily  Historical Provider, MD   dilTIAZem (CARDIZEM) 30 MG tablet take 1 tablet by mouth twice a day  Historical Provider, MD   lisinopril (PRINIVIL;ZESTRIL) 5 MG tablet   Historical Provider, MD   simvastatin (ZOCOR) 20 MG tablet take 1 tablet by mouth every evening  Historical Provider, MD   cyclobenzaprine (FLEXERIL) 10 MG tablet MAY TAKE 1 ONE TABLET EVERY 8 HOURS AS NEEDED FOR MUSCLE PAIN  Historical Provider, MD ANSARI VITAMIN D-3 125 MCG (5000 UT) CAPS capsule take 1 capsule by mouth once daily WITH SUPPER  Historical Provider, MD   calcium carbonate 1500 (600 Ca) MG TABS tablet   Historical Provider, MD   albuterol sulfate  (90 Base) MCG/ACT inhaler inhale 2 puffs by mouth every 4 hours if needed for SPASMODIC COU. ..  (REFER TO PRESCRIPTION NOTES). Historical Provider, MD   ibuprofen (ADVIL;MOTRIN) 800 MG tablet Take 1 tablet by mouth 3 times daily  Historical Provider, MD   amitriptyline (ELAVIL) 50 MG tablet Take 1 tablet by mouth daily  Historical Provider, MD   Ascorbic Acid (VITAMIN C) 500 MG tablet Take by mouth daily  Historical Provider, MD   VITAMIN D, CHOLECALCIFEROL, PO Take 1 tablet by mouth daily  Historical Provider, MD         Physical Exam  Constitutional:       General: She is not in acute distress. Appearance: Normal appearance. HENT:      Head: Atraumatic. Pulmonary:      Effort: Pulmonary effort is normal. No respiratory distress. Neurological:      Mental Status: She is alert and oriented to person, place, and time. Psychiatric:         Behavior: Behavior normal.           Assessment and Plan:      Diagnosis Orders   1. Lumbar spondylosis         Schedule for right L3 L4 medial branch and L5 dorsal rami radiofrequency ablation. She reports good relief from the injection(s) on 2 occasions by at least 80%. Schedule injection in 2 weeks     Schedule ff up post injection virtually 2 weeks post op       Gladys Mccain is a 56 y.o. female being evaluated by a Virtual Visit (video visit) encounter to address concerns as mentioned above. A caregiver was present when appropriate. Due to this being a TeleHealth encounter (During SUTAL-04 public health emergency), evaluation of the following organ systems was limited: Vitals/Constitutional/EENT/Resp/CV/GI//MS/Neuro/Skin/Heme-Lymph-Imm.   Pursuant to the emergency declaration under the [facility], [facility]5 waiver authority and the [employer] and Response Supplemental Appropriations Act, this Virtual Visit was conducted with patient's (and/or legal guardian's) consent, to reduce the patient's risk of exposure to COVID-19 and provide necessary medical care. The patient (and/or legal guardian) has also been advised to contact this office for worsening conditions or problems, and seek emergency medical treatment and/or call 911 if deemed necessary. Patient identification was verified at the start of the visit: Yes    Total time spent for this encounter: Not billed by time    Services were provided through a video synchronous discussion virtually to substitute for in-person clinic visit. Patient and provider were located at their individual homes. --Marco Collado MD on 9/22/2020 at 9:24 AM    An electronic signature was used to authenticate this note.

## 2020-09-28 NOTE — TELEPHONE ENCOUNTER
Pt. Contacted. Procedure and follow up scheduled. Educated on pre-procedure instructions. Declined another copy. Verbalized understanding.

## 2020-10-05 ENCOUNTER — TELEPHONE (OUTPATIENT)
Dept: PHYSICAL MEDICINE AND REHAB | Age: 60
End: 2020-10-05

## 2020-10-07 ENCOUNTER — HOSPITAL ENCOUNTER (OUTPATIENT)
Age: 60
Setting detail: OUTPATIENT SURGERY
Discharge: HOME | End: 2020-10-07
Attending: PHYSICAL MEDICINE & REHABILITATION | Admitting: PHYSICAL MEDICINE & REHABILITATION
Payer: MEDICARE

## 2020-10-07 ENCOUNTER — APPOINTMENT (OUTPATIENT)
Dept: GENERAL RADIOLOGY | Age: 60
End: 2020-10-07
Attending: PHYSICAL MEDICINE & REHABILITATION
Payer: MEDICARE

## 2020-10-07 VITALS
TEMPERATURE: 97 F | DIASTOLIC BLOOD PRESSURE: 83 MMHG | HEART RATE: 88 BPM | OXYGEN SATURATION: 98 % | BODY MASS INDEX: 26.36 KG/M2 | HEIGHT: 66 IN | SYSTOLIC BLOOD PRESSURE: 128 MMHG | WEIGHT: 164 LBS | RESPIRATION RATE: 16 BRPM

## 2020-10-07 PROCEDURE — 7100000010 HC PHASE II RECOVERY - FIRST 15 MIN: Performed by: PHYSICAL MEDICINE & REHABILITATION

## 2020-10-07 PROCEDURE — 3600000056 HC PAIN LEVEL 4 BASE: Performed by: PHYSICAL MEDICINE & REHABILITATION

## 2020-10-07 PROCEDURE — 7100000011 HC PHASE II RECOVERY - ADDTL 15 MIN: Performed by: PHYSICAL MEDICINE & REHABILITATION

## 2020-10-07 PROCEDURE — 99152 MOD SED SAME PHYS/QHP 5/>YRS: CPT | Performed by: PHYSICAL MEDICINE & REHABILITATION

## 2020-10-07 PROCEDURE — 3209999900 FLUORO FOR SURGICAL PROCEDURES

## 2020-10-07 PROCEDURE — 2720000010 HC SURG SUPPLY STERILE: Performed by: PHYSICAL MEDICINE & REHABILITATION

## 2020-10-07 PROCEDURE — 64635 DESTROY LUMB/SAC FACET JNT: CPT | Performed by: PHYSICAL MEDICINE & REHABILITATION

## 2020-10-07 PROCEDURE — 2709999900 HC NON-CHARGEABLE SUPPLY: Performed by: PHYSICAL MEDICINE & REHABILITATION

## 2020-10-07 PROCEDURE — 2500000003 HC RX 250 WO HCPCS: Performed by: PHYSICAL MEDICINE & REHABILITATION

## 2020-10-07 PROCEDURE — 6360000002 HC RX W HCPCS: Performed by: PHYSICAL MEDICINE & REHABILITATION

## 2020-10-07 PROCEDURE — 3600000057 HC PAIN LEVEL 4 ADDL 15 MIN: Performed by: PHYSICAL MEDICINE & REHABILITATION

## 2020-10-07 PROCEDURE — 64636 DESTROY L/S FACET JNT ADDL: CPT | Performed by: PHYSICAL MEDICINE & REHABILITATION

## 2020-10-07 RX ORDER — LIDOCAINE HYDROCHLORIDE 20 MG/ML
INJECTION, SOLUTION EPIDURAL; INFILTRATION; INTRACAUDAL; PERINEURAL PRN
Status: DISCONTINUED | OUTPATIENT
Start: 2020-10-07 | End: 2020-10-07 | Stop reason: ALTCHOICE

## 2020-10-07 RX ORDER — LIDOCAINE HYDROCHLORIDE 10 MG/ML
INJECTION, SOLUTION EPIDURAL; INFILTRATION; INTRACAUDAL; PERINEURAL PRN
Status: DISCONTINUED | OUTPATIENT
Start: 2020-10-07 | End: 2020-10-07 | Stop reason: ALTCHOICE

## 2020-10-07 RX ORDER — FENTANYL CITRATE 50 UG/ML
INJECTION, SOLUTION INTRAMUSCULAR; INTRAVENOUS PRN
Status: DISCONTINUED | OUTPATIENT
Start: 2020-10-07 | End: 2020-10-07 | Stop reason: ALTCHOICE

## 2020-10-07 RX ORDER — TRIAMCINOLONE ACETONIDE 40 MG/ML
INJECTION, SUSPENSION INTRA-ARTICULAR; INTRAMUSCULAR PRN
Status: DISCONTINUED | OUTPATIENT
Start: 2020-10-07 | End: 2020-10-07 | Stop reason: ALTCHOICE

## 2020-10-07 RX ORDER — MIDAZOLAM HYDROCHLORIDE 1 MG/ML
INJECTION INTRAMUSCULAR; INTRAVENOUS PRN
Status: DISCONTINUED | OUTPATIENT
Start: 2020-10-07 | End: 2020-10-07 | Stop reason: ALTCHOICE

## 2020-10-07 ASSESSMENT — PAIN - FUNCTIONAL ASSESSMENT: PAIN_FUNCTIONAL_ASSESSMENT: 0-10

## 2020-10-07 NOTE — H&P
Marylee Junker, MD      Gladys Francisco is a 56 y.o. female, who came in for a procedure,  Right L3-4 medial branch and L5 dorsal rami  Radiofrequency ablation. No change since last visit.      Aching throbbing pain     Worse with activity     Moderate to severe pain       Treatment done: physical therapy, anti-inflammatory medication and muscle relaxant    Allergies   Allergen Reactions    Nsaids Other (See Comments)     STOPPED BREATHING    Pcn [Penicillins] Other (See Comments)     DIZZINESS    Medrol [Methylprednisolone] Palpitations       Social History     Socioeconomic History    Marital status:      Spouse name: Not on file    Number of children: Not on file    Years of education: Not on file    Highest education level: Not on file   Occupational History    Not on file   Social Needs    Financial resource strain: Not on file    Food insecurity     Worry: Not on file     Inability: Not on file    Transportation needs     Medical: Not on file     Non-medical: Not on file   Tobacco Use    Smoking status: Current Every Day Smoker     Packs/day: 1.00     Types: Cigarettes    Smokeless tobacco: Never Used   Substance and Sexual Activity    Alcohol use: Yes     Comment: 2 BEERS A MONTH     Drug use: Not on file    Sexual activity: Not on file   Lifestyle    Physical activity     Days per week: Not on file     Minutes per session: Not on file    Stress: Not on file   Relationships    Social connections     Talks on phone: Not on file     Gets together: Not on file     Attends Zoroastrianism service: Not on file     Active member of club or organization: Not on file     Attends meetings of clubs or organizations: Not on file     Relationship status: Not on file    Intimate partner violence     Fear of current or ex partner: Not on file     Emotionally abused: Not on file     Physically abused: Not on file     Forced sexual activity: Not on file   Other Topics Concern    Not on file   Social History Narrative    Not on file       Past Medical History:   Diagnosis Date    Anxiety     Chronic back pain     [facility]) [facility] [facility]     Depression     Disc disorder     [facility] lung [facility]     Fibromyalgia     Osteoarthritis     Osteoporosis        Past Surgical History:   Procedure Laterality Date    FACET JOINT INJECTION Bilateral 5/14/2020    bilat. L3, L4 medial branch and L5 dorsal rami injection performed by Magdy Gilman MD at [place] [address] Bilateral 6/17/2020    bilateral C5-6-7 medial branch block performed by Magdy Gilman MD at [address] [place] [address] ARTHROSCOPY Bilateral 2019    PAIN MANAGEMENT PROCEDURE Bilateral 7/22/2020    bilateral L3-4 medial branch L5 dorsal rami performed by Magdy Gilman MD at [address]       Prior to Visit Medications    Medication Sig Taking? Authorizing Provider   aspirin 81 MG tablet Take 81 mg by mouth daily Yes Historical Provider, MD   dilTIAZem (CARDIZEM) 30 MG tablet take 1 tablet by mouth twice a day Yes Historical Provider, MD   lisinopril (PRINIVIL;ZESTRIL) 5 MG tablet  Yes Historical Provider, MD   cyclobenzaprine (FLEXERIL) 10 MG tablet MAY TAKE 1 ONE TABLET EVERY 8 HOURS AS NEEDED FOR MUSCLE PAIN Yes Historical Provider, MD   albuterol sulfate  (90 Base) MCG/ACT inhaler inhale 2 puffs by mouth every 4 hours if needed for SPASMODIC COU. ..  (REFER TO PRESCRIPTION NOTES).  Yes Historical Provider, MD   ibuprofen (ADVIL;MOTRIN) 800 MG tablet Take 1 tablet by mouth 3 times daily Yes Historical Provider, MD   amitriptyline (ELAVIL) 50 MG tablet Take 1 tablet by mouth daily Yes Historical Provider, MD   VITAMIN D, CHOLECALCIFEROL, PO Take 1 tablet by mouth daily Yes Historical Provider, MD   simvastatin (ZOCOR) 20 MG tablet take 1 tablet by mouth every evening  Historical Provider, MD ANSARI VITAMIN D-3 125 MCG (5000 UT) CAPS capsule take 1 capsule by mouth once daily WITH SUPPER  Historical Provider, MD   calcium carbonate 1500 (600 Ca) MG TABS tablet   Historical Provider, MD   Ascorbic Acid (VITAMIN C) 500 MG tablet Take by mouth daily  Historical Provider, MD         Review of Systems : All systems reviewed, all unremarkable other than HPI. No change since last visit. Physical Exam  Constitutional:       General: She is not in acute distress. Appearance: Normal appearance. HENT:      Head: Atraumatic. Cardiovascular:      Rate and Rhythm: Normal rate. Pulmonary:      Effort: Pulmonary effort is normal. No respiratory distress. Skin:     General: Skin is dry. Neurological:      Mental Status: She is alert and oriented to person, place, and time. Psychiatric:         Behavior: Behavior normal.     :    Cervical Spine Exam: Full ROM, without limitation     Access: Adequate mouth opening       Assessment:   Lumbar spondylosis     PLAN:     As advertised. Planned duration of treatment: 4 weeks. Further assessment and followup in  4 weeks for follow up post injection.        Electronically signed by Pedro Lopez MD on 10/7/2020 at 11:28 AM

## 2020-10-07 NOTE — PROGRESS NOTES
1130 patient to phase 2 via cart. Surgery RN at bedside with report. Awake and vitals stable on room air. IV capped. Injection sites clean and dry. 1135 gave patient snack and drink.   1140 IV removed no complications  7448 patient getting dressed  1150 escorted to discharge

## 2020-10-07 NOTE — OP NOTE
Operative Note      Patient: Beverley Don  YOB: 1960  MRN: [id number]    Date of Procedure: 10/7/2020    Pre-Op Diagnosis: Lumbar spondylosis    Post-Op Diagnosis: Same       Procedure(s):  RFA, L3-4 medial branch and L5 dorsal rami, right. Surgeon(s):  Jade Sandoval MD    Assistant:   * No surgical staff found *    Anesthesia: IV Sedation    CONSENT:     The risks, benefits, alternatives, plan, complications, and personnel were discussed prior to the procedure. The patient understood and agreed to proceed. The patient was counseled at length about the risks of elmer Covid-19 during their perioperative period and any recovery window from their procedure. The patient was made aware that elmer Covid-19  may worsen their prognosis for recovering from their procedure  and lend to a higher morbidity and/or mortality risk. All material risks, benefits, and reasonable alternatives including postponing the procedure were discussed. The patient does wish to proceed with the procedure at this time. PROCEDURE:     Anesthesia: Moderate sedation using 2 mg IV versed & 100 mcg IV fentanyl. The patient was positioned prone. Sign-in and time-out was performed. Identifying the site, in this case, right side     L3 L4 medial branches and L5 dorsal ramus/rami  to address the L4-5 and L5-S1 joints     Sterile technique was done in the usual manner using chlorhexidine. Thiis was followed by infiltration of a 3 cc of 1% preservative-free lidocaine. A  100 mm-20 gauge radiofrequency needles were positioned under fluoroscopic   guidance. A total of 3 radiofrequency needles were positioned. Upon confirmation that the needle was properly positioned, a series of a stimulation was performed as follows: In both motor and sensory stimulation, there was no evidence of radicular pain pattern. Prior to lesioning, 1 mL of 2% lidocaine was injected to the sites identified. Total of 3 ml was injected. 90° for 90 seconds continuous thermal ablation was performed. This was followed by injecting solution of 40 mg of triamcinolone and  2 ml of 1% preservative-free lidocaine was injected without difficulty. The patient tolerated the procedure well and went to the recovery room with stable vital signs. Estimated Blood Loss (mL): Minimal    Complications: None    Moderate Sedation Time: > 15 minutes      PLAN:   Home instructions were provided. The patient will follow up in 4 to 6 weeks or earlier if needed.        Electronically signed by Marylee Junker, MD on 10/7/2020 at 11:29 AM

## 2020-10-20 ENCOUNTER — TELEPHONE (OUTPATIENT)
Dept: PHYSICAL MEDICINE AND REHAB | Age: 60
End: 2020-10-20

## 2020-10-20 ENCOUNTER — VIRTUAL VISIT (OUTPATIENT)
Dept: PHYSICAL MEDICINE AND REHAB | Age: 60
End: 2020-10-20
Payer: MEDICARE

## 2020-10-20 PROCEDURE — 3017F COLORECTAL CA SCREEN DOC REV: CPT | Performed by: PHYSICAL MEDICINE & REHABILITATION

## 2020-10-20 PROCEDURE — 4004F PT TOBACCO SCREEN RCVD TLK: CPT | Performed by: PHYSICAL MEDICINE & REHABILITATION

## 2020-10-20 PROCEDURE — G8419 CALC BMI OUT NRM PARAM NOF/U: HCPCS | Performed by: PHYSICAL MEDICINE & REHABILITATION

## 2020-10-20 PROCEDURE — G8428 CUR MEDS NOT DOCUMENT: HCPCS | Performed by: PHYSICAL MEDICINE & REHABILITATION

## 2020-10-20 PROCEDURE — 99213 OFFICE O/P EST LOW 20 MIN: CPT | Performed by: PHYSICAL MEDICINE & REHABILITATION

## 2020-10-20 PROCEDURE — G8484 FLU IMMUNIZE NO ADMIN: HCPCS | Performed by: PHYSICAL MEDICINE & REHABILITATION

## 2020-10-20 NOTE — TELEPHONE ENCOUNTER
Orders for IL RADIOFREQUENCY NEUROTOMY LUMBAR OR SACRAL, W IMAGE GUIDANCE, SINGLE and [place] RADIOFREQ NEUROTOMY LUMBAR OR SACRAL, W IMAGE GUIDE,EA ADDL LEVEL taken to scheduling.

## 2020-10-20 NOTE — PROGRESS NOTES
FOLLOW UP APPOINTMENT: VIRTUAL VISIT         Sil Grijalva MD    10/20/2020       Gladys Thurman is a 56 y.o. female, who came for a   post injection follow up    Cause of the symptom(s): degenerative (arthritis)    Onset: chronic      Prior reatment done: right RFA L3-4 medial branch and L5 dorsal rami    Current pain level: 2 on the scale of 0-10 ( 10 being worst)     Quality of Symptoms: aching and throbbing    Aggravating factors: activity    Relieving factors: rest and lying down      Allergies   Allergen Reactions    Nsaids Other (See Comments)     STOPPED BREATHING    Pcn [Penicillins] Other (See Comments)     DIZZINESS    Medrol [Methylprednisolone] Palpitations       Social History     Socioeconomic History    Marital status:      Spouse name: Not on file    Number of children: Not on file    Years of education: Not on file    Highest education level: Not on file   Occupational History    Not on file   Social Needs    Financial resource strain: Not on file    Food insecurity     Worry: Not on file     Inability: Not on file    Transportation needs     Medical: Not on file     Non-medical: Not on file   Tobacco Use    Smoking status: Current Every Day Smoker     Packs/day: 1.00     Types: Cigarettes    Smokeless tobacco: Never Used   Substance and Sexual Activity    Alcohol use: Yes     Comment: 2 BEERS A MONTH     Drug use: Not on file    Sexual activity: Not on file   Lifestyle    Physical activity     Days per week: Not on file     Minutes per session: Not on file    Stress: Not on file   Relationships    Social connections     Talks on phone: Not on file     Gets together: Not on file     Attends Sabianism service: Not on file     Active member of club or organization: Not on file     Attends meetings of clubs or organizations: Not on file     Relationship status: Not on file    Intimate partner violence     Fear of current or ex partner: Not on file     Emotionally abused: Not on file     Physically abused: Not on file     Forced sexual activity: Not on file   Other Topics Concern    Not on file   Social History Narrative    Not on file       Past Medical History:   Diagnosis Date    Anxiety     Chronic back pain     [facility] disease) [facility] [facility]     Depression     Disc disorder     Emphy[facility] lung ([facility]     Fibromyalgia     Osteoarthritis     Osteoporosis        Past Surgical History:   Procedure Laterality Date    FACET JOINT INJECTION Bilateral 5/14/2020    bilat. L3, L4 medial branch and L5 dorsal rami injection performed by [facility] MD at [facility] 9 Bilateral 6/17/2020    bilateral C5-6-7 medial branch block performed by Ade Velasquez MD at [address] [place] [address] ARTHROSCOPY Bilateral 2019    PAIN MANAGEMENT PROCEDURE Bilateral 7/22/2020    bilateral L3-4 medial branch L5 dorsal rami performed by [facility] MD at [facility] Right 10/7/2020    RFA, L3-4 medial branch and L5 dorsal rami, right. performed by Ade Velasquez MD at [address] [place] [address]       Family History   Problem Relation Age of Onset    Heart Disease Father     Diabetes Father     High Blood Pressure Father     COPD Father     Stroke Sister     Early Death Brother         Prior to Visit Medications    Medication Sig Taking?  Authorizing Provider   aspirin 81 MG tablet Take 81 mg by mouth daily  Historical Provider, MD   dilTIAZem (CARDIZEM) 30 MG tablet take 1 tablet by mouth twice a day  Historical Provider, MD   lisinopril (PRINIVIL;ZESTRIL) 5 MG tablet   Historical Provider, MD   simvastatin (ZOCOR) 20 MG tablet take 1 tablet by mouth every evening  Historical Provider, MD   cyclobenzaprine (FLEXERIL) 10 MG tablet MAY TAKE 1 ONE TABLET EVERY 8 HOURS AS NEEDED FOR MUSCLE PAIN  Historical Provider, MD ANSARI VITAMIN D-3 125 M[employer] Emergencies Act, 1135 waiver authority and the Coronavirus Preparedness and Response Supplemental Appropriations Act, this Virtual Visit was conducted with patient's (and/or legal guardian's) consent, to reduce the patient's risk of exposure to COVID-19 and provide necessary medical care. The patient (and/or legal guardian) has also been advised to contact this office for worsening conditions or problems, and seek emergency medical treatment and/or call 911 if deemed necessary. Patient identification was verified at the start of the visit: Yes    Total time spent for this encounter: 25 min    Services were provided through a video synchronous discussion virtually to substitute for in-person clinic visit. Patient and provider were located at their individual homes. --Pedro Lopez MD on 10/20/2020 at 11:33 AM    An electronic signature was used to authenticate this note.

## 2020-10-21 NOTE — TELEPHONE ENCOUNTER
Pt. Contacted. Procedure and follow up scheduled. Educated on pre-procedure Instructions, also mailed. Verbalized understanding.

## 2020-11-03 NOTE — PROGRESS NOTES
Patient contacted regarding COVID-19 screen. Following questions asked: In the last month, have you been in contact with someone who was confirmed or suspected to have Coronavirus/COVID-19:  Patient stated NO    Do you or family members have any of the following symptoms:  Cough-no   Muscle pain-no   Shortness of breath-no   Fever-no   Weakness-no  Severe headache-no   Sore throat-no   Respiratory symptoms-no  Loss of taste and smell-no    Have you traveled in the last month?  No

## 2020-11-04 ENCOUNTER — HOSPITAL ENCOUNTER (OUTPATIENT)
Age: 60
Setting detail: OUTPATIENT SURGERY
Discharge: HOME | End: 2020-11-04
Attending: PHYSICAL MEDICINE & REHABILITATION | Admitting: PHYSICAL MEDICINE & REHABILITATION
Payer: MEDICARE

## 2020-11-04 ENCOUNTER — APPOINTMENT (OUTPATIENT)
Dept: GENERAL RADIOLOGY | Age: 60
End: 2020-11-04
Attending: PHYSICAL MEDICINE & REHABILITATION
Payer: MEDICARE

## 2020-11-04 VITALS
HEART RATE: 73 BPM | RESPIRATION RATE: 10 BRPM | WEIGHT: 164.6 LBS | BODY MASS INDEX: 26.45 KG/M2 | TEMPERATURE: 97.1 F | HEIGHT: 66 IN | DIASTOLIC BLOOD PRESSURE: 74 MMHG | SYSTOLIC BLOOD PRESSURE: 138 MMHG | OXYGEN SATURATION: 98 %

## 2020-11-04 PROCEDURE — 3600000052 HC PAIN LEVEL 2 BASE: Performed by: PHYSICAL MEDICINE & REHABILITATION

## 2020-11-04 PROCEDURE — 2500000003 HC RX 250 WO HCPCS: Performed by: PHYSICAL MEDICINE & REHABILITATION

## 2020-11-04 PROCEDURE — 7100000011 HC PHASE II RECOVERY - ADDTL 15 MIN: Performed by: PHYSICAL MEDICINE & REHABILITATION

## 2020-11-04 PROCEDURE — 7100000010 HC PHASE II RECOVERY - FIRST 15 MIN: Performed by: PHYSICAL MEDICINE & REHABILITATION

## 2020-11-04 PROCEDURE — 99153 MOD SED SAME PHYS/QHP EA: CPT | Performed by: PHYSICAL MEDICINE & REHABILITATION

## 2020-11-04 PROCEDURE — 2720000010 HC SURG SUPPLY STERILE: Performed by: PHYSICAL MEDICINE & REHABILITATION

## 2020-11-04 PROCEDURE — 64635 DESTROY LUMB/SAC FACET JNT: CPT | Performed by: PHYSICAL MEDICINE & REHABILITATION

## 2020-11-04 PROCEDURE — 64636 DESTROY L/S FACET JNT ADDL: CPT | Performed by: PHYSICAL MEDICINE & REHABILITATION

## 2020-11-04 PROCEDURE — 2709999900 HC NON-CHARGEABLE SUPPLY: Performed by: PHYSICAL MEDICINE & REHABILITATION

## 2020-11-04 PROCEDURE — 99152 MOD SED SAME PHYS/QHP 5/>YRS: CPT | Performed by: PHYSICAL MEDICINE & REHABILITATION

## 2020-11-04 PROCEDURE — 3600000053 HC PAIN LEVEL 2 ADDL 15 MIN: Performed by: PHYSICAL MEDICINE & REHABILITATION

## 2020-11-04 PROCEDURE — 6360000002 HC RX W HCPCS: Performed by: PHYSICAL MEDICINE & REHABILITATION

## 2020-11-04 PROCEDURE — 3209999900 FLUORO FOR SURGICAL PROCEDURES

## 2020-11-04 RX ORDER — FENTANYL CITRATE 50 UG/ML
INJECTION, SOLUTION INTRAMUSCULAR; INTRAVENOUS PRN
Status: DISCONTINUED | OUTPATIENT
Start: 2020-11-04 | End: 2020-11-04 | Stop reason: ALTCHOICE

## 2020-11-04 RX ORDER — MIDAZOLAM HYDROCHLORIDE 1 MG/ML
INJECTION INTRAMUSCULAR; INTRAVENOUS PRN
Status: DISCONTINUED | OUTPATIENT
Start: 2020-11-04 | End: 2020-11-04 | Stop reason: ALTCHOICE

## 2020-11-04 RX ORDER — LIDOCAINE HYDROCHLORIDE 20 MG/ML
INJECTION, SOLUTION EPIDURAL; INFILTRATION; INTRACAUDAL; PERINEURAL PRN
Status: DISCONTINUED | OUTPATIENT
Start: 2020-11-04 | End: 2020-11-04 | Stop reason: ALTCHOICE

## 2020-11-04 RX ORDER — TRIAMCINOLONE ACETONIDE 40 MG/ML
INJECTION, SUSPENSION INTRA-ARTICULAR; INTRAMUSCULAR PRN
Status: DISCONTINUED | OUTPATIENT
Start: 2020-11-04 | End: 2020-11-04 | Stop reason: ALTCHOICE

## 2020-11-04 RX ORDER — LIDOCAINE HYDROCHLORIDE 10 MG/ML
INJECTION, SOLUTION EPIDURAL; INFILTRATION; INTRACAUDAL; PERINEURAL PRN
Status: DISCONTINUED | OUTPATIENT
Start: 2020-11-04 | End: 2020-11-04 | Stop reason: ALTCHOICE

## 2020-11-04 ASSESSMENT — PAIN - FUNCTIONAL ASSESSMENT: PAIN_FUNCTIONAL_ASSESSMENT: 0-10

## 2020-11-04 NOTE — OP NOTE
Operative Note    Patient: Makayla Arreola  YOB: 1960  MRN: [id number]    Date of Procedure: 11/4/2020    Pre-Op Diagnosis: Lumbar spondylosis    Post-Op Diagnosis: Same       Procedure(s):  RFA, L3-4 medial branch and L5 dorsal rami, left. Surgeon(s):  Michel Hoskins MD    Assistant:   * No surgical staff found *    Anesthesia: IV Sedation    Preoperative Diagnosis: Lumbar spondylosis/Facet Arthropathy    Post Operative Diagnosis: Same     CONSENT:     The risks, benefits, alternatives, plan, complications, and personnel were discussed prior to the procedure. The patient understood and agreed to proceed. The patient was counseled at length about the risks of elmer Covid-19 during their perioperative period and any recovery window from their procedure. The patient was made aware that elmer Covid-19  may worsen their prognosis for recovering from their procedure  and lend to a higher morbidity and/or mortality risk. All material risks, benefits, and reasonable alternatives including postponing the procedure were discussed. The patient does wish to proceed with the procedure at this time. PROCEDURE:     Anesthesia: Moderate sedation using 4 mg IV versed & 100 mcg IV fentanyl. The patient was positioned prone. Sign-in and time-out was performed. Identifying the site, in this case, left side     L3 L4 medial branches and L5 dorsal ramus/rami  to address the L4-5 and L5-S1 joints     Sterile technique was done in the usual manner using chlorhexidine. Thiis was followed by infiltration of a 3 cc of 1% preservative-free lidocaine. A 20 g, 100 mm radiofrequency needles were positioned under fluoroscopic   guidance. A total of 3 radiofrequency needles were positioned. Upon confirmation that the needle was properly positioned, a series of a stimulation was performed as follows:     In both motor and sensory stimulation, there was no evidence of radicular pain pattern. Prior to lesioning, 1 mL of 2% lidocaine was injected to the sites identified. Total of 3 ml was injected. 90° for 90 seconds continuous thermal ablation was performed. This was followed by injecting solution of 40 mg of triamcinolone and  2 ml of 1% preservative-free lidocaine was injected without difficulty. The patient tolerated the procedure well and went to the recovery room with stable vital signs. Estimated Blood Loss (mL): Minimal    Complications: None    Moderate Sedation Time: > 15 minutes    PLAN:     Home instructions were provided. The patient will follow up in 4 to 6 weeks or earlier if needed.        Electronically signed by Jj Gamble MD on 11/4/2020 at 12:34 PM

## 2020-11-04 NOTE — PROGRESS NOTES
1236: Patient arrived to Phase II recovery via cart. Awake and alert. Report received from surgical RN. 1238: VSS, patient denies pain at this time. Pedal push and pull strong and equal bilaterally. HOB elevated and injections site remains clean and dry and open to air. Snack and drink provided and call light placed within reach. 1252: Patient dressed at bedside independently. 1257: IV removed without complications. Bandaid applied. 1309: Patient discharged home in stable condition with son.

## 2020-11-04 NOTE — H&P
David Isbell MD      Gladys Lynch is a 56 y.o. female, who came in for a procedure,  Left L3-4 medial branch and L5 dorsal rami radiofrequency ablation. No change since last visit. Aching, throbbing pain. Worse with activity.      Treatment done: physical therapy, anti-inflammatory medication and muscle relaxant    Allergies   Allergen Reactions    Nsaids Other (See Comments)     STOPPED BREATHING    Pcn [Penicillins] Other (See Comments)     DIZZINESS    Medrol [Methylprednisolone] Palpitations       Social History     Socioeconomic History    Marital status:      Spouse name: Not on file    Number of children: Not on file    Years of education: Not on file    Highest education level: Not on file   Occupational History    Not on file   Social Needs    Financial resource strain: Not on file    Food insecurity     Worry: Not on file     Inability: Not on file    Transportation needs     Medical: Not on file     Non-medical: Not on file   Tobacco Use    Smoking status: Current Every Day Smoker     Packs/day: 1.00     Types: Cigarettes    Smokeless tobacco: Never Used   Substance and Sexual Activity    Alcohol use: Yes     Comment: 2 BEERS A MONTH     Drug use: Not on file    Sexual activity: Not on file   Lifestyle    Physical activity     Days per week: Not on file     Minutes per session: Not on file    Stress: Not on file   Relationships    Social connections     Talks on phone: Not on file     Gets together: Not on file     Attends Muslim service: Not on file     Active member of club or organization: Not on file     Attends meetings of clubs or organizations: Not on file     Relationship status: Not on file    Intimate partner violence     Fear of current or ex partner: Not on file     Emotionally abused: Not on file     Physically abused: Not on file     Forced sexual activity: Not on file   Other Topics Concern    Not on file   Social History Narrative  Not on file       Past Medical History:   Diagnosis Date    Anxiety     Chronic back pain     [facility]     Depression     Disc disorder     [facility] lung ([facility])     Fibromyalgia     Hyperlipidemia     Hypertension     Osteoarthritis     Osteoporosis        Past Surgical History:   Procedure Laterality Date    FACET JOINT INJECTION Bilateral 5/14/2020    bilat. L3, L4 medial branch and L5 dorsal rami injection performed by Luz Marina Reilly MD at [facility] Bilateral 6/17/2020    bilateral C5-6-7 medial branch block performed by Luz Marina Reilly MD at [facility] ARTHROSCOPY Bilateral 2019    PAIN MANAGEMENT PROCEDURE Bilateral 7/22/2020    bilateral L3-4 medial branch L5 dorsal rami performed by Luz Marina Reilly MD at [facility] Right 10/7/2020    RFA, L3-4 medial branch and L5 dorsal rami, right. performed by Luz [address]       Prior to Visit Medications    Medication Sig Taking? Authorizing Provider   dilTIAZem (CARDIZEM) 30 MG tablet take 1 tablet by mouth twice a day Yes Historical Provider, MD   lisinopril (PRINIVIL;ZESTRIL) 5 MG tablet  Yes Historical Provider, MD   simvastatin (ZOCOR) 20 MG tablet take 1 tablet by mouth every evening Yes Historical Provider, MD   cyclobenzaprine (FLEXERIL) 10 MG tablet MAY TAKE 1 ONE TABLET EVERY 8 HOURS AS NEEDED FOR MUSCLE PAIN Yes Historical Provider, MD   calcium carbonate 1500 (600 Ca) MG TABS tablet  Yes Historical Provider, MD   albuterol sulfate  (90 Base) MCG/ACT inhaler inhale 2 puffs by mouth every 4 hours if needed for SPASMODIC COU. ..  (REFER TO PRESCRIPTION NOTES).  Yes Historical Provider, MD   amitriptyline (ELAVIL) 50 MG tablet Take 1 tablet by mouth daily Yes Historical Provider, MD   aspirin 81 MG tablet Take 81 mg by mouth daily Historical Provider, MD ANSARI VITAMIN D-3 125 MCG (5000 UT) CAPS capsule take 1 capsule by mouth once daily WITH SUPPER  Historical Provider, MD   ibuprofen (ADVIL;MOTRIN) 800 MG tablet Take 1 tablet by mouth 3 times daily  Historical Provider, MD   Ascorbic Acid (VITAMIN C) 500 MG tablet Take by mouth daily  Historical Provider, MD   VITAMIN D, CHOLECALCIFEROL, PO Take 1 tablet by mouth daily  Historical Provider, MD         Review of Systems : All systems reviewed, all unremarkable other than HPI. No change since last visit. Physical Exam  HENT:      Head: Atraumatic. Pulmonary:      Effort: Pulmonary effort is normal. No respiratory distress. Breath sounds: Normal breath sounds. Neurological:      Mental Status: She is oriented to person, place, and time. Psychiatric:         Behavior: Behavior normal.       Cervical Spine Exam: Full ROM, without limitation     Access: Adequate mouth opening     Assessment:   Lumbar spondylosis     PLAN:     As advertised. Planned duration of treatment: 4 weeks. Further assessment and followup in  4 weeks for follow up post injection.        Electronically signed by Sena Mccormick MD on 11/4/2020 at 11:31 AM

## 2021-01-13 ENCOUNTER — OFFICE VISIT (OUTPATIENT)
Dept: PHYSICAL MEDICINE AND REHAB | Age: 61
End: 2021-01-13
Payer: MEDICARE

## 2021-01-13 VITALS
BODY MASS INDEX: 26.36 KG/M2 | SYSTOLIC BLOOD PRESSURE: 138 MMHG | WEIGHT: 164 LBS | HEIGHT: 66 IN | DIASTOLIC BLOOD PRESSURE: 84 MMHG | TEMPERATURE: 97.3 F

## 2021-01-13 DIAGNOSIS — M47.812 SPONDYLOSIS OF CERVICAL REGION WITHOUT MYELOPATHY OR RADICULOPATHY: Primary | ICD-10-CM

## 2021-01-13 PROCEDURE — 4004F PT TOBACCO SCREEN RCVD TLK: CPT | Performed by: PHYSICAL MEDICINE & REHABILITATION

## 2021-01-13 PROCEDURE — 99214 OFFICE O/P EST MOD 30 MIN: CPT | Performed by: PHYSICAL MEDICINE & REHABILITATION

## 2021-01-13 PROCEDURE — 3017F COLORECTAL CA SCREEN DOC REV: CPT | Performed by: PHYSICAL MEDICINE & REHABILITATION

## 2021-01-13 PROCEDURE — G8427 DOCREV CUR MEDS BY ELIG CLIN: HCPCS | Performed by: PHYSICAL MEDICINE & REHABILITATION

## 2021-01-13 PROCEDURE — G8419 CALC BMI OUT NRM PARAM NOF/U: HCPCS | Performed by: PHYSICAL MEDICINE & REHABILITATION

## 2021-01-13 PROCEDURE — G8484 FLU IMMUNIZE NO ADMIN: HCPCS | Performed by: PHYSICAL MEDICINE & REHABILITATION

## 2021-01-13 NOTE — PROGRESS NOTES
FOLLOW UP APPOINTMENT:         Sanna Chakraborty MD    1/13/2021       Gladys Diaz is a 56 y.o. female, who came for a  follow up to discuss treatment options    Cause of the symptom(s): degenerative (arthritis)    Onset:  Chronic     History of lumbar RFA, bilateral L3-4 medial branch and L5 dorsal rami. At least 70% relief. Can perform ADLS with ease. Prior reatment done: injection, anti-inflammatory medication and muscle relaxant    Overall, doing well. Denies side effects from the injection. Of note, she had a bilateral C5-6-7 medial branch block which provided significant relief . Last injection was 7/20/20.        Allergies   Allergen Reactions    Nsaids Other (See Comments)     STOPPED BREATHING    Pcn [Penicillins] Other (See Comments)     DIZZINESS    Medrol [Methylprednisolone] Palpitations       Social History     Socioeconomic History    Marital status:      Spouse name: Not on file    Number of children: Not on file    Years of education: Not on file    Highest education level: Not on file   Occupational History    Not on file   Social Needs    Financial resource strain: Not on file    Food insecurity     Worry: Not on file     Inability: Not on file    Transportation needs     Medical: Not on file     Non-medical: Not on file   Tobacco Use    Smoking status: Current Every Day Smoker     Packs/day: 1.00     Types: Cigarettes    Smokeless tobacco: Never Used   Substance and Sexual Activity    Alcohol use: Yes     Comment: 2 BEERS A MONTH     Drug use: Not on file    Sexual activity: Not on file   Lifestyle    Physical activity     Days per week: Not on file     Minutes per session: Not on file    Stress: Not on file   Relationships    Social connections     Talks on phone: Not on file     Gets together: Not on file     Attends Mosque service: Not on file     Active member of club or organization: Not on file Attends meetings of clubs or organizations: Not on file     Relationship status: Not on file    Intimate partner violence     Fear of current or ex partner: Not on file     Emotionally abused: Not on file     Physically abused: Not on file     Forced sexual activity: Not on file   Other Topics Concern    Not on file   Social History Narrative    Not on file       Past Medical History:   Diagnosis Date    Anxiety     Chronic back pain     [facility] [address]     Depression     Disc disorder     [facility] lung [facility] [address] [facility]     Fibromyalgia     Hyperlipidemia     Hypertension     Osteoarthritis     Osteoporosis        Past Surgical History:   Procedure Laterality Date    FACET JOINT INJECTION Bilateral 5/14/2020    bilat. L3, L4 medial branch and L5 dorsal rami injection performed by Kendra Hanson MD at [place] [address] Bilateral 6/17/2020    bilateral C5-6-7 medial branch block performed by Kendra Hanson MD at [address] [place] [address] ARTHROSCOPY Bilateral 2019    PAIN MANAGEMENT PROCEDURE Bilateral 7/22/2020    bilateral L3-4 medial branch L5 dorsal rami performed by Kendra Hanson MD at [place] [address] Right 10/7/2020    RFA, L3-4 medial branch and L5 dorsal rami, right. performed by Kendra Hanson MD at [place] [address] Left 11/4/2020    RFA, L3-4 medial branch and L5 dorsal rami, left. performed by Kendra Hanson MD at [address] [place], [place] [address]Th        Family History   Problem Relation Age of Onset    Heart Disease Father     Diabetes Father     High Blood Pressure Father     COPD Father     Stroke Sister     Early Death Brother         Prior to Visit Medications    Medication Sig Taking?  Authorizing Provider   aspirin 81 MG tablet Take 81 mg by mouth daily Yes Historical Provider, MD dilTIAZem (CARDIZEM) 30 MG tablet take 1 tablet by mouth twice a day Yes Historical Provider, MD   lisinopril (PRINIVIL;ZESTRIL) 5 MG tablet  Yes Historical Provider, MD   simvastatin (ZOCOR) 20 MG tablet take 1 tablet by mouth every evening Yes Historical Provider, MD   cyclobenzaprine (FLEXERIL) 10 MG tablet MAY TAKE 1 ONE TABLET EVERY 8 HOURS AS NEEDED FOR MUSCLE PAIN Yes Historical Provider, MD ANSARI VITAMIN D-3 125 MCG (5000 UT) CAPS capsule take 1 capsule by mouth once daily WITH SUPPER Yes Historical Provider, MD   calcium carbonate 1500 (600 Ca) MG TABS tablet  Yes Historical Provider, MD   albuterol sulfate  (90 Base) MCG/ACT inhaler inhale 2 puffs by mouth every 4 hours if needed for SPASMODIC COU. ..  (REFER TO PRESCRIPTION NOTES). Yes Historical Provider, MD   ibuprofen (ADVIL;MOTRIN) 800 MG tablet Take 1 tablet by mouth 3 times daily Yes Historical Provider, MD   amitriptyline (ELAVIL) 50 MG tablet Take 1 tablet by mouth daily Yes Historical Provider, MD   Ascorbic Acid (VITAMIN C) 500 MG tablet Take by mouth daily Yes Historical Provider, MD   VITAMIN D, CHOLECALCIFEROL, PO Take 1 tablet by mouth daily Yes Historical Provider, MD         Review of Systems : All systems reviewed, all unremarkable other than HPI/subjective. No change since last visit. Denies  fever, chills, infection or non healing wound. /84   Temp 97.3 °F (36.3 °C)   Ht 5' 6\" (1.676 m)   Wt 164 lb (74.4 kg)   BMI 26.47 kg/m²       Physical Exam  Constitutional:       General: She is not in acute distress. Appearance: She is normal weight. HENT:      Head: Normocephalic and atraumatic. Neck:      Musculoskeletal: Normal range of motion and neck supple. Cardiovascular:      Rate and Rhythm: Normal rate. Pulmonary:      Effort: Pulmonary effort is normal. No respiratory distress. Abdominal:      Palpations: Abdomen is soft. Musculoskeletal:         General: Tenderness present. Comments: Cervical facet, bilateral    Skin:     General: Skin is dry. Findings: Bruising present. Comments: Right forearm    Neurological:      Mental Status: She is alert and oriented to person, place, and time. Motor: No weakness. Gait: Gait normal.   Psychiatric:         Mood and Affect: Mood normal.         Behavior: Behavior normal.         Assessment and Plan:      Diagnosis Orders   1. Spondylosis of cervical region without myelopathy or radiculopathy  KY INJ DX/THER AGNT PARAVERT FACET JOINT, CERV/THORAC, 1ST LEVEL    [place] INJ DX/THER AGNT PARAVERT FACET JOINT, CERV/THORAC, 2ND LEVEL     Schedule for bilateral C5-6-7 medial branch block. Schedule injection in 2 weeks     Schedule ff up post injection virtually 2 weeks post op     All questions were answered and imaging studies reviewed with the patient. I have reviewed the chief complaint and HPI including the [facility] and Vital documentation by my staff and I agree with their documentation and have added where applicable. Time spent with patient was 25 minutes. More than 50% was spent counseling/coordinating the patient's care.        Artur Quick MD   Spine Medicine/PM&R

## 2021-01-19 ENCOUNTER — TELEPHONE (OUTPATIENT)
Dept: PHYSICAL MEDICINE AND REHAB | Age: 61
End: 2021-01-19

## 2021-01-20 ENCOUNTER — APPOINTMENT (OUTPATIENT)
Dept: GENERAL RADIOLOGY | Age: 61
End: 2021-01-20
Attending: PHYSICAL MEDICINE & REHABILITATION
Payer: MEDICARE

## 2021-01-20 ENCOUNTER — HOSPITAL ENCOUNTER (OUTPATIENT)
Age: 61
Setting detail: OUTPATIENT SURGERY
Discharge: HOME | End: 2021-01-20
Attending: PHYSICAL MEDICINE & REHABILITATION | Admitting: PHYSICAL MEDICINE & REHABILITATION
Payer: MEDICARE

## 2021-01-20 VITALS
TEMPERATURE: 97.5 F | SYSTOLIC BLOOD PRESSURE: 127 MMHG | RESPIRATION RATE: 16 BRPM | BODY MASS INDEX: 26.36 KG/M2 | OXYGEN SATURATION: 94 % | WEIGHT: 164 LBS | DIASTOLIC BLOOD PRESSURE: 87 MMHG | HEART RATE: 88 BPM | HEIGHT: 66 IN

## 2021-01-20 PROCEDURE — 2709999900 HC NON-CHARGEABLE SUPPLY: Performed by: PHYSICAL MEDICINE & REHABILITATION

## 2021-01-20 PROCEDURE — 3600000056 HC PAIN LEVEL 4 BASE: Performed by: PHYSICAL MEDICINE & REHABILITATION

## 2021-01-20 PROCEDURE — 64490 INJ PARAVERT F JNT C/T 1 LEV: CPT | Performed by: PHYSICAL MEDICINE & REHABILITATION

## 2021-01-20 PROCEDURE — 7100000011 HC PHASE II RECOVERY - ADDTL 15 MIN: Performed by: PHYSICAL MEDICINE & REHABILITATION

## 2021-01-20 PROCEDURE — 64491 INJ PARAVERT F JNT C/T 2 LEV: CPT | Performed by: PHYSICAL MEDICINE & REHABILITATION

## 2021-01-20 PROCEDURE — 6360000002 HC RX W HCPCS: Performed by: PHYSICAL MEDICINE & REHABILITATION

## 2021-01-20 PROCEDURE — 2500000003 HC RX 250 WO HCPCS: Performed by: PHYSICAL MEDICINE & REHABILITATION

## 2021-01-20 PROCEDURE — 99152 MOD SED SAME PHYS/QHP 5/>YRS: CPT | Performed by: PHYSICAL MEDICINE & REHABILITATION

## 2021-01-20 PROCEDURE — 3209999900 FLUORO FOR SURGICAL PROCEDURES

## 2021-01-20 PROCEDURE — 7100000010 HC PHASE II RECOVERY - FIRST 15 MIN: Performed by: PHYSICAL MEDICINE & REHABILITATION

## 2021-01-20 RX ORDER — LIDOCAINE HYDROCHLORIDE 10 MG/ML
INJECTION, SOLUTION INFILTRATION; PERINEURAL PRN
Status: DISCONTINUED | OUTPATIENT
Start: 2021-01-20 | End: 2021-01-20 | Stop reason: ALTCHOICE

## 2021-01-20 RX ORDER — FENTANYL CITRATE 50 UG/ML
INJECTION, SOLUTION INTRAMUSCULAR; INTRAVENOUS PRN
Status: DISCONTINUED | OUTPATIENT
Start: 2021-01-20 | End: 2021-01-20 | Stop reason: ALTCHOICE

## 2021-01-20 RX ORDER — TRIAMCINOLONE ACETONIDE 40 MG/ML
INJECTION, SUSPENSION INTRA-ARTICULAR; INTRAMUSCULAR PRN
Status: DISCONTINUED | OUTPATIENT
Start: 2021-01-20 | End: 2021-01-20 | Stop reason: ALTCHOICE

## 2021-01-20 RX ORDER — MIDAZOLAM HYDROCHLORIDE 1 MG/ML
INJECTION INTRAMUSCULAR; INTRAVENOUS PRN
Status: DISCONTINUED | OUTPATIENT
Start: 2021-01-20 | End: 2021-01-20 | Stop reason: ALTCHOICE

## 2021-01-20 ASSESSMENT — PAIN DESCRIPTION - DESCRIPTORS: DESCRIPTORS: CONSTANT

## 2021-01-20 ASSESSMENT — PAIN - FUNCTIONAL ASSESSMENT: PAIN_FUNCTIONAL_ASSESSMENT: 0-10

## 2021-01-20 NOTE — PROGRESS NOTES
1255: Patient to phase 2 recovery room via cart. Patient is awake and alert. Report received from surgical RN, Clementine Fuentes. Patient's vitals obtained, see charting. 1257: Patient's IV is INT'd. Patient is denying pain and nausea at this time. 1259: Offered drink given to the patient. Bed isin the lowest position and call light is within reach. 1339: Patient ambulated to the car and discharged home in stable condition with her family.

## 2021-01-20 NOTE — OP NOTE
Operative Note      Patient: Tanmay Monzon  YOB: 1960  MRN: [id number]    Date of Procedure: 1/20/2021    Pre-Op Diagnosis: Lumbar spondylosis    Post-Op Diagnosis: Same       Procedure(s):  bilateral C5-6-7 medial branch block    Surgeon(s):  Sheri Melo MD    Assistant:   * No surgical staff found *    Anesthesia: IV Sedation    Estimated Blood Loss (mL): Minimal    Complications: None    Moderate Sedation Time: > 15 minutes    CONSENT:     The risks, benefits, alternatives, plan, complications, and personnel were discussed prior to the procedure. The patient understood and agreed to proceed. PROCEDURE:     Anesthesia: Moderate sedation using 2 mg IV versed & 100 mcg IV fentanyl. The patient was positioned supine. Sign-in and time-out was performed. Identifying the site, in this case, both sides cervical C5, C6 and C7. Sterile technique was done in the usual manner using chlorhexidine. This was followed by infiltration of a 10 ml of 1% preservative-free lidocaine. A 3.5 inch  22-gauge spinal needle was positioned under fluoroscopic guidance. Upon confirmation that the needle was properly positioned, 0.5 cc of 240 mg of Omnipaque was injected. This was followed by injecting a solution of 80 mg of triamcinolone and  4ml of 1% preservative-free lidocaine was injected without difficulty. Patient tolerated the procedure well and went to recovery room with stable vital signs. PLAN:     Home instructions were provided. The patient will follow up in 4 to 6 weeks or earlier if needed.        Electronically signed by Sheri Melo MD on 1/20/2021 at 12:36 PM

## 2021-01-20 NOTE — H&P
Past Medical History:   Diagnosis Date    Anxiety     Chronic back pain     [facility])     Depression     Disc disorder     Em[facility]ma [facility] lung ([facility])     Fibromyalgia     Hyperlipidemia     Hypertension     Osteoarthritis     Osteoporosis        Past Surgical History:   Procedure Laterality Date    FACET JOINT INJECTION Bilateral 5/14/2020    bilat. L3, L4 medial branch and L5 dorsal rami injection performed by Dez Ascencio MD at [place] [address] Bilateral 6/17/2020    bilateral C5-6-7 medial branch block performed by Dez Ascencio MD at [address] [place] [address] ARTHROSCOPY Bilateral 2019    PAIN MANAGEMENT PROCEDURE Bilateral 7/22/2020    bilateral L3-4 medial branch L5 dorsal rami performed by Dez Ascencio MD at [place] [address] Right 10/7/2020    RFA, L3-4 medial branch and L5 dorsal rami, right. performed by Dez Ascencio MD at [place] [address] Left 11/4/2020    RFA, L3-4 medial branch and L5 dorsal rami, left. performed by Dez Ascencio MD at [address]       Prior to Visit Medications    Medication Sig Taking?  Authorizing Provider   dilTIAZem (CARDIZEM) 30 MG tablet take 1 tablet by mouth twice a day Yes Historical Provider, MD   lisinopril (PRINIVIL;ZESTRIL) 5 MG tablet  Yes Historical Provider, MD   cyclobenzaprine (FLEXERIL) 10 MG tablet MAY TAKE 1 ONE TABLET EVERY 8 HOURS AS NEEDED FOR MUSCLE PAIN Yes Historical Provider, MD   amitriptyline (ELAVIL) 50 MG tablet Take 1 tablet by mouth daily Yes Historical Provider, MD   aspirin 81 MG tablet Take 81 mg by mouth daily  Historical Provider, MD   simvastatin (ZOCOR) 20 MG tablet take 1 tablet by mouth every evening  Historical Provider, MD RA VITAMIN D-3 125 MCG (5000 UT) CAPS capsule take 1 capsule by mouth once daily WITH SUPPER  Historical Provider, MD   calcium carbonate 1500 (600 Ca) MG TABS tablet   Historical Provider, MD   albuterol sulfate  (90 Base) MCG/ACT inhaler inhale 2 puffs by mouth every 4 hours if needed for SPASMODIC COU. ..  (REFER TO PRESCRIPTION NOTES). Historical Provider, MD   ibuprofen (ADVIL;MOTRIN) 800 MG tablet Take 1 tablet by mouth 3 times daily  Historical Provider, MD   Ascorbic Acid (VITAMIN C) 500 MG tablet Take by mouth daily  Historical Provider, MD   VITAMIN D, CHOLECALCIFEROL, PO Take 1 tablet by mouth daily  Historical Provider, MD         Review of Systems : All systems reviewed, all unremarkable other than HPI. No change since last visit. Physical Exam  Constitutional:       General: She is not in acute distress. Cardiovascular:      Pulses: Normal pulses. Pulmonary:      Effort: Pulmonary effort is normal. No respiratory distress. Breath sounds: Normal breath sounds. Neurological:      General: No focal deficit present. Mental Status: She is alert and oriented to person, place, and time. Psychiatric:         Behavior: Behavior normal.       Cervical Spine Exam: Full ROM, without limitation     Access: Adequate mouth opening     Assessment:     Cervical spondylosis    PLAN:     As advertised. Planned duration of treatment: 4 weeks. Further assessment and followup in  4 weeks for follow up post injection.        Electronically signed by Bindu Collins MD on 1/20/2021 at 12:34 PM

## 2021-02-24 ENCOUNTER — OFFICE VISIT (OUTPATIENT)
Dept: PHYSICAL MEDICINE AND REHAB | Age: 61
End: 2021-02-24
Payer: MEDICARE

## 2021-02-24 VITALS
TEMPERATURE: 97.1 F | SYSTOLIC BLOOD PRESSURE: 128 MMHG | WEIGHT: 164 LBS | DIASTOLIC BLOOD PRESSURE: 84 MMHG | HEIGHT: 66 IN | BODY MASS INDEX: 26.36 KG/M2

## 2021-02-24 DIAGNOSIS — M47.812 SPONDYLOSIS OF CERVICAL REGION WITHOUT MYELOPATHY OR RADICULOPATHY: Primary | ICD-10-CM

## 2021-02-24 PROCEDURE — G8484 FLU IMMUNIZE NO ADMIN: HCPCS | Performed by: PHYSICAL MEDICINE & REHABILITATION

## 2021-02-24 PROCEDURE — 4004F PT TOBACCO SCREEN RCVD TLK: CPT | Performed by: PHYSICAL MEDICINE & REHABILITATION

## 2021-02-24 PROCEDURE — G8419 CALC BMI OUT NRM PARAM NOF/U: HCPCS | Performed by: PHYSICAL MEDICINE & REHABILITATION

## 2021-02-24 PROCEDURE — 3017F COLORECTAL CA SCREEN DOC REV: CPT | Performed by: PHYSICAL MEDICINE & REHABILITATION

## 2021-02-24 PROCEDURE — 99213 OFFICE O/P EST LOW 20 MIN: CPT | Performed by: PHYSICAL MEDICINE & REHABILITATION

## 2021-02-24 PROCEDURE — G8427 DOCREV CUR MEDS BY ELIG CLIN: HCPCS | Performed by: PHYSICAL MEDICINE & REHABILITATION

## 2021-02-24 NOTE — PROGRESS NOTES
FOLLOW UP APPOINTMENT:       Dez Ascencio MD    2/24/2021       Gladys Maxwell is a 56 y.o. female, who came for a   post injection follow up    Cause of the symptom(s): degenerative (arthritis)    Onset:  Chronic    Post injection  - bilateral C5-6-7 medial branch block, 1/20/2021    Pain level is minimal.     Denies side effects from the injection.        Allergies   Allergen Reactions    Nsaids Other (See Comments)     STOPPED BREATHING    Pcn [Penicillins] Other (See Comments)     DIZZINESS    Medrol [Methylprednisolone] Palpitations       Social History     Socioeconomic History    Marital status:      Spouse name: Not on file    Number of children: Not on file    Years of education: Not on file    Highest education level: Not on file   Occupational History    Not on file   Social Needs    Financial resource strain: Not on file    Food insecurity     Worry: Not on file     Inability: Not on file    Transportation needs     Medical: Not on file     Non-medical: Not on file   Tobacco Use    Smoking status: Current Every Day Smoker     Packs/day: 1.00     Types: Cigarettes    Smokeless tobacco: Never Used   Substance and Sexual Activity    Alcohol use: Yes     Comment: 2 BEERS A MONTH     Drug use: Not Currently    Sexual activity: Not on file   Lifestyle    Physical activity     Days per week: Not on file     Minutes per session: Not on file    Stress: Not on file   Relationships    Social connections     Talks on phone: Not on file     Gets together: Not on file     Attends Anglican service: Not on file     Active member of club or organization: Not on file     Attends meetings of clubs or organizations: Not on file     Relationship status: Not on file    Intimate partner violence     Fear of current or ex partner: Not on file     Emotionally abused: Not on file     Physically abused: Not on file     Forced sexual activity: Not on file   Other Topics Concern  Not on file   Social History Narrative    Not on file       Past Medical History:   Diagnosis Date    Anxiety     Chronic back pain     [facility]     Depression     Disc disorder     Emphysema [facility] lung [facility]     Fibromyalgia     Hyperlipidemia     Hypertension     Osteoarthritis     Osteoporosis        Past Surgical History:   Procedure Laterality Date    FACET JOINT INJECTION Bilateral 5/14/2020    bilat. L3, L4 medial branch and L5 dorsal rami injection performed by Rayleen Sacks, MD at [place] [address] Bilateral 6/17/2020    bilateral C5-6-7 medial branch block performed by Rayleen Sacks, MD at [address] INJECTION Bilateral 1/20/2021    bilateral C5-6-7 medial branch block performed by Rayleen Sacks, MD at [address] ARTHROSCOPY Bilateral 2019    PAIN MANAGEMENT PROCEDURE Bilateral 7/22/2020    bilateral L3-4 medial branch L5 dorsal rami performed by Rayleen Sacks, MD at [place] [address] Right 10/7/2020    RFA, L3-4 medial branch and L5 dorsal rami, right. performed by Rayleen Sacks, MD at [place] [address] Left 11/4/2020    RFA, L3-4 medial branch and L5 dorsal rami, left. performed by Rayleen Sacks, MD at [address] [place] [address]       Family History   Problem Relation Age of Onset    Heart Disease Father     Diabetes Father     High Blood Pressure Father     COPD Father     Stroke Sister     Early Death Brother         Prior to Visit Medications    Medication Sig Taking?  Authorizing Provider   aspirin 81 MG tablet Take 81 mg by mouth daily Yes Historical Provider, MD   dilTIAZem (CARDIZEM) 30 MG tablet take 1 tablet by mouth twice a day Yes Historical Provider, MD   lisinopril (PRINIVIL;ZESTRIL) 5 MG tablet  Yes Historical Provider, MD simvastatin (ZOCOR) 20 MG tablet take 1 tablet by mouth every evening Yes Historical Provider, MD   cyclobenzaprine (FLEXERIL) 10 MG tablet MAY TAKE 1 ONE TABLET EVERY 8 HOURS AS NEEDED FOR MUSCLE PAIN Yes Historical Provider, MD ANSARI VITAMIN D-3 125 MCG (5000 UT) CAPS capsule take 1 capsule by mouth once daily WITH SUPPER Yes Historical Provider, MD   calcium carbonate 1500 (600 Ca) MG TABS tablet  Yes Historical Provider, MD   albuterol sulfate  (90 Base) MCG/ACT inhaler inhale 2 puffs by mouth every 4 hours if needed for SPASMODIC COU. ..  (REFER TO PRESCRIPTION NOTES). Yes Historical Provider, MD   ibuprofen (ADVIL;MOTRIN) 800 MG tablet Take 1 tablet by mouth 3 times daily Yes Historical Provider, MD   amitriptyline (ELAVIL) 50 MG tablet Take 1 tablet by mouth daily Yes Historical Provider, MD   Ascorbic Acid (VITAMIN C) 500 MG tablet Take by mouth daily Yes Historical Provider, MD   VITAMIN D, CHOLECALCIFEROL, PO Take 1 tablet by mouth daily Yes Historical Provider, MD       Review of Systems  : All systems reviewed, all unremarkable other than HPI/subjective. No change since last visit. Denies  fever, chills, infection or non healing wound. /84   Temp 97.1 °F (36.2 °C)   Ht 5' 6\" (1.676 m)   Wt 164 lb (74.4 kg)   BMI 26.47 kg/m²       Physical Exam  Constitutional:       General: She is not in acute distress. HENT:      Head: Atraumatic. Neck:      Musculoskeletal: Neck supple. Pulmonary:      Effort: Pulmonary effort is normal. No respiratory distress. Breath sounds: Normal breath sounds. Skin:     General: Skin is dry. Neurological:      Mental Status: She is alert and oriented to person, place, and time. Assessment and Plan:      Diagnosis Orders   1. Spondylosis of cervical region without myelopathy or radiculopathy         FF up PRN. If pain recurs, will schedule for another injection. All questions were answered and imaging studies reviewed with the patient. I have reviewed the chief complaint and HPI including the [facility] and Vital documentation by my staff and I agree with their documentation and have added where applicable. Time spent with patient was 15  minutes. More than 50% was spent counseling/coordinating the patient's care.        Jaclyn Negro MD   Spine Medicine/PM&R

## 2021-05-26 ENCOUNTER — OFFICE VISIT (OUTPATIENT)
Dept: PHYSICAL MEDICINE AND REHAB | Age: 61
End: 2021-05-26
Payer: MEDICARE

## 2021-05-26 VITALS
HEIGHT: 66 IN | BODY MASS INDEX: 26.36 KG/M2 | DIASTOLIC BLOOD PRESSURE: 80 MMHG | SYSTOLIC BLOOD PRESSURE: 128 MMHG | WEIGHT: 164 LBS

## 2021-05-26 DIAGNOSIS — M47.816 LUMBAR SPONDYLOSIS: Primary | ICD-10-CM

## 2021-05-26 DIAGNOSIS — M47.812 SPONDYLOSIS OF CERVICAL REGION WITHOUT MYELOPATHY OR RADICULOPATHY: ICD-10-CM

## 2021-05-26 PROCEDURE — 4004F PT TOBACCO SCREEN RCVD TLK: CPT | Performed by: PAIN MEDICINE

## 2021-05-26 PROCEDURE — G8419 CALC BMI OUT NRM PARAM NOF/U: HCPCS | Performed by: PAIN MEDICINE

## 2021-05-26 PROCEDURE — 3017F COLORECTAL CA SCREEN DOC REV: CPT | Performed by: PAIN MEDICINE

## 2021-05-26 PROCEDURE — G8427 DOCREV CUR MEDS BY ELIG CLIN: HCPCS | Performed by: PAIN MEDICINE

## 2021-05-26 PROCEDURE — 99214 OFFICE O/P EST MOD 30 MIN: CPT | Performed by: PAIN MEDICINE

## 2021-05-26 RX ORDER — PREDNISONE 2.5 MG
2.5 TABLET ORAL DAILY
Status: ON HOLD | COMMUNITY
End: 2021-12-21

## 2021-05-26 RX ORDER — LORATADINE 10 MG/1
10 TABLET ORAL DAILY
COMMUNITY

## 2021-05-26 NOTE — PROGRESS NOTES
[address] [facility] [address]  Dept: [phone]  Dept Fax: [phone]: [phone]    Visit Date: 5/26/2021    Functionality Assessment/Goals Worksheet     On a scale of 0 (Does not Interfere) to 10 (Completely Interferes)     1. Which number describes how during the past week pain has interfered with       the following:  A. General Activity:  8  B. Mood: 8  C. Walking Ability:  8  D. Normal Work (Includes both work outside the home and housework):  8  E. Relations with Other People:   8  F. Sleep:   8  G. Enjoyment of Life:   8    2. Patient Prefers to Take their Pain Medications:     []  On a regular basis   []  Only when necessary    []  Does not take pain medications    3. What are the Patient's Goals/Expectations for Visiting Pain Management? []  Learn about my pain    []  Receive Medication   []  Physical Therapy     []  Treat Depression   []  Receive Injections    []  Treat Sleep   []  Deal with Anxiety and Stress   []  Treat Opoid Dependence/Addiction   []  Other:      HPI:   Renée Fabian is a 56 y.o. female is here today for    Chief Complaint: Lower back and Neck pain    HPI     Prior patient of Dr Justo Giraldo. States L-facet RFA's @L3-4,L4-5 and L5-S1 Left side are wearing off. Last preformed 11/04/2020 . States L-facet RFA's @ L3-4,L4-5 and L5-S1 Right side preformed 10/7/2020 excellent benefit, wearing off. Medications reviewed. The patientis allergic to nsaids, pcn [penicillins], and medrol [methylprednisolone]. Subjective:      Review of Systems   Constitutional: Positive for activity change. Negative for appetite change, chills, diaphoresis, fatigue, fever and unexpected weight change. HENT: Negative for congestion, ear pain, hearing loss, mouth sores, nosebleeds, rhinorrhea, sinus pressure and sore throat.     Eyes: Negative for photophobia, pain and visual disturbance. Respiratory: Negative for cough, chest tightness, shortness of breath and wheezing. Cardiovascular: Negative for chest pain and palpitations. Gastrointestinal: Negative for abdominal pain, constipation, diarrhea, nausea and vomiting. Endocrine: Negative for cold intolerance, heat intolerance, polydipsia, polyphagia and polyuria. Genitourinary: Negative for decreased urine volume, difficulty urinating, frequency and hematuria. Musculoskeletal: Positive for back pain and gait problem. Negative for arthralgias, joint swelling, myalgias, neck pain and neck stiffness. Skin: Negative for color change and rash. Allergic/Immunologic: Negative for food allergies and immunocompromised state. Neurological: Negative for dizziness, tremors, seizures, syncope, facial asymmetry, speech difficulty, weakness, light-headedness, numbness and headaches. Hematological: Does not bruise/bleed easily. Psychiatric/Behavioral: Positive for sleep disturbance. Negative for agitation, behavioral problems, confusion, decreased concentration, dysphoric mood, hallucinations, self-injury and suicidal ideas. The patient is not nervous/anxious and is not hyperactive. Objective:     Vitals:    05/26/21 1550   BP: 128/80   Weight: 164 lb (74.4 kg)   Height: 5' 6\" (1.676 m)       Physical Exam  Vitals and nursing note reviewed. Constitutional:       General: She is not in acute distress. Appearance: She is well-developed. She is not diaphoretic. HENT:      Head: Normocephalic and atraumatic. Right Ear: External ear normal.      Left Ear: External ear normal.      Nose: Nose normal.      Mouth/Throat:      Pharynx: No oropharyngeal exudate. Eyes:      General: No scleral icterus. Right eye: No discharge. Left eye: No discharge. Conjunctiva/sclera: Conjunctivae normal.      Pupils: Pupils are equal, round, and reactive to light.    Neck:      Thyroid: No thyromegaly. Cardiovascular:      Rate and Rhythm: Normal rate and regular rhythm. Heart sounds: Normal heart sounds. No murmur heard. No friction rub. No gallop. Pulmonary:      Effort: Pulmonary effort is normal. No respiratory distress. Breath sounds: Normal breath sounds. No wheezing or rales. Chest:      Chest wall: No tenderness. Abdominal:      General: Bowel sounds are normal. There is no distension. Palpations: Abdomen is soft. Tenderness: There is no abdominal tenderness. There is no guarding or rebound. Musculoskeletal:      Cervical back: Neck supple. Spasms and tenderness present. No edema, erythema or rigidity. No muscular tenderness. Decreased range of motion. Thoracic back: Tenderness present. Lumbar back: Tenderness present. Decreased range of motion. Back:    Skin:     General: Skin is warm. Coloration: Skin is not pale. Findings: No erythema or rash. Neurological:      Mental Status: She is alert and oriented to person, place, and time. She is not disoriented. Cranial Nerves: No cranial nerve deficit. Sensory: No sensory deficit. Motor: No atrophy or abnormal muscle tone. Coordination: Coordination normal.      Gait: Gait normal.      Deep Tendon Reflexes: Reflexes are normal and symmetric. Babinski sign absent on the right side. Babinski sign absent on the left side. Psychiatric:         Attention and Perception: Attention normal. She is attentive. Mood and Affect: Mood normal. Mood is not anxious or depressed. Affect is not labile, blunt, angry or inappropriate. Speech: Speech normal. She is communicative. Speech is not rapid and pressured, delayed, slurred or tangential.         Behavior: Behavior normal. Behavior is not agitated, slowed, aggressive, withdrawn, hyperactive or combative. Behavior is cooperative. Thought Content:  Thought content normal. Thought content is not paranoid or

## 2021-05-27 ENCOUNTER — TELEPHONE (OUTPATIENT)
Dept: PHYSICAL MEDICINE AND REHAB | Age: 61
End: 2021-05-27

## 2021-06-01 ENCOUNTER — TELEPHONE (OUTPATIENT)
Dept: PHYSICAL MEDICINE AND REHAB | Age: 61
End: 2021-06-01

## 2021-06-01 NOTE — TELEPHONE ENCOUNTER
Pt denies taking any blood thinners except ASA 81 mg. (no cardiac events in the last 6 months) prior to scheduling procedure. Procedure and follow up scheduled. Educated on pre-procedure instructions, also mailed.

## 2021-06-02 ASSESSMENT — ENCOUNTER SYMPTOMS
SINUS PRESSURE: 0
COUGH: 0
VOMITING: 0
COLOR CHANGE: 0
SORE THROAT: 0
CHEST TIGHTNESS: 0
WHEEZING: 0
RHINORRHEA: 0
BACK PAIN: 1
NAUSEA: 0
PHOTOPHOBIA: 0
ABDOMINAL PAIN: 0
CONSTIPATION: 0
EYE PAIN: 0
SHORTNESS OF BREATH: 0
DIARRHEA: 0

## 2021-06-07 ENCOUNTER — TELEPHONE (OUTPATIENT)
Dept: PHYSICAL MEDICINE AND REHAB | Age: 61
End: 2021-06-07

## 2021-06-07 NOTE — TELEPHONE ENCOUNTER
Pt scheduled for Lumbar RFA right side first tomorrow at 1:45pm.  Would like to do Left side first instead if possible due to having much pain on left side. Updated op note to left side.

## 2021-06-08 ENCOUNTER — APPOINTMENT (OUTPATIENT)
Dept: GENERAL RADIOLOGY | Age: 61
End: 2021-06-08
Attending: PAIN MEDICINE
Payer: MEDICARE

## 2021-06-08 ENCOUNTER — ANESTHESIA (OUTPATIENT)
Dept: OPERATING ROOM | Age: 61
End: 2021-06-08
Payer: MEDICARE

## 2021-06-08 ENCOUNTER — HOSPITAL ENCOUNTER (OUTPATIENT)
Age: 61
Setting detail: OUTPATIENT SURGERY
Discharge: HOME | End: 2021-06-08
Attending: PAIN MEDICINE | Admitting: PAIN MEDICINE
Payer: MEDICARE

## 2021-06-08 ENCOUNTER — ANESTHESIA EVENT (OUTPATIENT)
Dept: OPERATING ROOM | Age: 61
End: 2021-06-08
Payer: MEDICARE

## 2021-06-08 VITALS
RESPIRATION RATE: 17 BRPM | OXYGEN SATURATION: 97 % | SYSTOLIC BLOOD PRESSURE: 175 MMHG | TEMPERATURE: 96.8 F | DIASTOLIC BLOOD PRESSURE: 98 MMHG

## 2021-06-08 VITALS
BODY MASS INDEX: 26.09 KG/M2 | HEIGHT: 67 IN | SYSTOLIC BLOOD PRESSURE: 128 MMHG | HEART RATE: 83 BPM | TEMPERATURE: 98 F | OXYGEN SATURATION: 100 % | RESPIRATION RATE: 12 BRPM | DIASTOLIC BLOOD PRESSURE: 83 MMHG | WEIGHT: 166.2 LBS

## 2021-06-08 PROCEDURE — 3700000001 HC ADD 15 MINUTES (ANESTHESIA): Performed by: PAIN MEDICINE

## 2021-06-08 PROCEDURE — 2580000003 HC RX 258: Performed by: NURSE ANESTHETIST, CERTIFIED REGISTERED

## 2021-06-08 PROCEDURE — 6360000002 HC RX W HCPCS: Performed by: PAIN MEDICINE

## 2021-06-08 PROCEDURE — 3209999900 FLUORO FOR SURGICAL PROCEDURES

## 2021-06-08 PROCEDURE — 64636 DESTROY L/S FACET JNT ADDL: CPT | Performed by: PAIN MEDICINE

## 2021-06-08 PROCEDURE — 3600000057 HC PAIN LEVEL 4 ADDL 15 MIN: Performed by: PAIN MEDICINE

## 2021-06-08 PROCEDURE — 3700000000 HC ANESTHESIA ATTENDED CARE: Performed by: PAIN MEDICINE

## 2021-06-08 PROCEDURE — 2709999900 HC NON-CHARGEABLE SUPPLY: Performed by: PAIN MEDICINE

## 2021-06-08 PROCEDURE — 64635 DESTROY LUMB/SAC FACET JNT: CPT | Performed by: PAIN MEDICINE

## 2021-06-08 PROCEDURE — 3600000056 HC PAIN LEVEL 4 BASE: Performed by: PAIN MEDICINE

## 2021-06-08 PROCEDURE — 7100000011 HC PHASE II RECOVERY - ADDTL 15 MIN: Performed by: PAIN MEDICINE

## 2021-06-08 PROCEDURE — 2500000003 HC RX 250 WO HCPCS: Performed by: PAIN MEDICINE

## 2021-06-08 PROCEDURE — 7100000010 HC PHASE II RECOVERY - FIRST 15 MIN: Performed by: PAIN MEDICINE

## 2021-06-08 PROCEDURE — 2500000003 HC RX 250 WO HCPCS: Performed by: NURSE ANESTHETIST, CERTIFIED REGISTERED

## 2021-06-08 PROCEDURE — 6360000002 HC RX W HCPCS: Performed by: NURSE ANESTHETIST, CERTIFIED REGISTERED

## 2021-06-08 RX ORDER — METHOCARBAMOL 750 MG/1
750 TABLET, FILM COATED ORAL 4 TIMES DAILY
COMMUNITY

## 2021-06-08 RX ORDER — LIDOCAINE HYDROCHLORIDE 10 MG/ML
INJECTION, SOLUTION EPIDURAL; INFILTRATION; INTRACAUDAL; PERINEURAL PRN
Status: DISCONTINUED | OUTPATIENT
Start: 2021-06-08 | End: 2021-06-08 | Stop reason: ALTCHOICE

## 2021-06-08 RX ORDER — PROPOFOL 10 MG/ML
INJECTION, EMULSION INTRAVENOUS PRN
Status: DISCONTINUED | OUTPATIENT
Start: 2021-06-08 | End: 2021-06-08 | Stop reason: SDUPTHER

## 2021-06-08 RX ORDER — METHYLPREDNISOLONE ACETATE 80 MG/ML
INJECTION, SUSPENSION INTRA-ARTICULAR; INTRALESIONAL; INTRAMUSCULAR; SOFT TISSUE PRN
Status: DISCONTINUED | OUTPATIENT
Start: 2021-06-08 | End: 2021-06-08 | Stop reason: ALTCHOICE

## 2021-06-08 RX ORDER — FENTANYL CITRATE 50 UG/ML
INJECTION, SOLUTION INTRAMUSCULAR; INTRAVENOUS PRN
Status: DISCONTINUED | OUTPATIENT
Start: 2021-06-08 | End: 2021-06-08 | Stop reason: SDUPTHER

## 2021-06-08 RX ORDER — LIDOCAINE HYDROCHLORIDE 20 MG/ML
INJECTION, SOLUTION EPIDURAL; INFILTRATION; INTRACAUDAL; PERINEURAL PRN
Status: DISCONTINUED | OUTPATIENT
Start: 2021-06-08 | End: 2021-06-08 | Stop reason: SDUPTHER

## 2021-06-08 RX ORDER — BUPIVACAINE HYDROCHLORIDE 2.5 MG/ML
INJECTION, SOLUTION EPIDURAL; INFILTRATION; INTRACAUDAL PRN
Status: DISCONTINUED | OUTPATIENT
Start: 2021-06-08 | End: 2021-06-08 | Stop reason: ALTCHOICE

## 2021-06-08 RX ORDER — SODIUM CHLORIDE 9 MG/ML
INJECTION INTRAVENOUS PRN
Status: DISCONTINUED | OUTPATIENT
Start: 2021-06-08 | End: 2021-06-08 | Stop reason: SDUPTHER

## 2021-06-08 RX ADMIN — LIDOCAINE HYDROCHLORIDE 60 MG: 20 INJECTION, SOLUTION EPIDURAL; INFILTRATION; INTRACAUDAL; PERINEURAL at 14:10

## 2021-06-08 RX ADMIN — SODIUM CHLORIDE 8 ML: 9 INJECTION, SOLUTION INTRAMUSCULAR; INTRAVENOUS; SUBCUTANEOUS at 14:14

## 2021-06-08 RX ADMIN — FENTANYL CITRATE 100 MCG: 50 INJECTION, SOLUTION INTRAMUSCULAR; INTRAVENOUS at 14:03

## 2021-06-08 RX ADMIN — PROPOFOL 130 MG: 10 INJECTION, EMULSION INTRAVENOUS at 14:10

## 2021-06-08 ASSESSMENT — PULMONARY FUNCTION TESTS
PIF_VALUE: 0

## 2021-06-08 ASSESSMENT — PAIN DESCRIPTION - DESCRIPTORS: DESCRIPTORS: THROBBING

## 2021-06-08 ASSESSMENT — PAIN - FUNCTIONAL ASSESSMENT: PAIN_FUNCTIONAL_ASSESSMENT: 0-10

## 2021-06-08 ASSESSMENT — PAIN SCALES - GENERAL: PAINLEVEL_OUTOF10: 0

## 2021-06-08 NOTE — H&P
[address] [address]  History and Physical Update    Pt Name: Libby Adler  MRN: [id number]  YOB: 1960  Date of evaluation: 6/8/2021      I have examined the patient and reviewed the H&P/Consult and there are no changes to the patient or plans.         Electronically signed by Fortino Mccrary MD on 6/8/2021 at 12:43 PM

## 2021-06-08 NOTE — ANESTHESIA PRE PROCEDURE
Department of Anesthesiology  Preprocedure Note       Name:  Tapan Rebolledo   Age:  56 y.o.  :  1960                                          MRN:  [id number]         Date:  2021      Surgeon: William Marino):  Fatou Hawk MD    Procedure: Procedure(s):  Bilateral L-facet RFA's @ L3-4,L4-5 and L5-S1  Left side first per patient request    Medications prior to admission:   Prior to Admission medications    Medication Sig Start Date End Date Taking? Authorizing Provider   methocarbamol (ROBAXIN) 750 MG tablet Take 750 mg by mouth 4 times daily   Yes Historical Provider, MD   loratadine (CLARITIN) 10 MG tablet Take 10 mg by mouth daily   Yes Historical Provider, MD   dilTIAZem (CARDIZEM) 30 MG tablet take 1 tablet by mouth twice a day 20  Yes Historical Provider, MD   lisinopril (PRINIVIL;ZESTRIL) 5 MG tablet  20  Yes Historical Provider, MD   simvastatin (ZOCOR) 20 MG tablet take 1 tablet by mouth every evening 20  Yes Historical Provider, MD   albuterol sulfate  (90 Base) MCG/ACT inhaler inhale 2 puffs by mouth every 4 hours if needed for SPASMODIC COU. ..  (REFER TO PRESCRIPTION NOTES).  20  Yes Historical Provider, MD   amitriptyline (ELAVIL) 50 MG tablet Take 1 tablet by mouth daily 16  Yes Historical Provider, MD   predniSONE (DELTASONE) 2.5 MG tablet Take 2.5 mg by mouth daily Take for 10 days    Historical Provider, MD   aspirin 81 MG tablet Take 81 mg by mouth daily    Historical Provider, MD   cyclobenzaprine (FLEXERIL) 10 MG tablet MAY TAKE 1 ONE TABLET EVERY 8 HOURS AS NEEDED FOR MUSCLE PAIN 20   Historical Provider, MD ANSARI VITAMIN D-3 125 MCG (5000 UT) CAPS capsule take 1 capsule by mouth once daily WITH SUPPER 20   Historical Provider, MD   calcium carbonate 1500 (600 Ca) MG TABS tablet  20   Historical Provider, MD   ibuprofen (ADVIL;MOTRIN) 800 MG tablet Take 1 tablet by mouth 3 times daily 16   Historical Provider, MD   Ascorbic Acid (VITAMIN C) 500 MG tablet Take by mouth daily    Historical Provider, MD   VITAMIN D, CHOLECALCIFEROL, PO Take 1 tablet by mouth daily    Historical Provider, MD       Current medications:    No current facility-administered medications for this encounter. Allergies: Allergies   Allergen Reactions    Nsaids Other (See Comments)     STOPPED BREATHING    Pcn [Penicillins] Other (See Comments)     DIZZINESS    Medrol [Methylprednisolone] Palpitations       Problem List:    Patient Active Problem List   Diagnosis Code    PVD (peripheral vascular disease) ([facility] [address] I73.9    Lumbar spondylosis M47.816    Cervical spondylosis M47.812       Past Medical History:        Diagnosis Date    Anxiety     Chronic back pain     COPD (chronic obstructive pulmonary disease) ([facility] [address]     Depression     Disc disorder     Emphysema of lung ([facility] [address]     Fibromyalgia     Hyperlipidemia     Hypertension     Osteoarthritis     Osteoporosis        Past Surgical History:        Procedure Laterality Date    FACET JOINT INJECTION Bilateral 5/14/2020    bilat. L3, L4 medial branch and L5 dorsal rami injection performed by Lolly Richards MD at [place] [address] Bilateral 6/17/2020    bilateral C5-6-7 medial branch block performed by Lolly Richards MD at [facility] INJECTION Bilateral 1/20/2021    bilateral C5-6-7 medial branch block performed by Lolly Richards MD at [facility] ARTHROSCOPY Bilateral 2019    PAIN MANAGEMENT PROCEDURE Bilateral 7/22/2020    bilateral L3-4 medial branch L5 dorsal rami performed by Lolly Richards MD at [facility] [address] Right 10/7/2020    RFA, L3-4 medial branch and L5 dorsal rami, right. performed by Lolly Richards MD at [place] [address] Left 11/4/2020    RFA, L3-4 medial branch and L5 dorsal rami, left.  performed by Rahel Lebron MD Heriberto at [address]       Social History:    Social History     Tobacco Use    Smoking status: Current Every Day Smoker     Packs/day: 1.00     Types: Cigarettes    Smokeless tobacco: Never Used   Substance Use Topics    Alcohol use: Yes     Comment: 2 BEERS A MONTH                                 Ready to quit: Not Answered  Counseling given: Not Answered      Vital Signs (Current):   Vitals:    06/08/21 1322   BP: (!) 148/81   Pulse: 78   Resp: 16   Temp: 96.4 °F (35.8 °C)   TempSrc: Skin   SpO2: 98%   Weight: 166 lb 3.2 oz (75.4 kg)   Height: 5' 7\" (1.702 m)                                              BP Readings from Last 3 Encounters:   06/08/21 (!) 148/81   05/26/21 128/80   02/24/21 128/84       NPO Status: Time of last liquid consumption: 0700 (sip)                        Time of last solid consumption: 2000                        Date of last liquid consumption: 06/08/21                        Date of last solid food consumption: 06/07/21    BMI:   Wt Readings from Last 3 Encounters:   06/08/21 166 lb 3.2 oz (75.4 kg)   05/26/21 164 lb (74.4 kg)   02/24/21 164 lb (74.4 kg)     Body mass index is 26.03 kg/m². CBC: No results found for: WBC, RBC, HGB, HCT, MCV, RDW, PLT    CMP: No results found for: NA, K, CL, CO2, BUN, CREATININE, GFRAA, AGRATIO, LABGLOM, GLUCOSE, PROT, CALCIUM, BILITOT, ALKPHOS, AST, ALT    POC Tests: No results for input(s): POCGLU, POCNA, POCK, POCCL, POCBUN, POCHEMO, POCHCT in the last 72 hours.     Coags: No results found for: PROTIME, INR, APTT    HCG (If Applicable): No results found for: PREGTESTUR, PREGSERUM, HCG, HCGQUANT     ABGs: No results found for: PHART, PO2ART, EVF5RFI, DBP8WFK, BEART, B2LGRYTS     Type & Screen (If Applicable):  No results found for: LABABO, LABRH    Drug/Infectious Status (If Applicable):  No results found for: HIV, HEPCAB    COVID-19 Screening (If Applicable):   Lab Results   Component Value Date    COVID19 Not Detected 07/17/2020           Anesthesia Evaluation  Patient summary reviewed  Airway: Mallampati: II  TM distance: >3 FB   Neck ROM: full  Mouth opening: > = 3 FB Dental:          Pulmonary:   (+) COPD:                             Cardiovascular:    (+) hypertension:,                   Neuro/Psych:   (+) neuromuscular disease:, psychiatric history:            GI/Hepatic/Renal:             Endo/Other:                     Abdominal:           Vascular:                                        Anesthesia Plan      MAC     ASA 2       Induction: intravenous. Anesthetic plan and risks discussed with patient. Plan discussed with MARYBETH. Ana Reyes.  [address] [place] [address]   6/8/2021

## 2021-06-08 NOTE — H&P
H&P    Prior patient of Dr Madelyn Rosario. States L-facet RFA's @L3-4,L4-5 and L5-S1 Left side are wearing off. Last preformed 11/04/2020 .     States L-facet RFA's @ L3-4,L4-5 and L5-S1 Right side preformed 10/7/2020 excellent benefit, wearing off.        Medications reviewed.     The patientis allergic to nsaids, pcn [penicillins], and medrol [methylprednisolone].       Subjective:      Review of Systems   Constitutional: Positive for activity change. Negative for appetite change, chills, diaphoresis, fatigue, fever and unexpected weight change. HENT: Negative for congestion, ear pain, hearing loss, mouth sores, nosebleeds, rhinorrhea, sinus pressure and sore throat. Eyes: Negative for photophobia, pain and visual disturbance. Respiratory: Negative for cough, chest tightness, shortness of breath and wheezing. Cardiovascular: Negative for chest pain and palpitations. Gastrointestinal: Negative for abdominal pain, constipation, diarrhea, nausea and vomiting. Endocrine: Negative for cold intolerance, heat intolerance, polydipsia, polyphagia and polyuria. Genitourinary: Negative for decreased urine volume, difficulty urinating, frequency and hematuria. Musculoskeletal: Positive for back pain and gait problem. Negative for arthralgias, joint swelling, myalgias, neck pain and neck stiffness. Skin: Negative for color change and rash. Allergic/Immunologic: Negative for food allergies and immunocompromised state. Neurological: Negative for dizziness, tremors, seizures, syncope, facial asymmetry, speech difficulty, weakness, light-headedness, numbness and headaches. Hematological: Does not bruise/bleed easily. Psychiatric/Behavioral: Positive for sleep disturbance. Negative for agitation, behavioral problems, confusion, decreased concentration, dysphoric mood, hallucinations, self-injury and suicidal ideas.  The patient is not nervous/anxious and is not hyperactive.          Objective:      Vitals normal.      Gait: Gait normal.      Deep Tendon Reflexes: Reflexes are normal and symmetric. Babinski sign absent on the right side. Babinski sign absent on the left side. Psychiatric:         Attention and Perception: Attention normal. She is attentive. Mood and Affect: Mood normal. Mood is not anxious or depressed. Affect is not labile, blunt, angry or inappropriate. Speech: Speech normal. She is communicative. Speech is not rapid and pressured, delayed, slurred or tangential.         Behavior: Behavior normal. Behavior is not agitated, slowed, aggressive, withdrawn, hyperactive or combative. Behavior is cooperative. Thought Content: Thought content normal. Thought content is not paranoid or delusional. Thought content does not include homicidal or suicidal ideation. Thought content does not include homicidal or suicidal plan. Cognition and Memory: Cognition normal. Memory is not impaired. She does not exhibit impaired recent memory or impaired remote memory. Judgment: Judgment normal. Judgment is not impulsive or inappropriate.            Assessment:      1. Lumbar spondylosis    2. Spondylosis of cervical region without myelopathy or radiculopathy       Plan:      · OARRS reviewed. Current MED: 0  · Patient was not offered naloxone for home. · Discussed long term side effects of medications, tolerance, dependency and addiction. · Previous UDS reviewed  · UDS preformed today for compliance. · Patient told can not receive any pain medications from any other source. · No evidence of abuse, diversion or aberrant behavior. · Medications and/or procedures to improve function and quality of life- patient understanding with this and that may not be pain free  · Discussed with patient about safe storage of medications at home  · Discussed possible weaning of medication dosing dependent on treatment/procedure results.    · Testing: Reviewed MRI Lumbar Spine  · Procedures:

## 2021-06-08 NOTE — OP NOTE
Operative Note    Pre-Procedure Note    Patient Name: Jb Quispe   YOB: 1960  Medical Record Number: [id number]  Date: 6/8/21    Indication:  Lower back pain  Consent: On file. Vital Signs:   Vitals:    06/08/21 1322   BP: (!) 148/81   Pulse: 78   Resp: 16   Temp: 96.4 °F (35.8 °C)   SpO2: 98%       Past Medical History:   has a past medical history of Anxiety, Chronic back pain, COPD (chronic obstructive pulmonary disease) ([facility] [address] Depression, Disc disorder, Emphysema of lung ([facility] [address] Fibromyalgia, Hyperlipidemia, Hypertension, Osteoarthritis, and Osteoporosis. Past Surgical History:   has a past surgical history that includes Tonsillectomy (1972); Knee arthroscopy (Bilateral, 2019); Facet joint injection (Bilateral, 5/14/2020); Facet joint injection (Bilateral, 6/17/2020); Pain management procedure (Bilateral, 7/22/2020); Pain management procedure (Right, 10/7/2020); Pain management procedure (Left, 11/4/2020); and Facet joint injection (Bilateral, 1/20/2021). Pre-Sedation Documentation and Exam:   Vital signs have been reviewed (see flow sheet for vitals). Sedation/ Anesthesia Plan:   MAC    Patient is an appropriate candidate for plan of sedation: yes    Preoperative Diagnosis:  L-spondylosis    Post-Op Dx: as above    Procedure Performed:  :Radiofrequency ablation of median branches at the levels of L3-4,L4-5 and L5-S1 left under fluoroscopic guidance     Indication for the Procedure: The patient has ahistory of chronic low back pain that is not responding well to the conservative treatment. Patient's pain is mostly axial in nature. Pain is interfering with the activities of daily living. Physical examination revealed facet tenderness and facet loading is positive. Patient had undergone lumbar facet joint injections with pain relief that lasted for only a short period of time and had greater than 70% pain relief with confirmatory median branch blocks.   Hence we decided to do radiofrequency abalation of median branches for long term pain releif. The procedure and risks  were discussed with the patient and an informed consent was obtained. Procedure:  Left   A meaningful communication was kept up with the patient throughout the procedure. The patient is placed in prone position and skin over the back was prepped and draped in sterile manner. Then using fluoroscopy the junction of the transverse process of the vertebra with the superior process of the facet joint was observed and the view was optimized. The skin and deep tissues posterior were infiltrated with 10 ml of  1% xylocaine. The RF canula with the 15 mm active tip was introduced through the skin wheal under fluoroscopy guidance such that the tip of the needle lies in the groove of the transverse process with the superior processes of the facet joint. Then a lateral view of the lumbar spine was obtained to make sure the tip of needle is not in the neural foramen. Then electric impedence was checked to make sure it is acceptable. Then a sensory stimulus was applied at 50 Hz up to 1 volt and concordant pain symptoms were reproduced. Then a motor stimulus was applied at 2 Hz up to 2 volts and no motor stimulation was seen in lower extremities. Some multifidus stimulus was seen. Then after negative aspiration a mixture of depomedrol 80 mg  and 0.25%  Marcaine 1.5 cc was injected through the needle. Then a  lesion was done at 80 degrees centigrade for 90 seconds. EBL-0  For L5 median branch block the junction of the ala of  the sacrum with the superior articular process of the facet joint was taken as a reference point. For the L4 median branch the junction of the transverse process of L5 with the superolateral possible facet joint was taken as a reference point and for S1 median branch the most lateral and superior aspect of S1 foramina was taken as a reference point,.   For L3 median branch the junction of L4 transverse process and superior articular process of facet joint was taken as reference point and so on. Patient's vital signs and neurological status remained stable throughout the procedure and post procedural period. The patient tolerated the procedure well and was discharged home in stable condition.     Electronically signed by Wiliam Wang MD on 6/8/21 at 2:04 PM EDT

## 2021-06-08 NOTE — ANESTHESIA POSTPROCEDURE EVALUATION
Department of Anesthesiology  Postprocedure Note    Patient: Crystal Thompson  MRN: [id number]  YOB: 1960  Date of evaluation: 6/8/2021  Time:  2:33 PM     Procedure Summary     Date: 06/08/21 Room / Location: [address] [place] [address] Jesi White    Anesthesia Start: 0831 Anesthesia Stop: 9258    Procedure: L-facet RFA's @ L3-4,L4-5 and L5-S1  Left (Left ) Diagnosis: (Lumbar spondylosis)    Surgeons: Urban Mayorga MD Responsible Provider: Kei Sibley DO    Anesthesia Type: MAC ASA Status: 2          Anesthesia Type: MAC    Kimberlee Phase I:      Kimberlee Phase II: Kimberlee Score: 10    Last vitals: Reviewed and per EMR flowsheets. Anesthesia Post Evaluation    Comments: Jorje Garcia  POST-ANESTHESIA NOTE       Name:  Crystal Thompson                                         Age:  56 y.o. MRN:  [id number]      Last Vitals:  /83   Pulse 83   Temp 98 °F (36.7 °C) (Temporal)   Resp 12   Ht 5' 7\" (1.702 m)   Wt 166 lb 3.2 oz (75.4 kg)   SpO2 100%   BMI 26.03 kg/m²   Patient Vitals in the past 4 hrs:  06/08/21 1417, BP:128/83, Temp:98 °F (36.7 °C), Temp src:Temporal, Pulse:83, Resp:12, SpO2:100 %  06/08/21 1322, BP:(!) 148/81, Temp:96.4 °F (35.8 °C), Temp src:Skin, Pulse:78, Resp:16, SpO2:98 %, Height:5' 7\" (1.702 m), Weight:166 lb 3.2 oz (75.4 kg)    Level of Consciousness:  Awake    Respiratory:  Stable    Oxygen Saturation:  Stable    Cardiovascular:  Stable    Hydration:  Adequate    PONV:  Stable    Post-op Pain:  Adequate analgesia    Post-op Assessment:  No apparent anesthetic complications    Additional Follow-Up / Treatment / Comment:  None    Gorge Thompson  [facility]  June 8, 2021   2:33 PM

## 2021-07-19 ENCOUNTER — OFFICE VISIT (OUTPATIENT)
Dept: PHYSICAL MEDICINE AND REHAB | Age: 61
End: 2021-07-19
Payer: MEDICARE

## 2021-07-19 VITALS
SYSTOLIC BLOOD PRESSURE: 118 MMHG | DIASTOLIC BLOOD PRESSURE: 74 MMHG | HEIGHT: 67 IN | WEIGHT: 166 LBS | BODY MASS INDEX: 26.06 KG/M2

## 2021-07-19 DIAGNOSIS — M47.812 SPONDYLOSIS OF CERVICAL REGION WITHOUT MYELOPATHY OR RADICULOPATHY: ICD-10-CM

## 2021-07-19 DIAGNOSIS — M47.816 LUMBAR SPONDYLOSIS: Primary | ICD-10-CM

## 2021-07-19 DIAGNOSIS — G89.4 CHRONIC PAIN SYNDROME: ICD-10-CM

## 2021-07-19 PROCEDURE — 4004F PT TOBACCO SCREEN RCVD TLK: CPT | Performed by: NURSE PRACTITIONER

## 2021-07-19 PROCEDURE — 99214 OFFICE O/P EST MOD 30 MIN: CPT | Performed by: NURSE PRACTITIONER

## 2021-07-19 PROCEDURE — 3017F COLORECTAL CA SCREEN DOC REV: CPT | Performed by: NURSE PRACTITIONER

## 2021-07-19 PROCEDURE — G8427 DOCREV CUR MEDS BY ELIG CLIN: HCPCS | Performed by: NURSE PRACTITIONER

## 2021-07-19 PROCEDURE — G8419 CALC BMI OUT NRM PARAM NOF/U: HCPCS | Performed by: NURSE PRACTITIONER

## 2021-07-19 ASSESSMENT — ENCOUNTER SYMPTOMS: BACK PAIN: 1

## 2021-07-19 NOTE — PROGRESS NOTES
[facility] [address]  Dept: [phone]  Dept Fax: [phone]: [phone]    Visit Date: 7/19/2021    Functionality Assessment/Goals Worksheet     On a scale of 0 (Does not Interfere) to 10 (Completely Interferes)     1. Which number describes how during the past week pain has interfered with       the following:  A. General Activity:  7  B. Mood: 5  C. Walking Ability:  10  D. Normal Work (Includes both work outside the home and housework):  8  E. Relations with Other People:   8  F. Sleep:   7  G. Enjoyment of Life:  4    2. Patient Prefers to Take their Pain Medications:     [x]  On a regular basis- Ibuprofen   [x]  Only when necessary    []  Does not take pain medications    3. What are the Patient's Goals/Expectations for Visiting Pain Management? []  Learn about my pain    [x]  Receive Medication   []  Physical Therapy     []  Treat Depression   [x]  Receive Injections    []  Treat Sleep   []  Deal with Anxiety and Stress   []  Treat Opoid Dependence/Addiction   []  Other:      HPI:   Gladys Richards is a 56 y.o. female is here today for    Chief Complaint: Low back pain     HPI   FU from Left L-facet RFA from 6/8/2021. Receiving about 90% relief of pain in left side of low back from left L-facet RFA. Main complaint is pain in right low back radiating down right buttocks- sharp and aching and burning. Takes Ibuprofen regularly for pain   Pain increases with bending, lifting, twisting , walking, standing and getting up and down. Medications reviewed. Patient denies side effects with medications. Patient states she is taking medications as prescribed. Shedenies receiving pain medications from other sources. She denies any ER visits since last visit. Pain scale with out pain medications or at its worst is 4-10/10.       The patientis allergic to nsaids, pcn [penicillins], and medrol [methylprednisolone]. Subjective:      Review of Systems   Constitutional: Negative. Musculoskeletal: Positive for arthralgias, back pain, gait problem, myalgias, neck pain and neck stiffness. Neurological: Positive for weakness and numbness. Negative for headaches. Psychiatric/Behavioral: Positive for sleep disturbance. Objective:     Vitals:    07/19/21 0748   BP: 118/74   Site: Left Upper Arm   Position: Sitting   Weight: 166 lb (75.3 kg)   Height: 5' 7\" (1.702 m)       Physical Exam  Vitals and nursing note reviewed. Constitutional:       General: She is not in acute distress. Appearance: She is well-developed. She is not diaphoretic. HENT:      Head: Normocephalic and atraumatic. Right Ear: External ear normal.      Left Ear: External ear normal.      Nose: Nose normal.      Mouth/Throat:      Pharynx: No oropharyngeal exudate. Eyes:      General: No scleral icterus. Right eye: No discharge. Left eye: No discharge. Conjunctiva/sclera: Conjunctivae normal.      Pupils: Pupils are equal, round, and reactive to light. Neck:      Thyroid: No thyromegaly. Cardiovascular:      Rate and Rhythm: Normal rate and regular rhythm. Heart sounds: Normal heart sounds. No murmur heard. No friction rub. No gallop. Pulmonary:      Effort: Pulmonary effort is normal. No respiratory distress. Breath sounds: Normal breath sounds. No wheezing or rales. Chest:      Chest wall: No tenderness. Abdominal:      General: Bowel sounds are normal. There is no distension. Palpations: Abdomen is soft. Tenderness: There is no abdominal tenderness. There is no guarding or rebound. Musculoskeletal:         General: Tenderness present. Cervical back: Neck supple. Spasms and tenderness present. No edema, erythema or rigidity. No muscular tenderness. Decreased range of motion. Thoracic back: Tenderness present. Lumbar back: Tenderness present. Decreased range of motion. Back:    Skin:     General: Skin is warm. Coloration: Skin is not pale. Findings: No erythema or rash. Neurological:      Mental Status: She is alert and oriented to person, place, and time. She is not disoriented. Cranial Nerves: No cranial nerve deficit. Motor: Weakness present. No atrophy or abnormal muscle tone. Coordination: Coordination normal.      Gait: Gait normal.      Deep Tendon Reflexes: Reflexes are normal and symmetric. Babinski sign absent on the right side. Babinski sign absent on the left side. Psychiatric:         Attention and Perception: Attention normal. She is attentive. Mood and Affect: Mood normal. Mood is not anxious or depressed. Affect is not labile, blunt, angry or inappropriate. Speech: Speech normal. She is communicative. Speech is not rapid and pressured, delayed, slurred or tangential.         Behavior: Behavior normal. Behavior is not agitated, slowed, aggressive, withdrawn, hyperactive or combative. Behavior is cooperative. Thought Content: Thought content normal. Thought content is not paranoid or delusional. Thought content does not include homicidal or suicidal ideation. Thought content does not include homicidal or suicidal plan. Cognition and Memory: Cognition normal. Memory is not impaired. She does not exhibit impaired recent memory or impaired remote memory. Judgment: Judgment normal. Judgment is not impulsive or inappropriate. BETHANY test: +   Yeomans test: +   Gaenslen test: +      Assessment:     1. Lumbar spondylosis    2. Spondylosis of cervical region without myelopathy or radiculopathy    3. Chronic pain syndrome            Plan:      · OARRS reviewed. Current MED: 0  · Patient was not offered naloxone for home. · Discussed long term side effects of medications, tolerance, dependency and addiction.   · Previous UDS

## 2021-07-27 ENCOUNTER — APPOINTMENT (OUTPATIENT)
Dept: GENERAL RADIOLOGY | Age: 61
End: 2021-07-27
Attending: PAIN MEDICINE
Payer: MEDICARE

## 2021-07-27 ENCOUNTER — ANESTHESIA EVENT (OUTPATIENT)
Dept: OPERATING ROOM | Age: 61
End: 2021-07-27
Payer: MEDICARE

## 2021-07-27 ENCOUNTER — HOSPITAL ENCOUNTER (OUTPATIENT)
Age: 61
Setting detail: OUTPATIENT SURGERY
Discharge: HOME | End: 2021-07-27
Attending: PAIN MEDICINE | Admitting: PAIN MEDICINE
Payer: MEDICARE

## 2021-07-27 ENCOUNTER — ANESTHESIA (OUTPATIENT)
Dept: OPERATING ROOM | Age: 61
End: 2021-07-27
Payer: MEDICARE

## 2021-07-27 VITALS
DIASTOLIC BLOOD PRESSURE: 57 MMHG | SYSTOLIC BLOOD PRESSURE: 89 MMHG | TEMPERATURE: 98 F | RESPIRATION RATE: 12 BRPM | OXYGEN SATURATION: 99 %

## 2021-07-27 VITALS
OXYGEN SATURATION: 98 % | TEMPERATURE: 97.8 F | HEIGHT: 66 IN | DIASTOLIC BLOOD PRESSURE: 67 MMHG | SYSTOLIC BLOOD PRESSURE: 101 MMHG | WEIGHT: 161 LBS | BODY MASS INDEX: 25.88 KG/M2 | RESPIRATION RATE: 12 BRPM | HEART RATE: 95 BPM

## 2021-07-27 PROCEDURE — 2500000003 HC RX 250 WO HCPCS: Performed by: PAIN MEDICINE

## 2021-07-27 PROCEDURE — 7100000011 HC PHASE II RECOVERY - ADDTL 15 MIN: Performed by: PAIN MEDICINE

## 2021-07-27 PROCEDURE — 3600000056 HC PAIN LEVEL 4 BASE: Performed by: PAIN MEDICINE

## 2021-07-27 PROCEDURE — 3209999900 FLUORO FOR SURGICAL PROCEDURES

## 2021-07-27 PROCEDURE — 6360000002 HC RX W HCPCS: Performed by: NURSE ANESTHETIST, CERTIFIED REGISTERED

## 2021-07-27 PROCEDURE — 3700000000 HC ANESTHESIA ATTENDED CARE: Performed by: PAIN MEDICINE

## 2021-07-27 PROCEDURE — 6360000002 HC RX W HCPCS: Performed by: PAIN MEDICINE

## 2021-07-27 PROCEDURE — 64635 DESTROY LUMB/SAC FACET JNT: CPT | Performed by: PAIN MEDICINE

## 2021-07-27 PROCEDURE — 2709999900 HC NON-CHARGEABLE SUPPLY: Performed by: PAIN MEDICINE

## 2021-07-27 PROCEDURE — 7100000010 HC PHASE II RECOVERY - FIRST 15 MIN: Performed by: PAIN MEDICINE

## 2021-07-27 PROCEDURE — 64636 DESTROY L/S FACET JNT ADDL: CPT | Performed by: PAIN MEDICINE

## 2021-07-27 RX ORDER — LIDOCAINE HYDROCHLORIDE 10 MG/ML
INJECTION, SOLUTION EPIDURAL; INFILTRATION; INTRACAUDAL; PERINEURAL PRN
Status: DISCONTINUED | OUTPATIENT
Start: 2021-07-27 | End: 2021-07-27 | Stop reason: ALTCHOICE

## 2021-07-27 RX ORDER — BUPIVACAINE HYDROCHLORIDE 2.5 MG/ML
INJECTION, SOLUTION EPIDURAL; INFILTRATION; INTRACAUDAL PRN
Status: DISCONTINUED | OUTPATIENT
Start: 2021-07-27 | End: 2021-07-27 | Stop reason: ALTCHOICE

## 2021-07-27 RX ORDER — PROPOFOL 10 MG/ML
INJECTION, EMULSION INTRAVENOUS PRN
Status: DISCONTINUED | OUTPATIENT
Start: 2021-07-27 | End: 2021-07-27 | Stop reason: SDUPTHER

## 2021-07-27 RX ORDER — FENTANYL CITRATE 50 UG/ML
INJECTION, SOLUTION INTRAMUSCULAR; INTRAVENOUS PRN
Status: DISCONTINUED | OUTPATIENT
Start: 2021-07-27 | End: 2021-07-27 | Stop reason: SDUPTHER

## 2021-07-27 RX ORDER — METHYLPREDNISOLONE ACETATE 80 MG/ML
INJECTION, SUSPENSION INTRA-ARTICULAR; INTRALESIONAL; INTRAMUSCULAR; SOFT TISSUE PRN
Status: DISCONTINUED | OUTPATIENT
Start: 2021-07-27 | End: 2021-07-27 | Stop reason: ALTCHOICE

## 2021-07-27 RX ADMIN — FENTANYL CITRATE 50 MCG: 50 INJECTION, SOLUTION INTRAMUSCULAR; INTRAVENOUS at 12:29

## 2021-07-27 RX ADMIN — PROPOFOL 50 MG: 10 INJECTION, EMULSION INTRAVENOUS at 12:35

## 2021-07-27 RX ADMIN — PROPOFOL 30 MG: 10 INJECTION, EMULSION INTRAVENOUS at 12:37

## 2021-07-27 RX ADMIN — FENTANYL CITRATE 50 MCG: 50 INJECTION, SOLUTION INTRAMUSCULAR; INTRAVENOUS at 12:31

## 2021-07-27 ASSESSMENT — PULMONARY FUNCTION TESTS
PIF_VALUE: 0

## 2021-07-27 ASSESSMENT — PAIN SCALES - GENERAL: PAINLEVEL_OUTOF10: 0

## 2021-07-27 ASSESSMENT — PAIN - FUNCTIONAL ASSESSMENT: PAIN_FUNCTIONAL_ASSESSMENT: 0-10

## 2021-07-27 ASSESSMENT — PAIN DESCRIPTION - DESCRIPTORS: DESCRIPTORS: CONSTANT

## 2021-07-27 NOTE — ANESTHESIA PRE PROCEDURE
Provider, MD   VITAMIN D, CHOLECALCIFEROL, PO Take 1 tablet by mouth daily    Historical Provider, MD       Current medications:    No current outpatient medications on file. No current facility-administered medications for this visit. Allergies: Allergies   Allergen Reactions    Nsaids Other (See Comments)     STOPPED BREATHING    Pcn [Penicillins] Other (See Comments)     DIZZINESS    Medrol [Methylprednisolone] Palpitations       Problem List:    Patient Active Problem List   Diagnosis Code    PVD (peripheral vascular disease) ([facility] [address] I73.9    Lumbar spondylosis M47.816    Cervical spondylosis M47.812       Past Medical History:        Diagnosis Date    Anxiety     Chronic back pain     COPD (chronic obstructive pulmonary disease) ([facility] [address]     Depression     Disc disorder     Emphysema of lung ([facility] [address]     Fibromyalgia     Hyperlipidemia     Hypertension     Osteoarthritis     Osteoporosis        Past Surgical History:        Procedure Laterality Date    FACET JOINT INJECTION Bilateral 5/14/2020    bilat. L3, L4 medial branch and L5 dorsal rami injection performed by Gildardo Moreno MD at [place] [address] Bilateral 6/17/2020    bilateral C5-6-7 medial branch block performed by Gildardo Moreno MD at [address] INJECTION Bilateral 1/20/2021    bilateral C5-6-7 medial branch block performed by Gildardo Moreno MD at [facility] ARTHROSCOPY Bilateral 2019    PAIN MANAGEMENT PROCEDURE Bilateral 7/22/2020    bilateral L3-4 medial branch L5 dorsal rami performed by Gildardo Moreno MD at [place] [address] Right 10/7/2020    RFA, L3-4 medial branch and L5 dorsal rami, right. performed by Gildardo Moreno MD at [place] [address] Left 11/4/2020    RFA, L3-4 medial branch and L5 dorsal rami, left.  performed by Gildardo Moreno MD at [place] [facility]    PAIN MANAGEMENT PROCEDURE Left 6/8/2021    L-facet RFA's @ L3-4,L4-5 and L5-S1  Left performed by Mateusz Gannon MD at [address]       Social History:    Social History     Tobacco Use    Smoking status: Current Every Day Smoker     Packs/day: 1.00     Types: Cigarettes    Smokeless tobacco: Never Used   Substance Use Topics    Alcohol use: Yes     Comment: 2 BEERS A MONTH                                 Ready to quit: Not Answered  Counseling given: Not Answered      Vital Signs (Current): There were no vitals filed for this visit. BP Readings from Last 3 Encounters:   07/27/21 138/84   07/19/21 118/74   06/08/21 128/83       NPO Status:                                                                                 BMI:   Wt Readings from Last 3 Encounters:   07/27/21 161 lb (73 kg)   07/19/21 166 lb (75.3 kg)   06/08/21 166 lb 3.2 oz (75.4 kg)     There is no height or weight on file to calculate BMI.    CBC: No results found for: WBC, RBC, HGB, HCT, MCV, RDW, PLT    CMP: No results found for: NA, K, CL, CO2, BUN, CREATININE, GFRAA, AGRATIO, LABGLOM, GLUCOSE, PROT, CALCIUM, BILITOT, ALKPHOS, AST, ALT    POC Tests: No results for input(s): POCGLU, POCNA, POCK, POCCL, POCBUN, POCHEMO, POCHCT in the last 72 hours.     Coags: No results found for: PROTIME, INR, APTT    HCG (If Applicable): No results found for: PREGTESTUR, PREGSERUM, HCG, HCGQUANT     ABGs: No results found for: PHART, PO2ART, MJC1OOB, IQL2ESL, BEART, T3BBMEOA     Type & Screen (If Applicable):  No results found for: LABABO, LABRH    Drug/Infectious Status (If Applicable):  No results found for: HIV, HEPCAB    COVID-19 Screening (If Applicable):   Lab Results   Component Value Date    COVID19 Not Detected 07/17/2020           Anesthesia Evaluation  Patient summary reviewed  Airway: Mallampati: II  TM distance: >3 FB   Neck ROM: full  Mouth opening: > = 3 FB Dental:          Pulmonary:   (+) COPD:                             Cardiovascular:    (+) hypertension:,                   Neuro/Psych:   (+) neuromuscular disease:, psychiatric history:            GI/Hepatic/Renal:             Endo/Other:                     Abdominal:             Vascular: Other Findings:               Anesthesia Plan      MAC     ASA 2       Induction: intravenous. Anesthetic plan and risks discussed with patient. Plan discussed with CRNA.                   [address] [place] [address],    7/27/2021

## 2021-07-27 NOTE — H&P
H&P    FU from Left L-facet RFA from 6/8/2021. Receiving about 90% relief of pain in left side of low back from left L-facet RFA. Main complaint is pain in right low back radiating down right buttocks- sharp and aching and burning.      Takes Ibuprofen regularly for pain   Pain increases with bending, lifting, twisting , walking, standing and getting up and down.        Medications reviewed. Patient denies side effects with medications. Patient states she is taking medications as prescribed. Shedenies receiving pain medications from other sources. She denies any ER visits since last visit.     Pain scale with out pain medications or at its worst is 4-10/10.        The patientis allergic to nsaids, pcn [penicillins], and medrol [methylprednisolone].       Subjective:      Review of Systems   Constitutional: Negative. Musculoskeletal: Positive for arthralgias, back pain, gait problem, myalgias, neck pain and neck stiffness. Neurological: Positive for weakness and numbness. Negative for headaches. Psychiatric/Behavioral: Positive for sleep disturbance.         Objective:      Vitals       Vitals:     07/19/21 0748   BP: 118/74   Site: Left Upper Arm   Position: Sitting   Weight: 166 lb (75.3 kg)   Height: 5' 7\" (1.702 m)            Physical Exam  Vitals and nursing note reviewed. Constitutional:       General: She is not in acute distress. Appearance: She is well-developed. She is not diaphoretic. HENT:      Head: Normocephalic and atraumatic. Right Ear: External ear normal.      Left Ear: External ear normal.      Nose: Nose normal.      Mouth/Throat:      Pharynx: No oropharyngeal exudate. Eyes:      General: No scleral icterus. Right eye: No discharge. Left eye: No discharge. Conjunctiva/sclera: Conjunctivae normal.      Pupils: Pupils are equal, round, and reactive to light. Neck:      Thyroid: No thyromegaly.    Cardiovascular:      Rate and Rhythm: Normal rate and regular rhythm. Heart sounds: Normal heart sounds. No murmur heard. No friction rub. No gallop. Pulmonary:      Effort: Pulmonary effort is normal. No respiratory distress. Breath sounds: Normal breath sounds. No wheezing or rales. Chest:      Chest wall: No tenderness. Abdominal:      General: Bowel sounds are normal. There is no distension. Palpations: Abdomen is soft. Tenderness: There is no abdominal tenderness. There is no guarding or rebound. Musculoskeletal:         General: Tenderness present. Cervical back: Neck supple. Spasms and tenderness present. No edema, erythema or rigidity. No muscular tenderness. Decreased range of motion. Thoracic back: Tenderness present. Lumbar back: Tenderness present. Decreased range of motion. Back:    Skin:     General: Skin is warm. Coloration: Skin is not pale. Findings: No erythema or rash. Neurological:      Mental Status: She is alert and oriented to person, place, and time. She is not disoriented. Cranial Nerves: No cranial nerve deficit. Motor: Weakness present. No atrophy or abnormal muscle tone. Coordination: Coordination normal.      Gait: Gait normal.      Deep Tendon Reflexes: Reflexes are normal and symmetric. Babinski sign absent on the right side. Babinski sign absent on the left side. Psychiatric:         Attention and Perception: Attention normal. She is attentive. Mood and Affect: Mood normal. Mood is not anxious or depressed. Affect is not labile, blunt, angry or inappropriate. Speech: Speech normal. She is communicative. Speech is not rapid and pressured, delayed, slurred or tangential.         Behavior: Behavior normal. Behavior is not agitated, slowed, aggressive, withdrawn, hyperactive or combative. Behavior is cooperative. Thought Content:  Thought content normal. Thought content is not paranoid or delusional. Thought content does not include homicidal or suicidal ideation. Thought content does not include homicidal or suicidal plan. Cognition and Memory: Cognition normal. Memory is not impaired. She does not exhibit impaired recent memory or impaired remote memory. Judgment: Judgment normal. Judgment is not impulsive or inappropriate.         BETHANY test: +   Yeomans test: +   Gaenslen test: +   Assessment:      1. Lumbar spondylosis    2. Spondylosis of cervical region without myelopathy or radiculopathy    3. Chronic pain syndrome       Plan:      · OARRS reviewed. Current MED: 0  · Patient was not offered naloxone for home. · Discussed long term side effects of medications, tolerance, dependency and addiction. · Previous UDS reviewed  · UDS preformed today for compliance. · Patient told can not receive any pain medications from any other source. · No evidence of abuse, diversion or aberrant behavior. · Medications and/or procedures to improve function and quality of life- patient understanding with this and that may not be pain free  · Discussed with patient about safe storage of medications at home  · Discussed possible weaning of medication dosing dependent on treatment/procedure results. · Discussed with patient about risks with procedure including infection, reaction to medication, increased pain, or bleeding. · Procedure notes reveiwed in detail   · Receiving over 90% relief of left low back pain from left L-facet RFA. Main complaint is right low back pain at this time. · Plan right L-facet RFA @ L3-4, L4-5, and L5-S1 for longer term pain relief. Procedure and risks discussed in detail with patient.    · If patient is on blood thinners will need approval to hold: baby aspirin and Ibuprofen   · Discussed moving  to neck when low back pain is improved.            Return for right L-facet RFA @ L3-4, L4-5, and L5-S1. , follow up after procedure.

## 2021-07-27 NOTE — PROGRESS NOTES
1241-  Patient arrived to phase II via cart. Spontaneous respiraitons even and unlabored. Placed on monitor--VSS. Report received from [facility]. 1242-  Assessment completed. Patient is alert and oriented x4. IV capped off-- no complications. Patient denies pain--will monitor. Injection sites clean and dry. Aris Booth given to patient. Family called. 1250-  Discharge instructions given in pre-op, no further questions. All belongings in room. 1255-  IV removed-- no complications. Bandage applied. 1258-  Patient discharged in stable condition with all belongings. Fabricio Camargo RN walked patient to car.

## 2021-07-27 NOTE — H&P
[address] [place] [address]  History and Physical Update    Pt Name: Mal Damon  MRN: [id number]  YOB: 1960  Date of evaluation: 7/27/2021      I have examined the patient and reviewed the H&P/Consult and there are no changes to the patient or plans.         Electronically signed by Amish Avendaño MD on 7/27/2021 at 11:46 AM

## 2021-07-27 NOTE — OP NOTE
Operative Note    Pre-Procedure Note    Patient Name: Jaycee Opitz   YOB: 1960  Medical Record Number: [id number]  Date: 7/27/21    Indication:  Lower back pain  Consent: On file. Vital Signs:   Vitals:    07/27/21 1142   BP: 138/84   Pulse: 90   Resp: 16   Temp: 96.9 °F (36.1 °C)   SpO2: 97%       Past Medical History:   has a past medical history of Anxiety, Chronic back pain, COPD (chronic obstructive pulmonary disease) ([facility] [address] Depression, Disc disorder, Emphysema of lung ([facility] [address] Fibromyalgia, Hyperlipidemia, Hypertension, Osteoarthritis, and Osteoporosis. Past Surgical History:   has a past surgical history that includes Tonsillectomy (1972); Knee arthroscopy (Bilateral, 2019); Facet joint injection (Bilateral, 5/14/2020); Facet joint injection (Bilateral, 6/17/2020); Pain management procedure (Bilateral, 7/22/2020); Pain management procedure (Right, 10/7/2020); Pain management procedure (Left, 11/4/2020); Facet joint injection (Bilateral, 1/20/2021); and Pain management procedure (Left, 6/8/2021). Pre-Sedation Documentation and Exam:   Vital signs have been reviewed (see flow sheet for vitals). Sedation/ Anesthesia Plan:   MAC    Patient is an appropriate candidate for plan of sedation: yes           Preoperative Diagnosis:  L-spondylosis     Post-Op Dx: as above     Procedure Performed:  :Radiofrequency ablation of median branches at the levels of L3-4,L4-5 and L5-S1 right under fluoroscopic guidance      Indication for the Procedure: The patient has ahistory of chronic low back pain that is not responding well to the conservative treatment. Patient's pain is mostly axial in nature. Pain is interfering with the activities of daily living. Physical examination revealed facet tenderness and facet loading is positive.   Patient had undergone lumbar facet joint injections with pain relief that lasted for only a short period of time and had greater than 70% pain relief with point,. For L3 median branch the junction of L4 transverse process and superior articular process of facet joint was taken as reference point and so on.     Patient's vital signs and neurological status remained stable throughout the procedure and post procedural period.   The patient tolerated the procedure well and was discharged home in stable condition        Electronically signed by Lisette Lacy MD on 7/27/21 at 12:29 PM EDT

## 2021-07-27 NOTE — ANESTHESIA POSTPROCEDURE EVALUATION
Department of Anesthesiology  Postprocedure Note    Patient: Bia Lindquist  MRN: [id number]  YOB: 1960  Date of evaluation: 7/27/2021  Time:  12:49 PM     Procedure Summary     Date: 07/27/21 Room / Location: [address] [place] [address] 03 / [address] [facility]    Anesthesia Start: 0162 Anesthesia Stop: 5671    Procedure: right L-facet RFA @ L3-4, L4-5, and L5-S1 (Right Back) Diagnosis: (Lumbar spondylosis)    Surgeons: Servando Marie MD Responsible Provider: Jhon Ramires DO    Anesthesia Type: MAC ASA Status: 2          Anesthesia Type: MAC    Kimberlee Phase I:      Kimberlee Phase II: Kimberlee Score: 10    Last vitals: Reviewed and per EMR flowsheets.        Anesthesia Post Evaluation    Patient location during evaluation: bedside  Patient participation: complete - patient participated  Level of consciousness: awake  Airway patency: patent  Nausea & Vomiting: no nausea  Complications: no  Cardiovascular status: hemodynamically stable  Respiratory status: acceptable  Hydration status: stable

## 2021-09-02 ENCOUNTER — TELEPHONE (OUTPATIENT)
Dept: PHYSICAL MEDICINE AND REHAB | Age: 61
End: 2021-09-02

## 2021-09-02 ENCOUNTER — OFFICE VISIT (OUTPATIENT)
Dept: PHYSICAL MEDICINE AND REHAB | Age: 61
End: 2021-09-02
Payer: MEDICARE

## 2021-09-02 VITALS
WEIGHT: 161 LBS | DIASTOLIC BLOOD PRESSURE: 62 MMHG | HEIGHT: 66 IN | SYSTOLIC BLOOD PRESSURE: 100 MMHG | BODY MASS INDEX: 25.88 KG/M2

## 2021-09-02 DIAGNOSIS — M47.816 LUMBAR SPONDYLOSIS: ICD-10-CM

## 2021-09-02 DIAGNOSIS — M47.812 SPONDYLOSIS OF CERVICAL REGION WITHOUT MYELOPATHY OR RADICULOPATHY: Primary | ICD-10-CM

## 2021-09-02 DIAGNOSIS — M54.2 NECK PAIN: ICD-10-CM

## 2021-09-02 DIAGNOSIS — G89.4 CHRONIC PAIN SYNDROME: ICD-10-CM

## 2021-09-02 PROCEDURE — 99214 OFFICE O/P EST MOD 30 MIN: CPT | Performed by: NURSE PRACTITIONER

## 2021-09-02 PROCEDURE — 3017F COLORECTAL CA SCREEN DOC REV: CPT | Performed by: NURSE PRACTITIONER

## 2021-09-02 PROCEDURE — G8427 DOCREV CUR MEDS BY ELIG CLIN: HCPCS | Performed by: NURSE PRACTITIONER

## 2021-09-02 PROCEDURE — 4004F PT TOBACCO SCREEN RCVD TLK: CPT | Performed by: NURSE PRACTITIONER

## 2021-09-02 PROCEDURE — G8419 CALC BMI OUT NRM PARAM NOF/U: HCPCS | Performed by: NURSE PRACTITIONER

## 2021-09-02 ASSESSMENT — ENCOUNTER SYMPTOMS: BACK PAIN: 1

## 2021-09-02 NOTE — PROGRESS NOTES
[address] [facility] [address]  Dept: [phone]  Dept Fax: [phone]: [phone]    Visit Date: 9/2/2021    Functionality Assessment/Goals Worksheet     On a scale of 0 (Does not Interfere) to 10 (Completely Interferes)     1. Which number describes how during the past week pain has interfered with       the following:  A. General Activity:  8  B. Mood: 3  C. Walking Ability:  5  D. Normal Work (Includes both work outside the home and housework):  9  E. Relations with Other People:   8  F. Sleep:   4  G. Enjoyment of Life:   4    2. Patient Prefers to Take their Pain Medications:     [x]  On a regular basis   []  Only when necessary    []  Does not take pain medications    3. What are the Patient's Goals/Expectations for Visiting Pain Management? []  Learn about my pain    [x]  Receive Medication   []  Physical Therapy     []  Treat Depression   [x]  Receive Injections    []  Treat Sleep   []  Deal with Anxiety and Stress   []  Treat Opoid Dependence/Addiction   []  Other:      HPI:   Rachel Barcenas is a 59 y.o. female is here today for    Chief Complaint: Low back pain, neck pain     HPI   FU from right L-facet RFA from 7/27/2021. Receiving about 85% relief of low back pain and also helped numbness in legs from L-facet RFA. Continues to receive that relief. Pain is tolerable at this time in low back . Mobility has improved since procedure. Sleeping better. Now main complaint is posterior neck pain radiating across shoulder blades - aching stabbing effects from previous C-facet MBB # 1 from C5-C7 has worn off     Takes Ibuprofen prn       Medications reviewed. Patient denies side effects with medications. Patient states she is taking medications as prescribed. Shedenies receiving pain medications from other sources.  She denies any ER visits since last visit. Pain scale with out pain medications or at its worst is 3-4/10. The patientis allergic to nsaids, pcn [penicillins], and medrol [methylprednisolone]. Subjective:      Review of Systems   Constitutional: Negative. Musculoskeletal: Positive for arthralgias, back pain, gait problem, myalgias, neck pain and neck stiffness. Neurological: Positive for weakness and numbness. Negative for headaches. Psychiatric/Behavioral: Negative for sleep disturbance. Objective:     Vitals:    09/02/21 0906   BP: 100/62   Weight: 161 lb (73 kg)   Height: 5' 6\" (1.676 m)       Physical Exam  Vitals and nursing note reviewed. Constitutional:       General: She is not in acute distress. Appearance: She is well-developed. She is not diaphoretic. HENT:      Head: Normocephalic and atraumatic. Right Ear: External ear normal.      Left Ear: External ear normal.      Nose: Nose normal.      Mouth/Throat:      Pharynx: No oropharyngeal exudate. Eyes:      General: No scleral icterus. Right eye: No discharge. Left eye: No discharge. Conjunctiva/sclera: Conjunctivae normal.      Pupils: Pupils are equal, round, and reactive to light. Neck:      Thyroid: No thyromegaly. Cardiovascular:      Rate and Rhythm: Normal rate and regular rhythm. Heart sounds: Normal heart sounds. No murmur heard. No friction rub. No gallop. Pulmonary:      Effort: Pulmonary effort is normal. No respiratory distress. Breath sounds: Normal breath sounds. No wheezing or rales. Chest:      Chest wall: No tenderness. Abdominal:      General: Bowel sounds are normal. There is no distension. Palpations: Abdomen is soft. Tenderness: There is no abdominal tenderness. There is no guarding or rebound. Musculoskeletal:         General: Tenderness present. Cervical back: Neck supple. Spasms and tenderness present. No edema, erythema or rigidity. No muscular tenderness.  Decreased range of motion. Thoracic back: Tenderness present. Lumbar back: Tenderness present. Decreased range of motion. Back:    Skin:     General: Skin is warm. Coloration: Skin is not pale. Findings: No erythema or rash. Neurological:      Mental Status: She is alert and oriented to person, place, and time. She is not disoriented. Cranial Nerves: No cranial nerve deficit. Sensory: Sensory deficit present. Motor: No weakness, atrophy or abnormal muscle tone. Coordination: Coordination normal.      Gait: Gait normal.      Deep Tendon Reflexes: Reflexes are normal and symmetric. Babinski sign absent on the right side. Babinski sign absent on the left side. Psychiatric:         Attention and Perception: Attention normal. She is attentive. Mood and Affect: Mood normal. Mood is not anxious or depressed. Affect is not labile, blunt, angry or inappropriate. Speech: Speech normal. She is communicative. Speech is not rapid and pressured, delayed, slurred or tangential.         Behavior: Behavior normal. Behavior is not agitated, slowed, aggressive, withdrawn, hyperactive or combative. Behavior is cooperative. Thought Content: Thought content normal. Thought content is not paranoid or delusional. Thought content does not include homicidal or suicidal ideation. Thought content does not include homicidal or suicidal plan. Cognition and Memory: Cognition normal. Memory is not impaired. She does not exhibit impaired recent memory or impaired remote memory. Judgment: Judgment normal. Judgment is not impulsive or inappropriate. BETHANY test: +   Yeomans test: +   Gaenslen test: +      Assessment:     1. Spondylosis of cervical region without myelopathy or radiculopathy    2. Neck pain    3. Lumbar spondylosis    4. Chronic pain syndrome            Plan:      · OARRS reviewed. Current MED: 0  · Patient was not offered naloxone for home. · Discussed long term side effects of medications, tolerance, dependency and addiction. · Previous UDS reviewed  · UDS preformed today for compliance. · Patient told can not receive any pain medications from any other source. · No evidence of abuse, diversion or aberrant behavior.  Medications and/or procedures to improve function and quality of life- patient understanding with this and that may not be pain free   Discussed with patient about safe storage of medications at home   Discussed possible weaning of medication dosing dependent on treatment/procedure results.  Discussed with patient about risks with procedure including infection, reaction to medication, increased pain, or bleeding.  Procedure notes reveiwed in detail   Receiving 85% relief of pain across low back from bilateral L-facet RFA and continues to receive that relieve.   Neck pain now main complaint as C-facet MBB # 1 has worn off    Plan bilateral C-facet MBB # 2 @ C5-6, and C6-7 for diagnostic purpose. Procedure and risks discussed in detail with patient. · If patient is on blood thinners will need approval to hold: baby aspirin and Ibuprofen   · Not needing any pain medications       Meds. Prescribed:   No orders of the defined types were placed in this encounter. Return for bilateral C-facet MBB # 2 @ C5-6, and C6-7. , follow up after procedure.                Electronically signed by JAIME Bettencourt CNP on9/2/2021 at 9:42 AM

## 2021-09-17 ENCOUNTER — TELEPHONE (OUTPATIENT)
Dept: PHYSICAL MEDICINE AND REHAB | Age: 61
End: 2021-09-17

## 2021-09-17 NOTE — TELEPHONE ENCOUNTER
LVM for pt. to call office regarding procedure cancellation due to provider on a continued Leave of absence.

## 2021-11-30 ENCOUNTER — OFFICE VISIT (OUTPATIENT)
Dept: PHYSICAL MEDICINE AND REHAB | Age: 61
End: 2021-11-30
Payer: MEDICARE

## 2021-11-30 ENCOUNTER — TELEPHONE (OUTPATIENT)
Dept: PHYSICAL MEDICINE AND REHAB | Age: 61
End: 2021-11-30

## 2021-11-30 VITALS
SYSTOLIC BLOOD PRESSURE: 130 MMHG | HEIGHT: 66 IN | BODY MASS INDEX: 26.52 KG/M2 | HEART RATE: 68 BPM | WEIGHT: 165 LBS | DIASTOLIC BLOOD PRESSURE: 78 MMHG

## 2021-11-30 DIAGNOSIS — M47.816 LUMBAR SPONDYLOSIS: ICD-10-CM

## 2021-11-30 DIAGNOSIS — M47.812 SPONDYLOSIS OF CERVICAL REGION WITHOUT MYELOPATHY OR RADICULOPATHY: Primary | ICD-10-CM

## 2021-11-30 DIAGNOSIS — G89.4 CHRONIC PAIN SYNDROME: ICD-10-CM

## 2021-11-30 DIAGNOSIS — M54.2 NECK PAIN: ICD-10-CM

## 2021-11-30 DIAGNOSIS — M54.17 LUMBOSACRAL RADICULITIS: ICD-10-CM

## 2021-11-30 PROCEDURE — 99214 OFFICE O/P EST MOD 30 MIN: CPT | Performed by: NURSE PRACTITIONER

## 2021-11-30 PROCEDURE — 4004F PT TOBACCO SCREEN RCVD TLK: CPT | Performed by: NURSE PRACTITIONER

## 2021-11-30 PROCEDURE — 3017F COLORECTAL CA SCREEN DOC REV: CPT | Performed by: NURSE PRACTITIONER

## 2021-11-30 PROCEDURE — G8484 FLU IMMUNIZE NO ADMIN: HCPCS | Performed by: NURSE PRACTITIONER

## 2021-11-30 PROCEDURE — G8419 CALC BMI OUT NRM PARAM NOF/U: HCPCS | Performed by: NURSE PRACTITIONER

## 2021-11-30 PROCEDURE — G8427 DOCREV CUR MEDS BY ELIG CLIN: HCPCS | Performed by: NURSE PRACTITIONER

## 2021-11-30 ASSESSMENT — ENCOUNTER SYMPTOMS
BACK PAIN: 1
RESPIRATORY NEGATIVE: 1

## 2021-11-30 NOTE — PROGRESS NOTES
best is 7/10. Only Ibuprofen 800 mg TID from PCP. The patientis allergic to nsaids, pcn [penicillins], and medrol [methylprednisolone]. Subjective:      Review of Systems   Constitutional: Negative. Respiratory: Negative. Cardiovascular: Negative. Musculoskeletal: Positive for arthralgias, back pain, gait problem, myalgias, neck pain and neck stiffness. Neurological: Positive for weakness and numbness. Negative for headaches. Psychiatric/Behavioral: Negative for sleep disturbance. Objective:     Vitals:    11/30/21 0845   BP: 130/78   Site: Left Upper Arm   Position: Sitting   Cuff Size: Medium Adult   Pulse: 68   Weight: 165 lb (74.8 kg)   Height: 5' 6\" (1.676 m)       Physical Exam  Vitals and nursing note reviewed. Constitutional:       General: She is not in acute distress. Appearance: She is well-developed. She is not diaphoretic. HENT:      Head: Normocephalic and atraumatic. Right Ear: External ear normal.      Left Ear: External ear normal.      Nose: Nose normal.      Mouth/Throat:      Pharynx: No oropharyngeal exudate. Eyes:      General: No scleral icterus. Right eye: No discharge. Left eye: No discharge. Conjunctiva/sclera: Conjunctivae normal.      Pupils: Pupils are equal, round, and reactive to light. Neck:      Thyroid: No thyromegaly. Cardiovascular:      Rate and Rhythm: Normal rate and regular rhythm. Heart sounds: Normal heart sounds. No murmur heard. No friction rub. No gallop. Pulmonary:      Effort: Pulmonary effort is normal. No respiratory distress. Breath sounds: Normal breath sounds. No wheezing or rales. Chest:      Chest wall: No tenderness. Abdominal:      General: Bowel sounds are normal. There is no distension. Palpations: Abdomen is soft. Tenderness: There is no abdominal tenderness. There is no guarding or rebound. Musculoskeletal:         General: Tenderness present.       Cervical back: Neck supple. Spasms, tenderness and bony tenderness present. No edema, erythema or rigidity. No muscular tenderness. Decreased range of motion. Thoracic back: Tenderness present. Lumbar back: Tenderness and bony tenderness present. Decreased range of motion. Positive right straight leg raise test. Negative left straight leg raise test.        Back:    Skin:     General: Skin is warm. Coloration: Skin is not pale. Findings: No erythema or rash. Neurological:      Mental Status: She is alert and oriented to person, place, and time. She is not disoriented. Cranial Nerves: No cranial nerve deficit. Sensory: Sensory deficit present. Motor: Weakness present. No atrophy or abnormal muscle tone. Coordination: Coordination normal.      Gait: Gait normal.      Deep Tendon Reflexes: Reflexes are normal and symmetric. Babinski sign absent on the right side. Babinski sign absent on the left side. Psychiatric:         Attention and Perception: Attention normal. She is attentive. Mood and Affect: Mood normal. Mood is not anxious or depressed. Affect is not labile, blunt, angry or inappropriate. Speech: Speech normal. She is communicative. Speech is not rapid and pressured, delayed, slurred or tangential.         Behavior: Behavior normal. Behavior is not agitated, slowed, aggressive, withdrawn, hyperactive or combative. Behavior is cooperative. Thought Content: Thought content normal. Thought content is not paranoid or delusional. Thought content does not include homicidal or suicidal ideation. Thought content does not include homicidal or suicidal plan. Cognition and Memory: Cognition normal. Memory is not impaired. She does not exhibit impaired recent memory or impaired remote memory. Judgment: Judgment normal. Judgment is not impulsive or inappropriate. BETHANY test: +   Yeomans test: +   Gaenslen test: +      Assessment:     1. Spondylosis of cervical region without myelopathy or radiculopathy    2. Neck pain    3. Lumbar spondylosis    4. Lumbosacral radiculitis    5. Chronic pain syndrome            Plan:      · OARRS reviewed. Current MED: 0  · Patient was not offered naloxone for home. · Discussed long term side effects of medications, tolerance, dependency and addiction. · Previous UDS reviewed  · UDS preformed today for compliance. · Patient told can not receive any pain medications from any other source. · No evidence of abuse, diversion or aberrant behavior.  Medications and/or procedures to improve function and quality of life- patient understanding with this and that may not be pain free   Discussed with patient about safe storage of medications at home   Discussed possible weaning of medication dosing dependent on treatment/procedure results.  Testing: Discussed needing new images- updated Lumbar MRI for increased low back pain and radicular pain    Procedures: Plan bilateral C-facet MBB # 2 @ C5-6, and C6-7 for diagnostic purpose. Procedure and risks discussed in detail with patient.  Discussed with patient about risks with procedure including infection, reaction to medication, increased pain, or bleeding.  If patient is on blood thinners will need approval to hold: baby aspirin and Ibuprofen    Ordered aqua therapy       Meds. Prescribed:   No orders of the defined types were placed in this encounter. Return for  bilateral C-facet MBB # 2 @ C5-6, and C6-7 for diagnostic purpose, follow up after procedure.                Electronically signed by JAIME Mcpherson CNP on11/30/2021 at 11:35 AM

## 2021-12-08 ENCOUNTER — HOSPITAL ENCOUNTER (OUTPATIENT)
Dept: PHYSICAL THERAPY | Age: 61
Setting detail: THERAPIES SERIES
Discharge: HOME | End: 2021-12-08
Payer: MEDICARE

## 2021-12-08 PROCEDURE — 97110 THERAPEUTIC EXERCISES: CPT

## 2021-12-08 PROCEDURE — 97161 PT EVAL LOW COMPLEX 20 MIN: CPT
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
## 2021-12-15 ENCOUNTER — PREP FOR PROCEDURE (OUTPATIENT)
Dept: PHYSICAL MEDICINE AND REHAB | Age: 61
End: 2021-12-15

## 2021-12-16 ENCOUNTER — HOSPITAL ENCOUNTER (OUTPATIENT)
Dept: PHYSICAL THERAPY | Age: 61
Setting detail: THERAPIES SERIES
Discharge: HOME | End: 2021-12-16
Payer: MEDICARE

## 2021-12-16 ENCOUNTER — HOSPITAL ENCOUNTER (OUTPATIENT)
Dept: MRI IMAGING | Age: 61
Discharge: HOME | End: 2021-12-16
Payer: MEDICARE

## 2021-12-16 DIAGNOSIS — M47.816 LUMBAR SPONDYLOSIS: ICD-10-CM

## 2021-12-16 DIAGNOSIS — M54.17 LUMBOSACRAL RADICULITIS: ICD-10-CM

## 2021-12-16 PROCEDURE — 97113 AQUATIC THERAPY/EXERCISES: CPT

## 2021-12-16 PROCEDURE — 72148 MRI LUMBAR SPINE W/O DYE: CPT

## 2021-12-16 NOTE — PROGRESS NOTES
[address] [facility]  PHYSICAL THERAPY  [] EVALUATION  [] DAILY NOTE (LAND) [x] DAILY NOTE (AQUATIC ) [] PROGRESS NOTE [] DISCHARGE NOTE    [x] OUTPATIENT REHABILITATION [facility]   [] [place] [address]    [] [facility]   [] [facility]    Date: 2021  Patient Name:  Beto Gay  : 1960  MRN: [id number]  CSN: [id number]    Referring Practitioner JAIME Sánchez*   Diagnosis Spondylosis without myelopathy or radiculopathy, lumbar region [M47.816]  Radiculopathy, lumbosacral region [M54.17]    Treatment Diagnosis Low back pain, BLE paresthesias, Core/Hip weakness, decreased flexibility    Date of Evaluation 21    Additional Pertinent History COPD, Emphysema, HTN, OA, Anxiety, Chronic Back pain , Fibro, Osteoporosis, Lumbar injections/ablations, C5-C7 injection ()       Functional Outcome Measure Used Oswestry    Functional Outcome Score 31/50 62% (21)       Insurance: Primary: Payor: MEDICARE /  /  / ,   Secondary:    Authorization Information: OUTPATIENT BENEFITS:              DEDUCTIBLE:  $[id number]              OUT OF POCKET:  N/A              INSURANCE PAYS AT:   80%              PATIENT RESPONSIBILITY AND/OR CO-PAY:  20%  SECONDARY INSURANCE COMPANY: NA.       PRECERTIFICATION REQUIRED:  No  INSURANCE THERAPY BENEFIT:  Patient has unlimited visits based on medical necessity  Benefit will not cover maintenance or preventative treatment. AQUATIC THERAPY COVERED:   Yes  MODALITIES COVERED:  Yes, with the exception of iontophoresis and hot packs/cold packs   Visit # 2, 2/10 for progress note   Visits Allowed: BMN   Recertification Date: 64   Physician Follow-Up: 22   Physician Orders: Aquatic Therapy    History of Present Illness: Pt notes that when she was 22 she was hit in the head with a metal pole (used for hanging rugs) that fell off the ceiling. Pt notes that this resulted in onset of neck and back pain.  Pt notes that strength). Activity/Treatment Tolerance:  [x]  Patient tolerated treatment well  []  Patient limited by fatigue  []  Patient limited by pain   []  Patient limited by medical complications  []  Other:     Assessment: Initiated aquatic program with patient able to tolerate well. Patient benefits from cues for correct exercise technique to promote optimal strengthening gains. Patient finds positive relief in symptoms following pool session. Body Structures/Functions/Activity Limitations: impaired activity tolerance, impaired balance, impaired endurance, impaired motor control, impaired ROM, impaired sensation, impaired strength, pain and abnormal gait  Prognosis: fair      Goals    Patient Goal: Be able to walk, stand/sit longer, and decrease pain   Short Term Goals: 4 weeks   Pt will report decreased pain levels from 7/10 to 4/10 for improved comfort and activity tolerance. Long Term Goals: 8 weeks   Pt will demo spine AROM WFL pain free to aid in ease with household chores. .   Pt will improve Oswestry score from 31/50 to <= 21/50 to indicate improved function. Pt to improve BLE strength to >= 4+/5 for ease with ambulation . Pt to be compliant and independent with HEP. Patient Education:   [x]  HEP/Education Completed: assess response to initiating pool program    [employer] Access Code: [email or web address]  []  No new Education completed  []  Reviewed Prior HEP      [x]  Patient verbalized and/or demonstrated understanding of education provided. []  Patient unable to verbalize and/or demonstrate understanding of education provided. Will continue education.   []  Barriers to learning:     PLAN:  Treatment Recommendations: Strengthening, Range of Motion, Balance Training, Functional Mobility Training, Endurance Training, Gait Training, Neuromuscular Re-education, Manual Therapy - Soft Tissue Mobilization, Pain Management, Home Exercise Program, Patient Education, Aquatics and Modalities    []  Plan of care initiated. Plan to see patient 2 times per week for 8 weeks to address the treatment planned outlined above.   [x]  Continue with current plan of care  []  Modify plan of care as follows:    []  Hold pending physician visit  []  Discharge    Time In 1420   Time Out 1503   Timed Code Minutes: 43 min   Total Treatment Time: 43 min     Electronically Signed by: Trini Khalil PTA

## 2021-12-17 ENCOUNTER — HOSPITAL ENCOUNTER (OUTPATIENT)
Dept: PHYSICAL THERAPY | Age: 61
Setting detail: THERAPIES SERIES
Discharge: HOME | End: 2021-12-17
Payer: MEDICARE

## 2021-12-17 PROCEDURE — 97113 AQUATIC THERAPY/EXERCISES: CPT

## 2021-12-17 NOTE — PROGRESS NOTES
[address] [facility]  PHYSICAL THERAPY  [] EVALUATION  [] DAILY NOTE (LAND) [x] DAILY NOTE (AQUATIC ) [] PROGRESS NOTE [] DISCHARGE NOTE    [x] OUTPATIENT REHABILITATION CENTER [facility]   [] [place] [address]      [] [facility]   [] [facility]    Date: 2021  Patient Name:  Lamberto Pimentel  : 1960  MRN: [id number]  CSN: [id number]    Referring Practitioner JAIME Wang*   Diagnosis Spondylosis without myelopathy or radiculopathy, lumbar region [M47.816]  Radiculopathy, lumbosacral region [M54.17]    Treatment Diagnosis Low back pain, BLE paresthesias, Core/Hip weakness, decreased flexibility    Date of Evaluation 21    Additional Pertinent History COPD, Emphysema, HTN, OA, Anxiety, Chronic Back pain , Fibro, Osteoporosis, Lumbar injections/ablations, C5-C7 injection ()       Functional Outcome Measure Used Oswestry    Functional Outcome Score 31/50 62% (21)       Insurance: Primary: Payor: MEDICARE /  /  / ,   Secondary:    Authorization Information: OUTPATIENT BENEFITS:              DEDUCTIBLE:  $[id number]              OUT OF POCKET:  N/A              INSURANCE PAYS AT:   80%              PATIENT RESPONSIBILITY AND/OR CO-PAY:  20%  SECONDARY INSURANCE COMPANY: NA.       PRECERTIFICATION REQUIRED:  No  INSURANCE THERAPY BENEFIT:  Patient has unlimited visits based on medical necessity  Benefit will not cover maintenance or preventative treatment. AQUATIC THERAPY COVERED:   Yes  MODALITIES COVERED:  Yes, with the exception of iontophoresis and hot packs/cold packs   Visit # 3, 3/10 for progress note   Visits Allowed: BMN   Recertification Date: 23   Physician Follow-Up: 22   Physician Orders: Aquatic Therapy    History of Present Illness: Pt notes that when she was 22 she was hit in the head with a metal pole (used for hanging rugs) that fell off the ceiling. Pt notes that this resulted in onset of neck and back pain.  Pt notes that pain has been progressing since initiail onset. Pt notes having an lumbar MRI about three years ago, \"Full of arthritis and degeneration and there is nothing they could do to help me\". Pt has a lumbar MRI scheduled for 12/16/2. Pt notes having injections and ablations in the past, most recent was ablation about 4-5 months ago on the right, aiding in relieving RLE symptoms, however continued with right lateral foot/ankle burning. Pt notes that her symptoms are exacerbated with \"everything\", prolonged postioning, walking, lifting, carrying. Pt notes that \"Nothing\" helps take the pain away, temporally with heat/ice. Pt notes that symptoms have remained about the same the last several of months. SUBJECTIVE: Patient reports that she was sore after her appointment yesterday. She states that she is having pain at her lower back today that is radiating down her legs. She reports the pain at her right leg is worse than her left. She also notes pain at her right foot. She states that it is painful to walk today. AQUATICS TREATMENT   Precautions:    Pain: Yes no number given.  Lower back, BLE R>L, right foot     X in shaded column indicates activity completed today   Exercise/Intervention Sets/Sec  Notes   Walk Forward x2  X    Walk Backward x2  X    Walk Sideways x2  X           Lower Extremity Exercises:       Heel/Toe Raises 10x  X    Marches 10x  X    Squats 10x  X    3 Way Hip 10x  X    Hamstring Curls 10x  X    Lunges       Step-Ups              Lower Extremity Stretches:              Seated Exercises:              Upper Extremity Exercises:       Shoulder Flexion       Shoulder ABD/ADD       Shoulder Horizontal ABD/ADD       Shoulder IR/ER       Shoulder Circles       Shoulder Shrugs       Rows       Bicep Curls              Upper Extremity Stretches:              Balance:              Dynamic Gait:           In 4'10\"   Deep Water:       Hang 5 min  X    Bicycle 3 min  X    Hip ABD/ADD 2 min   X Added today    Hip Flex/Ext 2 min   X Added today, Cues for posture      Specific Interventions Next Treatment: Plan to start with aquatic therapy transitioning to land based therapy as activity tolerance permits. (ROM/ LE/Core strength). Activity/Treatment Tolerance:  [x]  Patient tolerated treatment well  []  Patient limited by fatigue  []  Patient limited by pain   []  Patient limited by medical complications  []  Other:     Assessment: Patient completed aquatic therapy exercises as documented above. She reported pain at the bottom of her right foot during ambulation in the pool. She was provided with occasional cues throughout session on proper technique with exercises to ensure maximal muscle activation. Cues also provided for abdominal bracing while she performed exercises. She reported that her legs started to loosen up some while in the pool. Added deep water hip abduction/adduction kicks and flexion/extension kicks. She reported that her pain decreased at the conclusion of session. Body Structures/Functions/Activity Limitations: impaired activity tolerance, impaired balance, impaired endurance, impaired motor control, impaired ROM, impaired sensation, impaired strength, pain and abnormal gait  Prognosis: fair      Goals    Patient Goal: Be able to walk, stand/sit longer, and decrease pain   Short Term Goals: 4 weeks   Pt will report decreased pain levels from 7/10 to 4/10 for improved comfort and activity tolerance. Long Term Goals: 8 weeks   Pt will demo spine AROM WFL pain free to aid in ease with household chores. .   Pt will improve Oswestry score from 31/50 to <= 21/50 to indicate improved function. Pt to improve BLE strength to >= 4+/5 for ease with ambulation . Pt to be compliant and independent with HEP. Patient Education:   [x]  HEP/Education Completed: Correct technique with exercises. Cues for abdominal bracing. Added deep water exercises as documented above.      [employer] Access Code: [email or web address]  []  No new Education completed  [x]  Reviewed Prior HEP      [x]  Patient verbalized and/or demonstrated understanding of education provided. []  Patient unable to verbalize and/or demonstrate understanding of education provided. Will continue education. []  Barriers to learning:     PLAN:  Treatment Recommendations: Strengthening, Range of Motion, Balance Training, Functional Mobility Training, Endurance Training, Gait Training, Neuromuscular Re-education, Manual Therapy - Soft Tissue Mobilization, Pain Management, Home Exercise Program, Patient Education, Aquatics and Modalities    []  Plan of care initiated. Plan to see patient 2 times per week for 8 weeks to address the treatment planned outlined above.   [x]  Continue with current plan of care  []  Modify plan of care as follows:    []  Hold pending physician visit  []  Discharge    Time In 1530   Time Out 1610   Timed Code Minutes:  40 min   Total Treatment Time:  40 min     Electronically Signed by: Brianna Palomares PTA

## 2021-12-21 ENCOUNTER — ANESTHESIA (OUTPATIENT)
Dept: OPERATING ROOM | Age: 61
End: 2021-12-21
Payer: MEDICARE

## 2021-12-21 ENCOUNTER — APPOINTMENT (OUTPATIENT)
Dept: GENERAL RADIOLOGY | Age: 61
End: 2021-12-21
Attending: PAIN MEDICINE
Payer: MEDICARE

## 2021-12-21 ENCOUNTER — HOSPITAL ENCOUNTER (OUTPATIENT)
Age: 61
Setting detail: OUTPATIENT SURGERY
Discharge: HOME | End: 2021-12-21
Attending: PAIN MEDICINE | Admitting: PAIN MEDICINE
Payer: MEDICARE

## 2021-12-21 ENCOUNTER — ANESTHESIA EVENT (OUTPATIENT)
Dept: OPERATING ROOM | Age: 61
End: 2021-12-21
Payer: MEDICARE

## 2021-12-21 VITALS
RESPIRATION RATE: 16 BRPM | OXYGEN SATURATION: 100 % | DIASTOLIC BLOOD PRESSURE: 85 MMHG | SYSTOLIC BLOOD PRESSURE: 143 MMHG

## 2021-12-21 VITALS
HEIGHT: 66 IN | SYSTOLIC BLOOD PRESSURE: 150 MMHG | WEIGHT: 161.6 LBS | DIASTOLIC BLOOD PRESSURE: 79 MMHG | HEART RATE: 76 BPM | RESPIRATION RATE: 16 BRPM | BODY MASS INDEX: 25.97 KG/M2 | TEMPERATURE: 97.8 F | OXYGEN SATURATION: 95 %

## 2021-12-21 PROCEDURE — 3600000054 HC PAIN LEVEL 3 BASE: Performed by: PAIN MEDICINE

## 2021-12-21 PROCEDURE — 7100000011 HC PHASE II RECOVERY - ADDTL 15 MIN: Performed by: PAIN MEDICINE

## 2021-12-21 PROCEDURE — 2500000003 HC RX 250 WO HCPCS: Performed by: PAIN MEDICINE

## 2021-12-21 PROCEDURE — 2580000003 HC RX 258: Performed by: NURSE ANESTHETIST, CERTIFIED REGISTERED

## 2021-12-21 PROCEDURE — 3600000055 HC PAIN LEVEL 3 ADDL 15 MIN: Performed by: PAIN MEDICINE

## 2021-12-21 PROCEDURE — 64490 INJ PARAVERT F JNT C/T 1 LEV: CPT | Performed by: PAIN MEDICINE

## 2021-12-21 PROCEDURE — 6360000002 HC RX W HCPCS: Performed by: NURSE ANESTHETIST, CERTIFIED REGISTERED

## 2021-12-21 PROCEDURE — 3700000000 HC ANESTHESIA ATTENDED CARE: Performed by: PAIN MEDICINE

## 2021-12-21 PROCEDURE — 7100000010 HC PHASE II RECOVERY - FIRST 15 MIN: Performed by: PAIN MEDICINE

## 2021-12-21 PROCEDURE — 6360000002 HC RX W HCPCS: Performed by: PAIN MEDICINE

## 2021-12-21 PROCEDURE — 64491 INJ PARAVERT F JNT C/T 2 LEV: CPT | Performed by: PAIN MEDICINE

## 2021-12-21 PROCEDURE — 3700000001 HC ADD 15 MINUTES (ANESTHESIA): Performed by: PAIN MEDICINE

## 2021-12-21 PROCEDURE — 2709999900 HC NON-CHARGEABLE SUPPLY: Performed by: PAIN MEDICINE

## 2021-12-21 PROCEDURE — 3209999900 FLUORO FOR SURGICAL PROCEDURES

## 2021-12-21 RX ORDER — BUPIVACAINE HYDROCHLORIDE 2.5 MG/ML
INJECTION, SOLUTION EPIDURAL; INFILTRATION; INTRACAUDAL PRN
Status: DISCONTINUED | OUTPATIENT
Start: 2021-12-21 | End: 2021-12-21 | Stop reason: ALTCHOICE

## 2021-12-21 RX ORDER — SODIUM CHLORIDE 9 MG/ML
INJECTION, SOLUTION INTRAVENOUS CONTINUOUS PRN
Status: DISCONTINUED | OUTPATIENT
Start: 2021-12-21 | End: 2021-12-21 | Stop reason: SDUPTHER

## 2021-12-21 RX ORDER — LIDOCAINE HYDROCHLORIDE 10 MG/ML
INJECTION, SOLUTION INFILTRATION; PERINEURAL PRN
Status: DISCONTINUED | OUTPATIENT
Start: 2021-12-21 | End: 2021-12-21 | Stop reason: ALTCHOICE

## 2021-12-21 RX ORDER — DEXAMETHASONE SODIUM PHOSPHATE 4 MG/ML
INJECTION, SOLUTION INTRA-ARTICULAR; INTRALESIONAL; INTRAMUSCULAR; INTRAVENOUS; SOFT TISSUE PRN
Status: DISCONTINUED | OUTPATIENT
Start: 2021-12-21 | End: 2021-12-21 | Stop reason: ALTCHOICE

## 2021-12-21 RX ORDER — FENTANYL CITRATE 50 UG/ML
INJECTION, SOLUTION INTRAMUSCULAR; INTRAVENOUS PRN
Status: DISCONTINUED | OUTPATIENT
Start: 2021-12-21 | End: 2021-12-21 | Stop reason: SDUPTHER

## 2021-12-21 RX ADMIN — FENTANYL CITRATE 100 MCG: 50 INJECTION, SOLUTION INTRAMUSCULAR; INTRAVENOUS at 09:27

## 2021-12-21 RX ADMIN — SODIUM CHLORIDE: 9 INJECTION, SOLUTION INTRAVENOUS at 09:28

## 2021-12-21 ASSESSMENT — PAIN - FUNCTIONAL ASSESSMENT: PAIN_FUNCTIONAL_ASSESSMENT: 0-10

## 2021-12-21 ASSESSMENT — PULMONARY FUNCTION TESTS
PIF_VALUE: 0

## 2021-12-21 ASSESSMENT — PAIN DESCRIPTION - DESCRIPTORS: DESCRIPTORS: ACHING

## 2021-12-21 ASSESSMENT — PAIN SCALES - GENERAL: PAINLEVEL_OUTOF10: 0

## 2021-12-21 NOTE — ANESTHESIA PRE PROCEDURE
RFA's @ L3-4,L4-5 and L5-S1  Left performed by Kathy Mckeon MD at [facility] [address] Right 7/27/2021    right L-facet RFA @ L3-4, L4-5, and L5-S1 performed by Kathy Mckeon MD at [address] [place] [address]       Social History:    Social History     Tobacco Use    Smoking status: Current Every Day Smoker     Packs/day: 1.00     Types: Cigarettes    Smokeless tobacco: Never Used   Substance Use Topics    Alcohol use: Yes     Comment: 2 BEERS A MONTH                                 Ready to quit: Not Answered  Counseling given: Not Answered      Vital Signs (Current): There were no vitals filed for this visit. BP Readings from Last 3 Encounters:   12/21/21 (!) 147/78   11/30/21 130/78   09/02/21 100/62       NPO Status:                                                                                 BMI:   Wt Readings from Last 3 Encounters:   12/21/21 161 lb 9.6 oz (73.3 kg)   11/30/21 165 lb (74.8 kg)   09/02/21 161 lb (73 kg)     There is no height or weight on file to calculate BMI.    CBC: No results found for: WBC, RBC, HGB, HCT, MCV, RDW, PLT    CMP: No results found for: NA, K, CL, CO2, BUN, CREATININE, GFRAA, AGRATIO, LABGLOM, GLUCOSE, PROT, CALCIUM, BILITOT, ALKPHOS, AST, ALT    POC Tests: No results for input(s): POCGLU, POCNA, POCK, POCCL, POCBUN, POCHEMO, POCHCT in the last 72 hours.     Coags: No results found for: PROTIME, INR, APTT    HCG (If Applicable): No results found for: PREGTESTUR, PREGSERUM, HCG, HCGQUANT     ABGs: No results found for: PHART, PO2ART, XCR8BZL, WHF7KSX, BEART, [id number]     Type & Screen (If Applicable):  No results found for: LABABO, LABRH    Drug/Infectious Status (If Applicable):  No results found for: HIV, HEPCAB    COVID-19 Screening (If Applicable):   Lab Results   Component Value Date    COVID19 Not Detected 07/17/2020           Anesthesia Evaluation  Patient summary reviewed no history of anesthetic complications:   Airway: Mallampati: II  TM distance: >3 FB   Neck ROM: full  Mouth opening: > = 3 FB Dental:          Pulmonary:   (+) COPD:  decreased breath sounds,                             Cardiovascular:    (+) hypertension:,                   Neuro/Psych:   (+) neuromuscular disease:, psychiatric history:            GI/Hepatic/Renal:             Endo/Other:                     Abdominal:             Vascular: Other Findings:               Anesthesia Plan      MAC     ASA 2       Induction: intravenous. MIPS: prophylactic pharmacologic antiemetic agents not administered perioperatively for documented reasons. Anesthetic plan and risks discussed with patient.       Plan discussed with CRNA and surgical team.                  Genia Becerra MD   12/21/2021

## 2021-12-21 NOTE — PROGRESS NOTES
4965-  Patient arrived to phase II via cart. Spontaneous respiraitons even and unlabored. Placed on monitor--VSS. Report received from [address] [place] [address]-  Assessment completed. Patient is alert and oriented x4. IV capped off-- no complications. Patient denies pain--will monitor. Injection sites clean and dry. 3900-  [employer] given to patient. Family in room. 6307-  IV removed-- no complications. Bandage applied. 1000-  Patient discharged in stable condition with all belongings. This RN walked patient to car.

## 2021-12-21 NOTE — H&P
H&P    States neck pain has no change. Posterior neck pain radiating across shoulder blades - aching stabbing effects, numbness in shoulders and down into bilateral hands.      Also has c/o low back pain radiates to right leg. Describes as taking a knife to area, constant. numness and tingling in right, down to foot. Last MRI was 2/2020. Feels that this has increased over the past 1 month      Pain increases with lifting, twisting , pushing, pulling, walking, standing, sitting and laying.      She denies any ER visits since last visit.     Pain scale with out pain medications or at its worst is 10/10. Pain scale with pain medications or at its best is 7/10. Only Ibuprofen 800 mg TID from PCP.          The patientis allergic to nsaids, pcn [penicillins], and medrol [methylprednisolone].       Subjective:      Review of Systems   Constitutional: Negative. Respiratory: Negative. Cardiovascular: Negative. Musculoskeletal: Positive for arthralgias, back pain, gait problem, myalgias, neck pain and neck stiffness. Neurological: Positive for weakness and numbness. Negative for headaches. Psychiatric/Behavioral: Negative for sleep disturbance.         Objective:      Vitals       Vitals:     11/30/21 0845   BP: 130/78   Site: Left Upper Arm   Position: Sitting   Cuff Size: Medium Adult   Pulse: 68   Weight: 165 lb (74.8 kg)   Height: 5' 6\" (1.676 m)            Physical Exam  Vitals and nursing note reviewed. Constitutional:       General: She is not in acute distress. Appearance: She is well-developed. She is not diaphoretic. HENT:      Head: Normocephalic and atraumatic. Right Ear: External ear normal.      Left Ear: External ear normal.      Nose: Nose normal.      Mouth/Throat:      Pharynx: No oropharyngeal exudate. Eyes:      General: No scleral icterus. Right eye: No discharge. Left eye: No discharge.       Conjunctiva/sclera: Conjunctivae normal.      Pupils: Pupils are equal, round, and reactive to light. Neck:      Thyroid: No thyromegaly. Cardiovascular:      Rate and Rhythm: Normal rate and regular rhythm. Heart sounds: Normal heart sounds. No murmur heard. No friction rub. No gallop. Pulmonary:      Effort: Pulmonary effort is normal. No respiratory distress. Breath sounds: Normal breath sounds. No wheezing or rales. Chest:      Chest wall: No tenderness. Abdominal:      General: Bowel sounds are normal. There is no distension. Palpations: Abdomen is soft. Tenderness: There is no abdominal tenderness. There is no guarding or rebound. Musculoskeletal:         General: Tenderness present. Cervical back: Neck supple. Spasms, tenderness and bony tenderness present. No edema, erythema or rigidity. No muscular tenderness. Decreased range of motion. Thoracic back: Tenderness present. Lumbar back: Tenderness and bony tenderness present. Decreased range of motion. Positive right straight leg raise test. Negative left straight leg raise test.        Back:    Skin:     General: Skin is warm. Coloration: Skin is not pale. Findings: No erythema or rash. Neurological:      Mental Status: She is alert and oriented to person, place, and time. She is not disoriented. Cranial Nerves: No cranial nerve deficit. Sensory: Sensory deficit present. Motor: Weakness present. No atrophy or abnormal muscle tone. Coordination: Coordination normal.      Gait: Gait normal.      Deep Tendon Reflexes: Reflexes are normal and symmetric. Babinski sign absent on the right side. Babinski sign absent on the left side. Psychiatric:         Attention and Perception: Attention normal. She is attentive. Mood and Affect: Mood normal. Mood is not anxious or depressed. Affect is not labile, blunt, angry or inappropriate. Speech: Speech normal. She is communicative.  Speech is not rapid and pressured, delayed, slurred or tangential.         Behavior: Behavior normal. Behavior is not agitated, slowed, aggressive, withdrawn, hyperactive or combative. Behavior is cooperative. Thought Content: Thought content normal. Thought content is not paranoid or delusional. Thought content does not include homicidal or suicidal ideation. Thought content does not include homicidal or suicidal plan. Cognition and Memory: Cognition normal. Memory is not impaired. She does not exhibit impaired recent memory or impaired remote memory. Judgment: Judgment normal. Judgment is not impulsive or inappropriate.         BETHANY test: +   Yeomans test: +   Gaenslen test: +   Assessment:      1. Spondylosis of cervical region without myelopathy or radiculopathy    2. Neck pain    3. Lumbar spondylosis    4. Lumbosacral radiculitis    5. Chronic pain syndrome       Plan:      · OARRS reviewed. Current MED: 0  · Patient was not offered naloxone for home. · Discussed long term side effects of medications, tolerance, dependency and addiction. · Previous UDS reviewed  · UDS preformed today for compliance. · Patient told can not receive any pain medications from any other source. · No evidence of abuse, diversion or aberrant behavior. · Medications and/or procedures to improve function and quality of life- patient understanding with this and that may not be pain free  · Discussed with patient about safe storage of medications at home  · Discussed possible weaning of medication dosing dependent on treatment/procedure results. · Testing: Discussed needing new images- updated Lumbar MRI for increased low back pain and radicular pain   · Procedures: Plan bilateral C-facet MBB # 2 @ C5-6, and C6-7 for diagnostic purpose. Procedure and risks discussed in detail with patient. · Discussed with patient about risks with procedure including infection, reaction to medication, increased pain, or bleeding.   · If patient is on blood thinners will need approval to hold: baby aspirin and Ibuprofen   · Ordered aqua therapy                 Return for  bilateral C-facet MBB # 2 @ C5-6, and C6-7 for diagnostic purpose, follow up after procedure.

## 2021-12-21 NOTE — ANESTHESIA POSTPROCEDURE EVALUATION
Department of Anesthesiology  Postprocedure Note    Patient: Alvina Win  MRN: [id number]  YOB: 1960  Date of evaluation: 12/21/2021  Time:  10:06 AM     Procedure Summary     Date: 12/21/21 Room / Location: [address] [place] [address] 03 / [address] [facility]    Anesthesia Start: 2043 Anesthesia Stop: 1603    Procedure: bilateral C-facet MBB # 2 @ C5-6, and C6-7 for diagnostic purpose (Bilateral Neck) Diagnosis: (Spondylosis of cervical region without myelopathy or radiculopathy)    Surgeons: Joanie Fields MD Responsible Provider: Rosangela Dominguez MD    Anesthesia Type: MAC ASA Status: 2          Anesthesia Type: MAC    Kimberlee Phase I:      Kimberlee Phase II: Kimberlee Score: 10    Last vitals: Reviewed and per EMR flowsheets. Anesthesia Post Evaluation    Patient location during evaluation: bedside  Patient participation: complete - patient cannot participate  Level of consciousness: awake and alert  Airway patency: patent  Nausea & Vomiting: no nausea and no vomiting  Complications: no  Cardiovascular status: hemodynamically stable  Respiratory status: acceptable  Hydration status: euvolemic      [facility]  POST-ANESTHESIA NOTE       Name:  Alvina Win                                         Age:  59 y.o.   MRN:  [id number]      Last Vitals:  BP (!) 150/79   Pulse 76   Temp 97.8 °F (36.6 °C) (Temporal)   Resp 16   Ht 5' 6\" (1.676 m)   Wt 161 lb 9.6 oz (73.3 kg)   SpO2 95%   BMI 26.08 kg/m²   Patient Vitals for the past 4 hrs:   BP Temp Temp src Pulse Resp SpO2 Height Weight   12/21/21 0939 (!) 150/79 97.8 °F (36.6 °C) Temporal 76 16 95 %     12/21/21 0826 (!) 147/78 96.2 °F (35.7 °C) Temporal 71 16 96 % 5' 6\" (1.676 m) 161 lb 9.6 oz (73.3 kg)       Level of Consciousness:  Awake    Respiratory:  Stable    Oxygen Saturation:  Stable    Cardiovascular:  Stable    Hydration:  Adequate    PONV:  Stable    Post-op Pain:  Adequate analgesia    Post-op Assessment:

## 2021-12-22 ENCOUNTER — HOSPITAL ENCOUNTER (OUTPATIENT)
Dept: PHYSICAL THERAPY | Age: 61
Setting detail: THERAPIES SERIES
End: 2021-12-22
Payer: MEDICARE

## 2021-12-23 ENCOUNTER — HOSPITAL ENCOUNTER (OUTPATIENT)
Dept: PHYSICAL THERAPY | Age: 61
Setting detail: THERAPIES SERIES
End: 2021-12-23
Payer: MEDICARE

## 2021-12-28 ENCOUNTER — HOSPITAL ENCOUNTER (OUTPATIENT)
Dept: PHYSICAL THERAPY | Age: 61
Setting detail: THERAPIES SERIES
Discharge: HOME | End: 2021-12-28
Payer: MEDICARE

## 2021-12-28 PROCEDURE — 97113 AQUATIC THERAPY/EXERCISES: CPT

## 2021-12-28 NOTE — PROGRESS NOTES
[facility]  PHYSICAL THERAPY  [] EVALUATION  [] DAILY NOTE (LAND) [x] DAILY NOTE (AQUATIC ) [] PROGRESS NOTE [] DISCHARGE NOTE    [x] OUTPATIENT REHABILITATION CENTER [facility]   [] [place] [address]      [] [facility]   [] Franny Palomino    Date: 2021  Patient Name:  Wicho Caruso  : 1960  MRN: [id number]  CSN: [id number]    Referring Practitioner JAIME Cano*   Diagnosis Spondylosis without myelopathy or radiculopathy, lumbar region [M47.816]  Radiculopathy, lumbosacral region [M54.17]    Treatment Diagnosis Low back pain, BLE paresthesias, Core/Hip weakness, decreased flexibility    Date of Evaluation 21    Additional Pertinent History COPD, Emphysema, HTN, OA, Anxiety, Chronic Back pain , Fibro, Osteoporosis, Lumbar injections/ablations, C5-C7 injection ()       Functional Outcome Measure Used Oswestry    Functional Outcome Score 31/50 62% (21)       Insurance: Primary: Payor: MEDICARE /  /  / ,   Secondary:    Authorization Information: OUTPATIENT BENEFITS:              DEDUCTIBLE:  $[id number]              OUT OF POCKET:  N/A              INSURANCE PAYS AT:   80%              PATIENT RESPONSIBILITY AND/OR CO-PAY:  20%  SECONDARY INSURANCE COMPANY: NA.       PRECERTIFICATION REQUIRED:  No  INSURANCE THERAPY BENEFIT:  Patient has unlimited visits based on medical necessity  Benefit will not cover maintenance or preventative treatment. AQUATIC THERAPY COVERED:   Yes  MODALITIES COVERED:  Yes, with the exception of iontophoresis and hot packs/cold packs   Visit # 4, 4/10 for progress note   Visits Allowed: BMN   Recertification Date: 43   Physician Follow-Up: 22   Physician Orders: Aquatic Therapy    History of Present Illness: Pt notes that when she was 22 she was hit in the head with a metal pole (used for hanging rugs) that fell off the ceiling. Pt notes that this resulted in onset of neck and back pain.  Pt notes that pain has been progressing since initial onset. Pt notes having an lumbar MRI about three years ago, \"Full of arthritis and degeneration and there is nothing they could do to help me\". Pt has a lumbar MRI scheduled for 12/16/2. Pt notes having injections and ablations in the past, most recent was ablation about 4-5 months ago on the right, aiding in relieving RLE symptoms, however continued with right lateral foot/ankle burning. Pt notes that her symptoms are exacerbated with \"everything\", prolonged postioning, walking, lifting, carrying. Pt notes that \"Nothing\" helps take the pain away, temporally with heat/ice. Pt notes that symptoms have remained about the same the last several of months. SUBJECTIVE: Pt notes back is \"not feeling too bad\" today, noted that she had been \"taking it easy\" so far today. Pt notes that she was \"stiff sore\" for a couple of days after her last PT session. Pt notes that she feels that the aquatic therapy has been helping.      AQUATICS TREATMENT   Precautions:    Pain: 7/10 Lower back, BLE R>L, right foot     X in shaded column indicates activity completed today   Exercise/Intervention Sets/Sec  Notes   Walk Forward x6  X    Walk Backward x2  X    Walk Sideways x2  X           Lower Extremity Exercises:       Heel/Toe Raises 15x  X    Marches 20x  X Cueing to maintain TA activation    Squats 20x  X Cueing for pain free depth    3 Way Hip 12x  X Cueing to avoid excessive lumbar lordosis with hip extension    Hamstring Curls 15x ea  X    Lunges       Step-Ups Forward * 10 ea  X           Lower Extremity Stretches:              Seated Exercises:              Upper Extremity Exercises:       Shoulder Flexion       Shoulder ABD/ADD       Shoulder Horizontal ABD/ADD       Shoulder IR/ER       Shoulder Circles       Shoulder Shrugs       Rows       Bicep Curls              Upper Extremity Stretches:              Balance:              Dynamic Gait:           In 4'10\"   Deep Water: Hang 5 min  X    Bicycle 3 min  X    Hip ABD/ADD 2 min   X Cueing to maintain neutral spin e    Hip Flex/Ext 2 min   X Cueing to maintain neutral spin e      Specific Interventions Next Treatment: Plan to start with aquatic therapy transitioning to land based therapy as activity tolerance permits. (ROM/ LE/Core strength). Activity/Treatment Tolerance:  [x]  Patient tolerated treatment well  []  Patient limited by fatigue  []  Patient limited by pain   []  Patient limited by medical complications  []  Other:     Assessment: Progressed several of recurrent exercises with additonal volume this session with good tolerance. Trialed addition of forward step-ups, Pt demo good control and denying adverse effects. Pt noted decreased pain intensity and improved mobility after completion of today's PT session. Will continue to progress therex within aquatic environment at this time. Body Structures/Functions/Activity Limitations: impaired activity tolerance, impaired balance, impaired endurance, impaired motor control, impaired ROM, impaired sensation, impaired strength, pain and abnormal gait  Prognosis: fair      Goals    Patient Goal: Be able to walk, stand/sit longer, and decrease pain   Short Term Goals: 4 weeks   Pt will report decreased pain levels from 7/10 to 4/10 for improved comfort and activity tolerance. Long Term Goals: 8 weeks   Pt will demo spine AROM WFL pain free to aid in ease with household chores. .   Pt will improve Oswestry score from 31/50 to <= 21/50 to indicate improved function. Pt to improve BLE strength to >= 4+/5 for ease with ambulation . Pt to be compliant and independent with HEP. Patient Education:   [x]  HEP/Education Completed: Body mechanics with completion of aquatic exercises.  [employer] Access Code: [email or web address]  []  No new Education completed  [x]  Reviewed Prior HEP      [x]  Patient verbalized and/or demonstrated understanding of education provided.   [] Patient unable to verbalize and/or demonstrate understanding of education provided. Will continue education. []  Barriers to learning:     PLAN:  Treatment Recommendations: Strengthening, Range of Motion, Balance Training, Functional Mobility Training, Endurance Training, Gait Training, Neuromuscular Re-education, Manual Therapy - Soft Tissue Mobilization, Pain Management, Home Exercise Program, Patient Education, Aquatics and Modalities    []  Plan of care initiated. Plan to see patient 2 times per week for 8 weeks to address the treatment planned outlined above.   [x]  Continue with current plan of care  []  Modify plan of care as follows:    []  Hold pending physician visit  []  Discharge    Time In 1348   Time Out 1430   Timed Code Minutes:  42   Total Treatment Time: 42      Electronically Signed by: Santiago Hightower PT

## 2021-12-30 ENCOUNTER — HOSPITAL ENCOUNTER (OUTPATIENT)
Dept: PHYSICAL THERAPY | Age: 61
Setting detail: THERAPIES SERIES
Discharge: HOME | End: 2021-12-30
Payer: MEDICARE

## 2021-12-30 PROCEDURE — 97113 AQUATIC THERAPY/EXERCISES: CPT

## 2021-12-30 NOTE — PROGRESS NOTES
[address] [facility]  PHYSICAL THERAPY  [] EVALUATION  [] DAILY NOTE (LAND) [x] DAILY NOTE (AQUATIC ) [] PROGRESS NOTE [] DISCHARGE NOTE    [x] OUTPATIENT REHABILITATION CENTER [facility]   [] [place] [address]      [] [facility]   [] Ally Crimes    Date: 2021  Patient Name:  Mary Silver  : 1960  MRN: [id number]  CSN: [id number]    Referring Practitioner JAIME Oneal*   Diagnosis Spondylosis without myelopathy or radiculopathy, lumbar region [M47.816]  Radiculopathy, lumbosacral region [M54.17]    Treatment Diagnosis Low back pain, BLE paresthesias, Core/Hip weakness, decreased flexibility    Date of Evaluation 21    Additional Pertinent History COPD, Emphysema, HTN, OA, Anxiety, Chronic Back pain , Fibro, Osteoporosis, Lumbar injections/ablations, C5-C7 injection ()       Functional Outcome Measure Used Oswestry    Functional Outcome Score 31/50 62% (21)       Insurance: Primary: Payor: MEDICARE /  /  / ,   Secondary:    Authorization Information: OUTPATIENT BENEFITS:              DEDUCTIBLE:  $[id number]              OUT OF POCKET:  N/A              INSURANCE PAYS AT:   80%              PATIENT RESPONSIBILITY AND/OR CO-PAY:  20%  SECONDARY INSURANCE COMPANY: NA.       PRECERTIFICATION REQUIRED:  No  INSURANCE THERAPY BENEFIT:  Patient has unlimited visits based on medical necessity  Benefit will not cover maintenance or preventative treatment. AQUATIC THERAPY COVERED:   Yes  MODALITIES COVERED:  Yes, with the exception of iontophoresis and hot packs/cold packs   Visit # 5, 5/10 for progress note   Visits Allowed: BMN   Recertification Date: 34   Physician Follow-Up: 22   Physician Orders: Aquatic Therapy    History of Present Illness: Pt notes that when she was 22 she was hit in the head with a metal pole (used for hanging rugs) that fell off the ceiling. Pt notes that this resulted in onset of neck and back pain.  Pt notes that pain has been progressing since initial onset. Pt notes having an lumbar MRI about three years ago, \"Full of arthritis and degeneration and there is nothing they could do to help me\". Pt has a lumbar MRI scheduled for 12/16/2. Pt notes having injections and ablations in the past, most recent was ablation about 4-5 months ago on the right, aiding in relieving RLE symptoms, however continued with right lateral foot/ankle burning. Pt notes that her symptoms are exacerbated with \"everything\", prolonged postioning, walking, lifting, carrying. Pt notes that \"Nothing\" helps take the pain away, temporally with heat/ice. Pt notes that symptoms have remained about the same the last several of months. SUBJECTIVE: Pt stated she has been performing stretches to assist with alleviating back pain. Pt feels good after aquatic therapy sessions. Pain levels today are a little higher due to being busy this morning.      AQUATICS TREATMENT   Precautions:    Pain: 7/10 Lower back, numbness into R foot but sometimes B LE's    X in shaded column indicates activity completed today   Exercise/Intervention Sets/Sec  Notes   Walk Forward x4  X With blue board   Walk Backward x2  X With blue board   Walk Sideways x2  X With blue board          Lower Extremity Exercises:       Heel/Toe Raises 20x  X    Marches 20x  X Cueing to maintain TA activation    Squats 20x  X Cueing for pain free depth    3 Way Hip 15x  X Cueing to avoid excessive lumbar lordosis with hip extension    Hamstring Curls 15x ea  X    Lunges       Step-Ups Forward * 10 ea  X           Lower Extremity Stretches:              Seated Exercises:              Upper Extremity Exercises:       Shoulder Flexion       Shoulder ABD/ADD       Shoulder Horizontal ABD/ADD       Shoulder IR/ER       Shoulder Circles       Shoulder Shrugs       Rows       Bicep Curls              Upper Extremity Stretches:              Balance:       Standing: push/pull, sink 10x ea  x Blue board, cues to keep core engaged   Dynamic Gait:           In 4'10\"   Deep Water:       Hang 5 min  X    Bicycle 3 min  X    Hip ABD/ADD 2 min   X Cueing to maintain neutral spine     Hip Flex/Ext 2 min   X Cueing to maintain neutral spine      Specific Interventions Next Treatment: Plan to start with aquatic therapy transitioning to land based therapy as activity tolerance permits. (ROM/ LE/Core strength). Activity/Treatment Tolerance:  [x]  Patient tolerated treatment well  []  Patient limited by fatigue  []  Patient limited by pain   []  Patient limited by medical complications  []  Other:     Assessment: Pt continues to work on LE strengthening exercises in pool . L LE is weaker that R. Added blue board for increased resistance this date. Pt did comment that she was getting burning sensation on bottom of R foot during LE exercises. Body Structures/Functions/Activity Limitations: impaired activity tolerance, impaired balance, impaired endurance, impaired motor control, impaired ROM, impaired sensation, impaired strength, pain and abnormal gait  Prognosis: fair      Goals    Patient Goal: Be able to walk, stand/sit longer, and decrease pain   Short Term Goals: 4 weeks   Pt will report decreased pain levels from 7/10 to 4/10 for improved comfort and activity tolerance. Long Term Goals: 8 weeks   Pt will demo spine AROM WFL pain free to aid in ease with household chores. .   Pt will improve Oswestry score from 31/50 to <= 21/50 to indicate improved function. Pt to improve BLE strength to >= 4+/5 for ease with ambulation . Pt to be compliant and independent with HEP. Patient Education:   []  HEP/Education Completed: Body mechanics with completion of aquatic exercises.  [employer] Access Code: [email or web address]  []  No new Education completed  [x]  Reviewed Prior HEP      [x]  Patient verbalized and/or demonstrated understanding of education provided.   []  Patient unable to verbalize and/or demonstrate understanding of education provided. Will continue education. []  Barriers to learning:     PLAN:  Treatment Recommendations: Strengthening, Range of Motion, Balance Training, Functional Mobility Training, Endurance Training, Gait Training, Neuromuscular Re-education, Manual Therapy - Soft Tissue Mobilization, Pain Management, Home Exercise Program, Patient Education, Aquatics and Modalities    []  Plan of care initiated. Plan to see patient 2 times per week for 8 weeks to address the treatment planned outlined above.   [x]  Continue with current plan of care  []  Modify plan of care as follows:    []  Hold pending physician visit  []  Discharge    Time In 1420   Time Out 1500   Timed Code Minutes:  40   Total Treatment Time: 40      Electronically Signed by: Johnnie Lainez PTA

## 2022-01-04 ENCOUNTER — HOSPITAL ENCOUNTER (OUTPATIENT)
Dept: PHYSICAL THERAPY | Age: 62
Setting detail: THERAPIES SERIES
Discharge: HOME | End: 2022-01-04
Payer: MEDICARE

## 2022-01-04 PROCEDURE — 97113 AQUATIC THERAPY/EXERCISES: CPT

## 2022-01-06 ENCOUNTER — HOSPITAL ENCOUNTER (OUTPATIENT)
Dept: PHYSICAL THERAPY | Age: 62
Setting detail: THERAPIES SERIES
Discharge: HOME | End: 2022-01-06
Payer: MEDICARE

## 2022-01-06 PROCEDURE — 97110 THERAPEUTIC EXERCISES: CPT

## 2022-01-06 NOTE — PROGRESS NOTES
[address] [facility]  PHYSICAL THERAPY  [] EVALUATION  [x] DAILY NOTE (LAND) [] DAILY NOTE (AQUATIC ) [] PROGRESS NOTE [] DISCHARGE NOTE    [x] OUTPATIENT REHABILITATION [facility]   [] [place] [address]      [] [facility]   [] Fran Walkerrashardglenky    Date: 2022  Patient Name:  Romana Pae  : 1960  MRN: [id number]  CSN: [id number]    Referring Practitioner JAIME Dave*   Diagnosis Spondylosis without myelopathy or radiculopathy, lumbar region [M47.816]  Radiculopathy, lumbosacral region [M54.17]    Treatment Diagnosis Low back pain, BLE paresthesias, Core/Hip weakness, decreased flexibility    Date of Evaluation 21    Additional Pertinent History COPD, Emphysema, HTN, OA, Anxiety, Chronic Back pain , Fibro, Osteoporosis, Lumbar injections/ablations, C5-C7 injection ()       Functional Outcome Measure Used Oswestry    Functional Outcome Score 31/50 62% (21)       Insurance: Primary: Payor: MEDICARE /  /  / ,   Secondary:    Authorization Information: OUTPATIENT BENEFITS:              DEDUCTIBLE:  $[id number]              OUT OF POCKET:  N/A              INSURANCE PAYS AT:   80%              PATIENT RESPONSIBILITY AND/OR CO-PAY:  20%  SECONDARY INSURANCE COMPANY: NA.       PRECERTIFICATION REQUIRED:  No  INSURANCE THERAPY BENEFIT:  Patient has unlimited visits based on medical necessity  Benefit will not cover maintenance or preventative treatment. AQUATIC THERAPY COVERED:   Yes  MODALITIES COVERED:  Yes, with the exception of iontophoresis and hot packs/cold packs   Visit # 7, 10 for progress note   Visits Allowed: BMN   Recertification Date: 56   Physician Follow-Up: 22   Physician Orders: Aquatic Therapy    History of Present Illness: Pt notes that when she was 22 she was hit in the head with a metal pole (used for hanging rugs) that fell off the ceiling. Pt notes that this resulted in onset of neck and back pain.  Pt notes that Standing: push/pull, sink 10x ea   Blue board, cues to keep core engaged   Dynamic Gait:           In 4'10\"   Deep Water:       Hang 5 min      Bicycle 3 min      Hip ABD/ADD 2 min    Cueing to maintain neutral spine     Hip Flex/Ext 2 min    Cueing to maintain neutral spine           Nustep Level 3 6 min.  x    Supine: SKTC, HS stretch 20sec 3x x    Supine:  R HS stretch with nerve glide 10x  x Towel behind knee for support while pumping R ft. Supine: Piriformis stretch push/pull 20 sec 3x x           Hooklying; PPT 10x  x    Abdominal bracing 10sec 5x x    Abdominal bracing with marching 10x  x    Abdominal bracing: deadbug 10x  x    Hooklying; hip adductor squeeze, hip abduction 10x 5sec x    SLR 10x  x    sidelying clams 10x  x      Specific Interventions Next Treatment: Plan to start with aquatic therapy transitioning to land based therapy as activity tolerance permits. (ROM/ LE/Core strength). Activity/Treatment Tolerance:  [x]  Patient tolerated treatment well  []  Patient limited by fatigue  []  Patient limited by pain   []  Patient limited by medical complications  []  Other:     Assessment: Pt did well with first land therapy session. Focused on LE stretches and stability exercises. HS nerve glide felt good and patient had less pain at end of session 5/10 in back. Goals    Patient Goal: Be able to walk, stand/sit longer, and decrease pain   Short Term Goals: 4 weeks   Pt will report decreased pain levels from 7/10 to 4/10 for improved comfort and activity tolerance. Long Term Goals: 8 weeks   Pt will demo spine AROM WFL pain free to aid in ease with household chores. .   Pt will improve Oswestry score from 31/50 to <= 21/50 to indicate improved function. Pt to improve BLE strength to >= 4+/5 for ease with ambulation . Pt to be compliant and independent with HEP.           Patient Education:   [x]  HEP/Education Completed: abdominal bracing, single knee to chest, HS stretch, HS nerve glide, ada   [facility] Access Code: [email or web address]  []  No new Education completed  []  Reviewed Prior HEP      [x]  Patient verbalized and/or demonstrated understanding of education provided. []  Patient unable to verbalize and/or demonstrate understanding of education provided. Will continue education. []  Barriers to learning:     PLAN:  []  Plan of care initiated. Plan to see patient 2 times per week for 8 weeks to address the treatment planned outlined above.   [x]  Continue with current plan of care  []  Modify plan of care as follows:    []  Hold pending physician visit  []  Discharge    Time In 1415   Time Out 1500   Timed Code Minutes:  45   Total Treatment Time: 45      Electronically Signed by: Hany Juarez PTA

## 2022-01-12 ENCOUNTER — HOSPITAL ENCOUNTER (OUTPATIENT)
Dept: PHYSICAL THERAPY | Age: 62
Setting detail: THERAPIES SERIES
Discharge: HOME | End: 2022-01-12
Payer: MEDICARE

## 2022-01-12 PROCEDURE — 97110 THERAPEUTIC EXERCISES: CPT

## 2022-01-12 NOTE — PROGRESS NOTES
** PLEASE SIGN, DATE AND TIME CERTIFICATION BELOW AND RETURN TO [facility] OUTPATIENT REHABILITATION (FAX #: [phone]). ATTEST/CO-SIGN IF ACCESSING VIA [employer]. THANK YOU.**    I certify that I have examined the patient below and determined that Physical Medicine and Rehabilitation service is necessary and that I approve the established plan of care for up to 90 days or as specifically noted.   Attestation, signature or co-signature of physician indicates approval of certification requirements.    ________________________ ____________ __________  Physician Signature   Date   Time      [facility]  PHYSICAL [facility]ERAP[facility]  [] EVALUATION  [] DAILY NOTE (LAND) [] DAILY NOTE (AQUATIC ) [x] PROGRESS NOTE [] DISCHARGE NOTE    [x] OUTPATIENT REHABILITATION [facility]   [] [place] [address]      [] [facility]   [] [place]    Date: 2022  Patient Name:  Joseph Royal  : 1960  MRN: [id number]  CSN: [id number]    Referring Practitioner JAIME Gonsalez*   Diagnosis Spondylosis without myelopathy or radiculopathy, lumbar region [M47.816]  Radiculopathy, lumbosacral region [M54.17]    Treatment Diagnosis Low back pain, BLE paresthesias, Core/Hip weakness, decreased flexibility    Date of Evaluation 21    Additional Pertinent History COPD, Emphysema, HTN, OA, Anxiety, Chronic Back pain , Fibro, Osteoporosis, Lumbar injections/ablations, C5-C7 injection ()       Functional Outcome Measure Used Oswestry    Functional Outcome Score 31/50 62% (21) ,       Insurance: Primary: Payor: MEDICARE /  /  / ,   Secondary:    Authorization Information: OUTPATIENT BENEFITS:              DEDUCTIBLE:  $[id number]              OUT OF POCKET:  N/A              INSURANCE PAYS AT:   80%              PATIENT RESPONSIBILITY AND/OR CO-PAY:  20%  SECONDARY INSURANCE COMPANY: NA.       PRECERTIFICATION REQUIRED:  No  INSURANCE THERAPY BENEFIT:  Patient has unlimited visits based on medical necessity  Benefit will not cover maintenance or preventative treatment. AQUATIC THERAPY COVERED:   Yes  MODALITIES COVERED:  Yes, with the exception of iontophoresis and hot packs/cold packs   Visit # 8, 8/10 for progress note   Visits Allowed: BMN   Recertification Date: 0/28/18   Physician Follow-Up: 2/1/22   Physician Orders: Aquatic Therapy    History of Present Illness: Pt notes that when she was 22 she was hit in the head with a metal pole (used for hanging rugs) that fell off the ceiling. Pt notes that this resulted in onset of neck and back pain. Pt notes that pain has been progressing since initial onset. Pt notes having an lumbar MRI about three years ago, \"Full of arthritis and degeneration and there is nothing they could do to help me\". Pt has a lumbar MRI scheduled for 12/16/21. Pt notes having injections and ablations in the past, most recent was ablation about 4-5 months ago on the right, aiding in relieving RLE symptoms, however continued with right lateral foot/ankle burning. Pt notes that her symptoms are exacerbated with \"everything\", prolonged postioning, walking, lifting, carrying. Pt notes that \"Nothing\" helps take the pain away, temporally with heat/ice. Pt notes that symptoms have remained about the same the last several of months. Lumbar MRI report from 12/16/21 \"Overall mild degenerative changes of the lumbar spine more focally pronounced at L4-5 where there is mild to moderate bilateral neural foraminal stenosis in association with facet hypertrophy and ligamentum flavum thickening. \"     SUBJECTIVE: Pt notes that since initiating therapy her RLE symptoms have not been as intense and improved activity tolerance. Pt notes generally her pain is manageable until about midday. Pt reports about 50-60% improvement at this time. Pt notes cooking and cleaning has been becoming a little more tolerable.  Improved activity tolerance when she breaks up her activities. Pt notes that trial of land based treatment last session went well. Objective:        TRUNK RANGE OF MOTION   Flexion: WNL   Extension: WNL   Lateral Flexion Left: 75% LBP   Lateral Flexion Right: WNL   Rotation Left: 75%    Rotation Right: 90%        LOWER EXTREMITY STRENGTH     Left Right Comments   Hip Flexion 5/5  4+/5      Hip Abduction 5-/5  4/5      Hip Adduction         Hip Extension         Hip External Rotation 4+/5  4/5      Knee Flexion 5-/5 5/5      Knee Extension 5/5 5/5            TREATMENT   Precautions:    Pain: 6/10 Lower back, mild burning (like a hot poker) radiating down RLE into right lateral foot. X in shaded column indicates activity completed today          Nustep Level 3 6 min. Supine: SKTC, DKTC, HS stretch [id number]  x Cueing to relax ankle with HS stretch to decrease neural tension    Supine:  R HS stretch with nerve glide 10x  x Towel behind knee for support while pumping R ft. Supine: Piriformis stretch push/pull 30 sec 2x x           Hooklying; PPT + TA 10x  x Good TA    Abdominal bracing 10sec 5x     Abdominal bracing with marching 10x ea  x    Abdominal bracing: deadbug 10x      Hooklying; hip adductor squeeze, hip abduction 10x 5sec     SLR 10x      Plantar fascia stretch * 30s  x    Goal / Objective assessment / Pt education    x    sidelying clams 10x        Specific Interventions Next Treatment:  Land based therapy  (ROM/ LE/Core strength). Activity/Treatment Tolerance:  [x]  Patient tolerated treatment well  []  Patient limited by fatigue  []  Patient limited by pain   []  Patient limited by medical complications  []  Other:     Assessment: Pt has completed 8 PT sessions at this time (6 aquatic, 2 land), meeting 1/5 PT goals. Pt is progressing towards unmet goals. Pt notes improved pain intensity and activity tolerance since initiating therapy.   Pt noted continue with right hip weakness, moderate pain intensity, decreased/painfull spine mobility, and limited activity tolerance. Pt to benefit from continued therapy to address the above deficits. Plan to continue with land based treatment rather than aquatic at this time secondary to improved activity tolerance. Goals    Patient Goal: Be able to walk, stand/sit longer, and decrease pain   Short Term Goals: 4 weeks   Pt will report decreased pain levels from 7/10 to 4/10 for improved comfort and activity tolerance. GOAL NOT MET: 6/10. Continue Goal    Long Term Goals: 8 weeks   Pt will demo spine AROM WFL pain free to aid in ease with household chores. GOAL NOT MET: Progressing towards goal.  Continue Goal   Pt will improve Oswestry score from 31/50 to <= 21/50 to indicate improved function. GOAL NOT MET: 28/50. Continue Goal  Pt to improve BLE strength to >= 4+/5 for ease with ambulation . GOAL NOT MET: Right Hip not met. Continue Goal  Pt to be compliant and independent with HEP. GOAL MET:  Currently met, will continue to progress as appropriate . Continue Goal          Patient Education:   [x]  HEP/Education Completed: Goal completion, updated HEP, body mechanics.  [employer] Access Code: [email or web address], [id number]  []  No new Education completed  []  Reviewed Prior HEP      [x]  Patient verbalized and/or demonstrated understanding of education provided. []  Patient unable to verbalize and/or demonstrate understanding of education provided. Will continue education. []  Barriers to learning:     PLAN:  []  Plan of care initiated. Plan to see patient 2 times per week for 6 weeks to address the treatment planned outlined above.   []  Continue with current plan of care   [x]  Modify plan of care as follows: 2x/week for 6 weeks  []  Hold pending physician visit  []  Discharge    Time In 1234   Time Out 1603   Timed Code Minutes: 49   Total Treatment Time: 49     Electronically Signed by: Debbie Campos, PT

## 2022-01-18 ENCOUNTER — HOSPITAL ENCOUNTER (OUTPATIENT)
Dept: PHYSICAL THERAPY | Age: 62
Setting detail: THERAPIES SERIES
Discharge: HOME | End: 2022-01-18
Payer: MEDICARE

## 2022-01-18 PROCEDURE — 97110 THERAPEUTIC EXERCISES: CPT

## 2022-01-18 PROCEDURE — 97112 NEUROMUSCULAR REEDUCATION: CPT

## 2022-01-18 NOTE — PROGRESS NOTES
[address] [facility]  PHYSICAL THERAPY  [] EVALUATION  [x] DAILY NOTE (LAND) [] DAILY NOTE (AQUATIC ) [] PROGRESS NOTE [] DISCHARGE NOTE    [x] OUTPATIENT REHABILITATION CENTER [facility]   [] [place] [address]      [] [facility]   [] [place]    Date: 2022  Patient Name:  Greg Ariza  : 1960  MRN: [id number]  CSN: [id number]    Referring Practitioner JAIME Houston*   Diagnosis Spondylosis without myelopathy or radiculopathy, lumbar region [M47.816]  Radiculopathy, lumbosacral region [M54.17]    Treatment Diagnosis Low back pain, BLE paresthesias, Core/Hip weakness, decreased flexibility    Date of Evaluation 21    Additional Pertinent History COPD, Emphysema, HTN, OA, Anxiety, Chronic Back pain , Fibro, Osteoporosis, Lumbar injections/ablations, C5-C7 injection ()       Functional Outcome Measure Used Oswestry    Functional Outcome Score 31/50 62% (21) ,       Insurance: Primary: Payor: MEDICARE /  /  / ,   Secondary:    Authorization Information: OUTPATIENT BENEFITS:              DEDUCTIBLE:  $203              OUT OF POCKET:  N/A              INSURANCE PAYS AT:   80%              PATIENT RESPONSIBILITY AND/OR CO-PAY:  20%  SECONDARY INSURANCE COMPANY: NA.       PRECERTIFICATION REQUIRED:  No  INSURANCE THERAPY BENEFIT:  Patient has unlimited visits based on medical necessity  Benefit will not cover maintenance or preventative treatment. AQUATIC THERAPY COVERED:   Yes  MODALITIES COVERED:  Yes, with the exception of iontophoresis and hot packs/cold packs   Visit # 9, 0/10 for progress note   Visits Allowed: BMN   Recertification Date:    Physician Follow-Up: 22   Physician Orders: Aquatic Therapy    History of Present Illness: Pt notes that when she was 22 she was hit in the head with a metal pole (used for hanging rugs) that fell off the ceiling. Pt notes that this resulted in onset of neck and back pain.  Pt notes that pain has been progressing since initial onset. Pt notes having an lumbar MRI about three years ago, \"Full of arthritis and degeneration and there is nothing they could do to help me\". Pt has a lumbar MRI scheduled for 12/16/21. Pt notes having injections and ablations in the past, most recent was ablation about 4-5 months ago on the right, aiding in relieving RLE symptoms, however continued with right lateral foot/ankle burning. Pt notes that her symptoms are exacerbated with \"everything\", prolonged postioning, walking, lifting, carrying. Pt notes that \"Nothing\" helps take the pain away, temporally with heat/ice. Pt notes that symptoms have remained about the same the last several of months. Lumbar MRI report from 12/16/21 \"Overall mild degenerative changes of the lumbar spine more focally pronounced at L4-5 where there is mild to moderate bilateral neural foraminal stenosis in association with facet hypertrophy and ligamentum flavum thickening. \"     SUBJECTIVE: Pt notes that she has been feeling pretty good as of late. Pt notes that on 1/15/22 she started having increased radicular symptoms, completed her HEP which helped reduced her symptoms. Pt notes compliance with updated HEP, going well. Pt notes overall she has been sleeping \"pretty good\".    Objective:       TREATMENT   Precautions:    Pain: 6/10 Lower back    X in shaded column indicates activity completed today   Modalities:                       Manual:                      Exercises:        Nustep Level 3 6 min.  x S8 A10   Supine: SKTC, DKTC, HS stretch [id number]  x Cueing to relax ankle with HS stretch to decrease neural tension    Supine:  Bilat HS stretch with nerve glide 10x2s  x Towel behind knee for support while mobilizing ankle    Supine: Piriformis stretch push/pull 30 sec 2x            Hooklying; PPT + TA 10x3s  x Good TA    Abdominal bracing 10sec 5x     Abdominal bracing with marching 2*x10x ea  x ** Progressed by avoiding LE DLS   TA: deadbug 9*D18  x Cueing to maintain TA and for coordination     HL SL hip abduction (Orange) ** 2x10  X    Hooklying; hip adductor squeeze, 10x 5sec     SLR 10x ea  x    Plantar fascia stretch  30s ea  x Cueing for great toe extension    Goal / Objective assessment / Pt education        sidelying clams 10x        Specific Interventions Next Treatment:  Land based therapy  (ROM/ LE/Core strength). Activity/Treatment Tolerance:  [x]  Patient tolerated treatment well  []  Patient limited by fatigue  []  Patient limited by pain   []  Patient limited by medical complications  []  Other:     Assessment: Progressed therex as indicated by * throughout flow sheet. Moderate cueing throughout PT session for proper mechanics with good demo understanding. Pt noted having some muscle soreness with completion of therex, denying any exacerbation of back pain. Pt notes feeling \"really good\" at the completion of the PT session. Goals    Patient Goal: Be able to walk, stand/sit longer, and decrease pain   Short Term Goals: 4 weeks   Pt will report decreased pain levels from 7/10 to 4/10 for improved comfort and activity tolerance. GOAL NOT MET: 6/10. Continue Goal    Long Term Goals: 8 weeks   Pt will demo spine AROM WFL pain free to aid in ease with household chores. GOAL NOT MET: Progressing towards goal.  Continue Goal   Pt will improve Oswestry score from 31/50 to <= 21/50 to indicate improved function. GOAL NOT MET: 28/50. Continue Goal  Pt to improve BLE strength to >= 4+/5 for ease with ambulation . GOAL NOT MET: Right Hip not met. Continue Goal  Pt to be compliant and independent with HEP. GOAL MET:  Currently met, will continue to progress as appropriate . Continue Goal          Patient Education:   [x]  HEP/Education Completed: updated HEP and body mechanics.     [employer] Access Code: [email or web address], [email or web address]  []  No new Education completed  []  Reviewed Prior HEP      [x]  Patient verbalized and/or demonstrated understanding of education provided. []  Patient unable to verbalize and/or demonstrate understanding of education provided. Will continue education. []  Barriers to learning:     PLAN:  []  Plan of care initiated. Plan to see patient 2 times per week for 6 weeks to address the treatment planned outlined above.   [x]  Continue with current plan of care   []  Modify plan of care as follows:   []  Hold pending physician visit  []  Discharge    Time In 0915   Time Out 0958   Timed Code Minutes: 43   Total Treatment Time: 43     Electronically Signed by: Aiyana Collazo PT

## 2022-01-21 ENCOUNTER — APPOINTMENT (OUTPATIENT)
Dept: PHYSICAL THERAPY | Age: 62
End: 2022-01-21
Payer: MEDICARE

## 2022-01-25 ENCOUNTER — HOSPITAL ENCOUNTER (OUTPATIENT)
Dept: PHYSICAL THERAPY | Age: 62
Setting detail: THERAPIES SERIES
End: 2022-01-25
Payer: MEDICARE

## 2022-01-27 ENCOUNTER — HOSPITAL ENCOUNTER (OUTPATIENT)
Dept: PHYSICAL THERAPY | Age: 62
Setting detail: THERAPIES SERIES
End: 2022-01-27
Payer: MEDICARE

## 2022-01-31 ENCOUNTER — HOSPITAL ENCOUNTER (OUTPATIENT)
Dept: PHYSICAL THERAPY | Age: 62
Setting detail: THERAPIES SERIES
Discharge: HOME | End: 2022-01-31
Payer: MEDICARE

## 2022-01-31 PROCEDURE — 97110 THERAPEUTIC EXERCISES: CPT

## 2022-01-31 NOTE — PROGRESS NOTES
[address] [facility]  PHYSICAL THERAPY  [] EVALUATION  [x] DAILY NOTE (LAND) [] DAILY NOTE (AQUATIC ) [] PROGRESS NOTE [] DISCHARGE NOTE    [x] OUTPATIENT REHABILITATION CENTER [facility]   [] [facility]      [] [facility]   [] [facility]    Date: 2022  Patient Name:  Vince Rosario  : 1960  MRN: [id number]  CSN: [id number]    Referring Practitioner JAIME Gusman*   Diagnosis Spondylosis without myelopathy or radiculopathy, lumbar region [M47.816]  Radiculopathy, lumbosacral region [M54.17]    Treatment Diagnosis Low back pain, BLE paresthesias, Core/Hip weakness, decreased flexibility    Date of Evaluation 21    Additional Pertinent History COPD, Emphysema, HTN, OA, Anxiety, Chronic Back pain , Fibro, Osteoporosis, Lumbar injections/ablations, C5-C7 injection ()       Functional Outcome Measure Used Oswestry    Functional Outcome Score 31/50 62% (21) ,       Insurance: Primary: Payor: MEDICARE /  /  / ,   Secondary:    Authorization Information: OUTPATIENT BENEFITS:              DEDUCTIBLE:  $203              OUT OF POCKET:  N/A              INSURANCE PAYS AT:   80%              PATIENT RESPONSIBILITY AND/OR CO-PAY:  20%  SECONDARY INSURANCE COMPANY: NA.       PRECERTIFICATION REQUIRED:  No  INSURANCE THERAPY BENEFIT:  Patient has unlimited visits based on medical necessity  Benefit will not cover maintenance or preventative treatment. AQUATIC THERAPY COVERED:   Yes  MODALITIES COVERED:  Yes, with the exception of iontophoresis and hot packs/cold packs   Visit # 10, 1/10 for progress note   Visits Allowed: BMN   Recertification Date:    Physician Follow-Up: 22   Physician Orders: Aquatic Therapy    History of Present Illness: Pt notes that when she was 22 she was hit in the head with a metal pole (used for hanging rugs) that fell off the ceiling. Pt notes that this resulted in onset of neck and back pain.  Pt notes that pain has been progressing since initial onset. Pt notes having an lumbar MRI about three years ago, \"Full of arthritis and degeneration and there is nothing they could do to help me\". Pt has a lumbar MRI scheduled for 12/16/21. Pt notes having injections and ablations in the past, most recent was ablation about 4-5 months ago on the right, aiding in relieving RLE symptoms, however continued with right lateral foot/ankle burning. Pt notes that her symptoms are exacerbated with \"everything\", prolonged postioning, walking, lifting, carrying. Pt notes that \"Nothing\" helps take the pain away, temporally with heat/ice. Pt notes that symptoms have remained about the same the last several of months. Lumbar MRI report from 12/16/21 \"Overall mild degenerative changes of the lumbar spine more focally pronounced at L4-5 where there is mild to moderate bilateral neural foraminal stenosis in association with facet hypertrophy and ligamentum flavum thickening. \"     SUBJECTIVE: Patient reports that she took it easy this weekend. She rates her lower back pain a 6/10. She states that she feels therapy has been helping. She reports compliance with her HEP. Objective:   TREATMENT   Precautions:    Pain: 6/10 Lower back    X in shaded column indicates activity completed today   Modalities:                       Manual:                      Exercises:        Nustep Level 3 6 min.   X S8 A10   Supine: SKTC, DKTC, HS stretch [id number]  X Cueing to relax ankle with HS stretch to decrease neural tension    Supine:  Bilat HS stretch with nerve glide 10x2s  X Towel behind knee for support while mobilizing ankle    Supine: Piriformis stretch push/pull 30 sec 2x X           Hooklying; PPT + TA 10x3s  X Good TA    Abdominal bracing 10sec 5x     Abdominal bracing with marching 2x10x ea  X Progressed by avoiding LE DLS   TA: deadbug 0W85  X Cueing to maintain TA and for coordination     HL SL hip abduction (Orange) 2x10  X    Hooklying; hip adductor squeeze 15x 5 sec X    SLR 10x ea  X    Bridges 10 3 sec  X    Plantar fascia stretch  30s ea   Cueing for great toe extension    Goal / Objective assessment / Pt education        sidelying clams with Orange theraband  10x  X      Specific Interventions Next Treatment:  Land based therapy  (ROM/ LE/Core strength). Activity/Treatment Tolerance:  [x]  Patient tolerated treatment well  []  Patient limited by fatigue  []  Patient limited by pain   []  Patient limited by medical complications  []  Other:     Assessment: Patient completed therapeutic exercises as documented above with addition to bridges. Added orange theraband with sidelying clamshell exercise. She was provided with occasional cues on proper technique with exercises to ensure maximal muscle activation. Cues also provided for abdominal bracing while she performed exercises. She tolerated treatment session well. She noted some stiffness at the conclusion of session but denied any increase in her pain. Goals    Patient Goal: Be able to walk, stand/sit longer, and decrease pain   Short Term Goals: 4 weeks   Pt will report decreased pain levels from 7/10 to 4/10 for improved comfort and activity tolerance. Long Term Goals: 8 weeks   Pt will demo spine AROM WFL pain free to aid in ease with household chores. Pt will improve Oswestry score from 31/50 to <= 21/50 to indicate improved function. Pt to improve BLE strength to >= 4+/5 for ease with ambulation . Pt to be compliant and independent with HEP. GOAL MET:  Currently met, will continue to progress as appropriate . Patient Education:   [x]  HEP/Education Completed: Continue with HEP. Cues for abdominal bracing, Added bridges, added theraband with clamshells.  [employer] Access Code: [email or web address], [email or web address]  []  No new Education completed  [x]  Reviewed Prior HEP      [x]  Patient verbalized and/or demonstrated understanding of education provided.   []  Patient unable to verbalize and/or demonstrate understanding of education provided. Will continue education. []  Barriers to learning:     PLAN:  []  Plan of care initiated. Plan to see patient 2 times per week for 6 weeks to address the treatment planned outlined above.   [x]  Continue with current plan of care   []  Modify plan of care as follows:   []  Hold pending physician visit  []  Discharge    Time In 1500   Time Out 1544   Timed Code Minutes: 44 min   Total Treatment Time: 44 min     Electronically Signed by: Hollie Crow PTA

## 2022-02-01 ENCOUNTER — OFFICE VISIT (OUTPATIENT)
Dept: PHYSICAL MEDICINE AND REHAB | Age: 62
End: 2022-02-01
Payer: MEDICARE

## 2022-02-01 ENCOUNTER — TELEPHONE (OUTPATIENT)
Dept: PHYSICAL MEDICINE AND REHAB | Age: 62
End: 2022-02-01

## 2022-02-01 VITALS
SYSTOLIC BLOOD PRESSURE: 124 MMHG | HEIGHT: 66 IN | DIASTOLIC BLOOD PRESSURE: 64 MMHG | BODY MASS INDEX: 25.88 KG/M2 | WEIGHT: 161 LBS

## 2022-02-01 DIAGNOSIS — G89.4 CHRONIC PAIN SYNDROME: ICD-10-CM

## 2022-02-01 DIAGNOSIS — M54.2 NECK PAIN: ICD-10-CM

## 2022-02-01 DIAGNOSIS — M48.061 NEUROFORAMINAL STENOSIS OF LUMBAR SPINE: ICD-10-CM

## 2022-02-01 DIAGNOSIS — M47.812 SPONDYLOSIS OF CERVICAL REGION WITHOUT MYELOPATHY OR RADICULOPATHY: Primary | ICD-10-CM

## 2022-02-01 DIAGNOSIS — M54.17 LUMBOSACRAL RADICULITIS: ICD-10-CM

## 2022-02-01 PROCEDURE — G8419 CALC BMI OUT NRM PARAM NOF/U: HCPCS | Performed by: NURSE PRACTITIONER

## 2022-02-01 PROCEDURE — 99214 OFFICE O/P EST MOD 30 MIN: CPT | Performed by: NURSE PRACTITIONER

## 2022-02-01 PROCEDURE — G8427 DOCREV CUR MEDS BY ELIG CLIN: HCPCS | Performed by: NURSE PRACTITIONER

## 2022-02-01 PROCEDURE — 4004F PT TOBACCO SCREEN RCVD TLK: CPT | Performed by: NURSE PRACTITIONER

## 2022-02-01 PROCEDURE — G8484 FLU IMMUNIZE NO ADMIN: HCPCS | Performed by: NURSE PRACTITIONER

## 2022-02-01 PROCEDURE — 3017F COLORECTAL CA SCREEN DOC REV: CPT | Performed by: NURSE PRACTITIONER

## 2022-02-01 ASSESSMENT — ENCOUNTER SYMPTOMS
BACK PAIN: 1
RESPIRATORY NEGATIVE: 1

## 2022-02-01 NOTE — PROGRESS NOTES
[facility] [address]  Dept: [phone]  Dept Fax: [phone]: [phone]    Visit Date: 2/1/2022    Functionality Assessment/Goals Worksheet     On a scale of 0 (Does not Interfere) to 10 (Completely Interferes)     1. Which number describes how during the past week pain has interfered with       the following:  A. General Activity:  0  B. Mood: 0  C. Walking Ability:  5  D. Normal Work (Includes both work outside the home and housework):  5  E. Relations with Other People:   3  F. Sleep:   3  G. Enjoyment of Life:   0    2. Patient Prefers to Take their Pain Medications:     []  On a regular basis   []  Only when necessary    [x]  Does not take pain medications    3. What are the Patient's Goals/Expectations for Visiting Pain Management? []  Learn about my pain    []  Receive Medication   []  Physical Therapy     []  Treat Depression   [x]  Receive Injections    []  Treat Sleep   []  Deal with Anxiety and Stress   []  Treat Opoid Dependence/Addiction   []  Other:      HPI:   Vicente Clement is a 59 y.o. female is here today for    Chief Complaint: Neck pain, low back pain     HPI   FU from bilateral C-facet MBB # 2 from 12/21/2021. Received at least 80% relief of neck pain initially for for over a week and relief headaches too and continues to receive relief of about 50%. Pain is overall better. Pain is slowly increasing in posterior neck radiating across shoulder blades- pressure, aching pain.    Has been doing much more, dishes, chores, cooking and feeling much better overall after procedure   Has been working with physical therapy for the past 2 months for low back and leg pain and not really having that anymore as the exercises are really helping     Continues Ibuprofen prn   Pain increases with bending, lifting, twisting , pushing, turning head, standing, raising arms, getting up and down and housework or working at job. Medications reviewed. Patient denies side effects with medications. Patient states she is taking medications as prescribed. Shedenies receiving pain medications from other sources. She denies any ER visits since last visit. Pain scale with out pain medications or at its worst is 5/10 today     Lumbar MRI:   FINDINGS:   There is minimal, grade 1, anterolisthesis of L3 relative to L4 on the basis of degenerative change. There is otherwise anatomic vertebral body height and alignment. There is hyperintense T1 and T2 signal at the opposing endplates of P6-0 with associated    small osteophytes as evidence for Modic 2 degenerative change. There are patchy areas of hyperintense T1 and T2 signal throughout the lumbar vertebral column as evidence for hemangiomas and/or focal fatty infiltration. Marrow signal is otherwise within    normal limits. The conus terminates in a normal fashion at the L1 level. The cauda equina is normal in appearance without nerve root thickening or clumping identified. Paraspinal soft tissues are unremarkable. At T12-L1 there is a minimal disc bulge without significant spinal canal or neuroforaminal stenosis. At L1-2 there is no significant spinal canal or neuroforaminal stenosis. At L2-3 there is a minimal disc bulge without significant spinal canal stenosis and mild bilateral neural foraminal stenosis. There is a minimal focus of hyperintense T2 signal in the periphery of the disc at the left neural foramen as evidence for    minimal annular fissure. At L3-4 there is minimal partial uncovering the disc with superimposed shallow disc bulge with small focus of hyperintense T2 signal at the periphery of the foraminal region as evidence for annular fissure. This mildly indents thecal sac but does not    contact traversing nerve roots.  There is mild bilateral neural foraminal stenosis in association with mild ligamentum flavum thickening and facet hypertrophy. At L4-5 there is a shallow disc bulge which mildly indents thecal sac but does not contact traversing nerve roots. There is mild to moderate bilateral neural foraminal stenosis in association with facet hypertrophy and ligament flavum thickening. At L5-S1 there is no significant spinal canal or neuroforaminal stenosis.         Impression    Overall mild degenerative changes of the lumbar spine more focally pronounced at L4-5 where there is mild to moderate bilateral neural foraminal stenosis in association with facet hypertrophy and ligamentum flavum thickening. The patientis allergic to nsaids, pcn [penicillins], and medrol [methylprednisolone]. Subjective:      Review of Systems   Constitutional: Negative. Respiratory: Negative. Cardiovascular: Negative. Musculoskeletal: Positive for arthralgias, back pain, gait problem, myalgias, neck pain and neck stiffness. No assist devices    Neurological: Positive for weakness and numbness. Negative for headaches. Psychiatric/Behavioral: Negative for sleep disturbance. Objective:     Vitals:    02/01/22 1008   BP: 124/64   Weight: 161 lb (73 kg)   Height: 5' 6\" (1.676 m)       Physical Exam  Vitals and nursing note reviewed. Constitutional:       General: She is not in acute distress. Appearance: She is well-developed. She is not diaphoretic. HENT:      Head: Normocephalic and atraumatic. Right Ear: External ear normal.      Left Ear: External ear normal.      Nose: Nose normal.      Mouth/Throat:      Pharynx: No oropharyngeal exudate. Eyes:      General: No scleral icterus. Right eye: No discharge. Left eye: No discharge. Conjunctiva/sclera: Conjunctivae normal.      Pupils: Pupils are equal, round, and reactive to light. Neck:      Thyroid: No thyromegaly. Cardiovascular:      Rate and Rhythm: Normal rate and regular rhythm.       Heart sounds: Normal heart sounds. No murmur heard. No friction rub. No gallop. Pulmonary:      Effort: Pulmonary effort is normal. No respiratory distress. Breath sounds: Normal breath sounds. No wheezing or rales. Chest:      Chest wall: No tenderness. Abdominal:      General: Bowel sounds are normal. There is no distension. Palpations: Abdomen is soft. Tenderness: There is no abdominal tenderness. There is no guarding or rebound. Musculoskeletal:         General: Tenderness present. Cervical back: Neck supple. Spasms, tenderness and bony tenderness present. No edema, erythema or rigidity. No muscular tenderness. Decreased range of motion. Thoracic back: Tenderness present. Lumbar back: Tenderness and bony tenderness present. Decreased range of motion. Positive right straight leg raise test. Negative left straight leg raise test.        Back:    Skin:     General: Skin is warm. Coloration: Skin is not pale. Findings: No erythema or rash. Neurological:      Mental Status: She is alert and oriented to person, place, and time. She is not disoriented. Cranial Nerves: No cranial nerve deficit. Sensory: Sensory deficit present. Motor: Weakness present. No atrophy or abnormal muscle tone. Coordination: Coordination normal.      Gait: Gait normal.      Deep Tendon Reflexes: Reflexes are normal and symmetric. Babinski sign absent on the right side. Babinski sign absent on the left side. Psychiatric:         Attention and Perception: Attention normal. She is attentive. Mood and Affect: Mood normal. Mood is not anxious or depressed. Affect is not labile, blunt, angry or inappropriate. Speech: Speech normal. She is communicative. Speech is not rapid and pressured, delayed, slurred or tangential.         Behavior: Behavior normal. Behavior is not agitated, slowed, aggressive, withdrawn, hyperactive or combative. Behavior is cooperative. Thought Content: Thought content normal. Thought content is not paranoid or delusional. Thought content does not include homicidal or suicidal ideation. Thought content does not include homicidal or suicidal plan. Cognition and Memory: Cognition normal. Memory is not impaired. She does not exhibit impaired recent memory or impaired remote memory. Judgment: Judgment normal. Judgment is not impulsive or inappropriate. BETHANY  Patricks test  positive  Yeoman's  positive  Gaenslen's  positive         Assessment:     1. Spondylosis of cervical region without myelopathy or radiculopathy    2. Neck pain    3. Lumbosacral radiculitis    4. Neuroforaminal stenosis of lumbar spine    5. Chronic pain syndrome            Plan:      · OARRS reviewed. Current MED: 0  · Patient was offered naloxone for home. · Discussed long term side effects of medications, tolerance, dependency and addiction. · Previous UDS reviewed  · UDS preformed today for compliance. · Patient told can not receive any pain medications from any other source. · No evidence of abuse, diversion or aberrant behavior.  Medications and/or procedures to improve function and quality of life- patient understanding with this and that may not be pain free   Discussed with patient about safe storage of medications at home   Discussed possible weaning of medication dosing dependent on treatment/procedure results.  Discussed with patient about risks with procedure including infection, reaction to medication, increased pain, or bleeding.  Procedure notes reviewed in detail    Received 80% relief from C-facet MBB # 2 with improvement of mobility and headaches   · Plan bilateral C-facet RFA @ C5-6, and C6-7 for longer term pain relief. Procedure and risks discussed in detail with patient.   · If patient is on blood thinners will need approval to hold: baby aspirin and Ibuprofen    Reviewed therapy notes- really helping low back and leg pain. reviewed Lumbar MRI. Discussed repeating L-facet RFA in future if needed vs TFLESI    Not needing any pain medications       Meds. Prescribed:   No orders of the defined types were placed in this encounter. Return for bilateral C-facet RFA @ C5-6, and C6-7. , follow up after procedure.                Electronically signed by JAIME Crowe CNP on2/1/2022 at 10:43 AM

## 2022-02-02 ENCOUNTER — PREP FOR PROCEDURE (OUTPATIENT)
Dept: PHYSICAL MEDICINE AND REHAB | Age: 62
End: 2022-02-02

## 2022-02-03 ENCOUNTER — APPOINTMENT (OUTPATIENT)
Dept: PHYSICAL THERAPY | Age: 62
End: 2022-02-03
Payer: MEDICARE

## 2022-02-08 ENCOUNTER — HOSPITAL ENCOUNTER (OUTPATIENT)
Dept: PHYSICAL THERAPY | Age: 62
Setting detail: THERAPIES SERIES
Discharge: HOME | End: 2022-02-08
Payer: MEDICARE

## 2022-02-08 PROCEDURE — 97110 THERAPEUTIC EXERCISES: CPT

## 2022-02-08 PROCEDURE — 97112 NEUROMUSCULAR REEDUCATION: CPT

## 2022-02-08 NOTE — PROGRESS NOTES
[address] [facility]  PHYSICAL THERAPY  [] EVALUATION  [x] DAILY NOTE (LAND) [] DAILY NOTE (AQUATIC ) [] PROGRESS NOTE [] DISCHARGE NOTE    [x] OUTPATIENT REHABILITATION CENTER [facility]   [] [place] [address]      [] [facility]   [] Valentin Amel    Date: 2022  Patient Name:  Alexsander Steward  : 1960  MRN: [id number]  CSN: [id number]    Referring Practitioner JAIME Joel*   Diagnosis Spondylosis without myelopathy or radiculopathy, lumbar region [M47.816]  Radiculopathy, lumbosacral region [M54.17]    Treatment Diagnosis Low back pain, BLE paresthesias, Core/Hip weakness, decreased flexibility    Date of Evaluation 21    Additional Pertinent History COPD, Emphysema, HTN, OA, Anxiety, Chronic Back pain , Fibro, Osteoporosis, Lumbar injections/ablations, C5-C7 injection ()       Functional Outcome Measure Used Oswestry    Functional Outcome Score 31/50 62% (21) ,       Insurance: Primary: Payor: MEDICARE /  /  / ,   Secondary:    Authorization Information: OUTPATIENT BENEFITS:              DEDUCTIBLE:  $[id number]              OUT OF POCKET:  N/A              INSURANCE PAYS AT:   80%              PATIENT RESPONSIBILITY AND/OR CO-PAY:  20%  SECONDARY INSURANCE COMPANY: NA.       PRECERTIFICATION REQUIRED:  No  INSURANCE THERAPY BENEFIT:  Patient has unlimited visits based on medical necessity  Benefit will not cover maintenance or preventative treatment. AQUATIC THERAPY COVERED:   Yes  MODALITIES COVERED:  Yes, with the exception of iontophoresis and hot packs/cold packs   Visit # 11, 3/10 for progress note   Visits Allowed: BMN   Recertification Date: 83   Physician Follow-Up: 22   Physician Orders: Aquatic Therapy    History of Present Illness: Pt notes that when she was 22 she was hit in the head with a metal pole (used for hanging rugs) that fell off the ceiling. Pt notes that this resulted in onset of neck and back pain.  Pt notes that pain has been progressing since initial onset. Pt notes having an lumbar MRI about three years ago, \"Full of arthritis and degeneration and there is nothing they could do to help me\". Pt has a lumbar MRI scheduled for 12/16/21. Pt notes having injections and ablations in the past, most recent was ablation about 4-5 months ago on the right, aiding in relieving RLE symptoms, however continued with right lateral foot/ankle burning. Pt notes that her symptoms are exacerbated with \"everything\", prolonged postioning, walking, lifting, carrying. Pt notes that \"Nothing\" helps take the pain away, temporally with heat/ice. Pt notes that symptoms have remained about the same the last several of months. Lumbar MRI report from 12/16/21 \"Overall mild degenerative changes of the lumbar spine more focally pronounced at L4-5 where there is mild to moderate bilateral neural foraminal stenosis in association with facet hypertrophy and ligamentum flavum thickening. \"     SUBJECTIVE: Pt notes that she has been unable to take ibuprofen secondary to upcoming cervical ablation scheduled for 2/11/22. Pt notes feeling \"good' after her last PT session. Pt notes overall HEP is going well. Pt notes decreased right foot burning symptoms as of late. Objective:   TREATMENT   Precautions:    Pain: 6/10 Lower back    X in shaded column indicates activity completed today   Modalities:                       Manual:                      Exercises:        Nustep Level 4* 5 min.   X S9 A10   Supine: SKTC, DKTC, HS stretch [id number]   Cueing to relax ankle with HS stretch to decrease neural tension    Supine:  Bilat HS stretch with nerve glide 10x2s  X Towel behind knee for support while mobilizing ankle    Supine: Piriformis stretch push/pull 30 sec 2x X           Hooklying; PPT + TA 10x3s  X Good TA    Abdominal bracing 10sec 5x     Abdominal bracing with marching 2x10x ea   Progressed by avoiding LE DLS   TA: deadbug [id number]  X Cueing to maintain TA and for coordination     HL SL hip abduction (Holcomb) 2x10      Hooklying; hip adductor squeeze 15x 5 sec     SLR 10x ea  X    Bridges + Orange * 2x10 3 sec  X Cueing for TA/glute engagment   Plantar fascia stretch  35s ea   Cueing for great toe extension    Goal / Objective assessment / Pt education        sidelying clams with Orange theraband  2*x10  x    Standing        t-band resisted row ** 2x10   x Orange    t-band resisted shoulder extension                 Specific Interventions Next Treatment:  Land based therapy  (ROM/ LE/Core strength), advance to [facility] therex as appropriate monitoring symptoms response. Activity/Treatment Tolerance:  [x]  Patient tolerated treatment well  []  Patient limited by fatigue  []  Patient limited by pain   []  Patient limited by medical complications  []  Other:     Assessment: Gradual progressions in re-current therex and additional exercises as indicted by * throughout flow sheet. Pt noting having some soreness throughout targeted musculature. Pt demo improved TA activation/motor control allowing for approrpaite progressions. Pt notes feeling \"better\" at the completion of the PT session. Goals    Patient Goal: Be able to walk, stand/sit longer, and decrease pain   Short Term Goals: 4 weeks   Pt will report decreased pain levels from 7/10 to 4/10 for improved comfort and activity tolerance. Long Term Goals: 8 weeks   Pt will demo spine AROM WFL pain free to aid in ease with household chores. Pt will improve Oswestry score from 31/50 to <= 21/50 to indicate improved function. Pt to improve BLE strength to >= 4+/5 for ease with ambulation . Pt to be compliant and independent with HEP. GOAL MET:  Currently met, will continue to progress as appropriate . Patient Education:   [x]  HEP/Education Completed: Updated HEP and body mechanics.     [employer] Access Code: [email or web address], [email or web address]  []  No new Education completed  [x]  Reviewed Prior HEP      [x]  Patient verbalized and/or demonstrated understanding of education provided. []  Patient unable to verbalize and/or demonstrate understanding of education provided. Will continue education. []  Barriers to learning:     PLAN:  []  Plan of care initiated. Plan to see patient 2 times per week for 6 weeks to address the treatment planned outlined above.   [x]  Continue with current plan of care   []  Modify plan of care as follows:   []  Hold pending physician visit  []  Discharge    Time In 1303   Time Out 1345   Timed Code Minutes: 42   Total Treatment Time: 42     Electronically Signed by: Pat Cunningham, PT

## 2022-02-10 ENCOUNTER — HOSPITAL ENCOUNTER (OUTPATIENT)
Dept: PHYSICAL THERAPY | Age: 62
Setting detail: THERAPIES SERIES
Discharge: HOME | End: 2022-02-10
Payer: MEDICARE

## 2022-02-10 PROCEDURE — 97110 THERAPEUTIC EXERCISES: CPT

## 2022-02-10 PROCEDURE — 97112 NEUROMUSCULAR REEDUCATION: CPT

## 2022-02-10 NOTE — PROGRESS NOTES
[address] [facility]  PHYSICAL THERAPY  [] EVALUATION  [x] DAILY NOTE (LAND) [] DAILY NOTE (AQUATIC ) [] PROGRESS NOTE [] DISCHARGE NOTE    [x] OUTPATIENT REHABILITATION [facility]   [] [place] [address]      [] [facility]   [] Flore[address]    Date: 2/10/2022  Patient Name:  Yamel Luciano  : 1960  MRN: [id number]  CSN: [id number]    Referring Practitioner JAIME Branch*   Diagnosis Spondylosis without myelopathy or radiculopathy, lumbar region [M47.816]  Radiculopathy, lumbosacral region [M54.17]    Treatment Diagnosis Low back pain, BLE paresthesias, Core/Hip weakness, decreased flexibility    Date of Evaluation 21    Additional Pertinent History COPD, Emphysema, HTN, OA, Anxiety, Chronic Back pain , Fibro, Osteoporosis, Lumbar injections/ablations, C5-C7 injection ()       Functional Outcome Measure Used Oswestry    Functional Outcome Score 31/50 62% (21) ,       Insurance: Primary: Payor: MEDICARE /  /  / ,   Secondary:    Authorization Information: OUTPATIENT BENEFITS:              DEDUCTIBLE:  $[id number]              OUT OF POCKET:  N/A              INSURANCE PAYS AT:   80%              PATIENT RESPONSIBILITY AND/OR CO-PAY:  20%  SECONDARY INSURANCE COMPANY: NA.       PRECERTIFICATION REQUIRED:  No  INSURANCE THERAPY BENEFIT:  Patient has unlimited visits based on medical necessity  Benefit will not cover maintenance or preventative treatment. AQUATIC THERAPY COVERED:   Yes  MODALITIES COVERED:  Yes, with the exception of iontophoresis and hot packs/cold packs   Visit # 11, 3/10 for progress note   Visits Allowed: THERESA   Recertification Date: 3/90/94   Physician Follow-Up: 22   Physician Orders: Aquatic Therapy    History of Present Illness: Pt notes that when she was 22 she was hit in the head with a metal pole (used for hanging rugs) that fell off the ceiling. Pt notes that this resulted in onset of neck and back pain.  Pt notes Orange  2x10 3 sec  X Cueing for TA/glute engagment   Plantar fascia stretch  35s ea   Cueing for great toe extension    Goal / Objective assessment / Pt education        sidelying clams with Orange theraband  2x10  x    Standing        t-band resisted row  2x10   x Orange    t-band resisted shoulder extension  ** 2x10  x [employer] **        Standing HS / Calf Stretch **  2x30s ea  x Cueing for proper body mechanics   t-band resisted 3-way hip **          Specific Interventions Next Treatment:  Land based therapy  (ROM/ LE/Core strength), advance to [facility] as appropriate monitoring symptoms response. Activity/Treatment Tolerance:  [x]  Patient tolerated treatment well  []  Patient limited by fatigue  []  Patient limited by pain   []  Patient limited by medical complications  []  Other:     Assessment: Progressed threrex with completing Nu-Step w/o UE support and added t-band resisted shoulder extension. Pt demo proper mechanics and adequate control c completion. Pt noted having some fatigue throughout PT session, denying any exacerbation of pain. Pt notes \"feel good\" at the completion of the PT session. Goals    Patient Goal: Be able to walk, stand/sit longer, and decrease pain   Short Term Goals: 4 weeks   Pt will report decreased pain levels from 7/10 to 4/10 for improved comfort and activity tolerance. Long Term Goals: 8 weeks   Pt will demo spine AROM WFL pain free to aid in ease with household chores. Pt will improve Oswestry score from 31/50 to <= 21/50 to indicate improved function. Pt to improve BLE strength to >= 4+/5 for ease with ambulation . Pt to be compliant and independent with HEP. GOAL MET:  Currently met, will continue to progress as appropriate . Patient Education: Body mechanics, continue with current HEP, POC. [x]  HEP/Education Completed: Updated HEP and body mechanics.     [employer] Access Code: [email or web address]  []  No new Education completed  [x]  Reviewed Prior HEP      [x]  Patient verbalized and/or demonstrated understanding of education provided. []  Patient unable to verbalize and/or demonstrate understanding of education provided. Will continue education. []  Barriers to learning:     PLAN:  []  Plan of care initiated. Plan to see patient 2 times per week for 6 weeks to address the treatment planned outlined above.   [x]  Continue with current plan of care   []  Modify plan of care as follows:   []  Hold pending physician visit  []  Discharge    Time In 1414   Time Out 1500   Timed Code Minutes: 46   Total Treatment Time: 46     Electronically Signed by: Merna Koenig, PT

## 2022-02-11 ENCOUNTER — APPOINTMENT (OUTPATIENT)
Dept: GENERAL RADIOLOGY | Age: 62
End: 2022-02-11
Attending: PAIN MEDICINE
Payer: MEDICARE

## 2022-02-11 ENCOUNTER — ANESTHESIA (OUTPATIENT)
Dept: OPERATING ROOM | Age: 62
End: 2022-02-11
Payer: MEDICARE

## 2022-02-11 ENCOUNTER — ANESTHESIA EVENT (OUTPATIENT)
Dept: OPERATING ROOM | Age: 62
End: 2022-02-11
Payer: MEDICARE

## 2022-02-11 ENCOUNTER — HOSPITAL ENCOUNTER (OUTPATIENT)
Age: 62
Setting detail: OUTPATIENT SURGERY
Discharge: HOME | End: 2022-02-11
Attending: PAIN MEDICINE | Admitting: PAIN MEDICINE
Payer: MEDICARE

## 2022-02-11 VITALS
DIASTOLIC BLOOD PRESSURE: 99 MMHG | WEIGHT: 162 LBS | BODY MASS INDEX: 26.03 KG/M2 | HEART RATE: 102 BPM | TEMPERATURE: 97.2 F | OXYGEN SATURATION: 96 % | RESPIRATION RATE: 18 BRPM | HEIGHT: 66 IN | SYSTOLIC BLOOD PRESSURE: 127 MMHG

## 2022-02-11 VITALS
OXYGEN SATURATION: 98 % | SYSTOLIC BLOOD PRESSURE: 113 MMHG | TEMPERATURE: 96.8 F | DIASTOLIC BLOOD PRESSURE: 75 MMHG | RESPIRATION RATE: 16 BRPM

## 2022-02-11 PROCEDURE — 3209999900 FLUORO FOR SURGICAL PROCEDURES

## 2022-02-11 PROCEDURE — 2709999900 HC NON-CHARGEABLE SUPPLY: Performed by: PAIN MEDICINE

## 2022-02-11 PROCEDURE — 7100000011 HC PHASE II RECOVERY - ADDTL 15 MIN: Performed by: PAIN MEDICINE

## 2022-02-11 PROCEDURE — 3700000000 HC ANESTHESIA ATTENDED CARE: Performed by: PAIN MEDICINE

## 2022-02-11 PROCEDURE — 3700000001 HC ADD 15 MINUTES (ANESTHESIA): Performed by: PAIN MEDICINE

## 2022-02-11 PROCEDURE — 64633 DESTROY CERV/THOR FACET JNT: CPT | Performed by: PAIN MEDICINE

## 2022-02-11 PROCEDURE — 2500000003 HC RX 250 WO HCPCS: Performed by: NURSE ANESTHETIST, CERTIFIED REGISTERED

## 2022-02-11 PROCEDURE — 2500000003 HC RX 250 WO HCPCS: Performed by: PAIN MEDICINE

## 2022-02-11 PROCEDURE — 7100000010 HC PHASE II RECOVERY - FIRST 15 MIN: Performed by: PAIN MEDICINE

## 2022-02-11 PROCEDURE — 3600000055 HC PAIN LEVEL 3 ADDL 15 MIN: Performed by: PAIN MEDICINE

## 2022-02-11 PROCEDURE — 64634 DESTROY C/TH FACET JNT ADDL: CPT | Performed by: PAIN MEDICINE

## 2022-02-11 PROCEDURE — 3600000054 HC PAIN LEVEL 3 BASE: Performed by: PAIN MEDICINE

## 2022-02-11 PROCEDURE — 6360000002 HC RX W HCPCS: Performed by: PAIN MEDICINE

## 2022-02-11 PROCEDURE — 6360000002 HC RX W HCPCS: Performed by: NURSE ANESTHETIST, CERTIFIED REGISTERED

## 2022-02-11 RX ORDER — BUPIVACAINE HYDROCHLORIDE 2.5 MG/ML
INJECTION, SOLUTION EPIDURAL; INFILTRATION; INTRACAUDAL PRN
Status: DISCONTINUED | OUTPATIENT
Start: 2022-02-11 | End: 2022-02-11 | Stop reason: ALTCHOICE

## 2022-02-11 RX ORDER — PROPOFOL 10 MG/ML
INJECTION, EMULSION INTRAVENOUS PRN
Status: DISCONTINUED | OUTPATIENT
Start: 2022-02-11 | End: 2022-02-11 | Stop reason: SDUPTHER

## 2022-02-11 RX ORDER — FENTANYL CITRATE 50 UG/ML
INJECTION, SOLUTION INTRAMUSCULAR; INTRAVENOUS PRN
Status: DISCONTINUED | OUTPATIENT
Start: 2022-02-11 | End: 2022-02-11 | Stop reason: SDUPTHER

## 2022-02-11 RX ORDER — LIDOCAINE HYDROCHLORIDE 20 MG/ML
INJECTION, SOLUTION EPIDURAL; INFILTRATION; INTRACAUDAL; PERINEURAL PRN
Status: DISCONTINUED | OUTPATIENT
Start: 2022-02-11 | End: 2022-02-11 | Stop reason: SDUPTHER

## 2022-02-11 RX ORDER — LIDOCAINE HYDROCHLORIDE 10 MG/ML
INJECTION, SOLUTION INFILTRATION; PERINEURAL PRN
Status: DISCONTINUED | OUTPATIENT
Start: 2022-02-11 | End: 2022-02-11 | Stop reason: ALTCHOICE

## 2022-02-11 RX ORDER — DEXAMETHASONE SODIUM PHOSPHATE 4 MG/ML
INJECTION, SOLUTION INTRA-ARTICULAR; INTRALESIONAL; INTRAMUSCULAR; INTRAVENOUS; SOFT TISSUE PRN
Status: DISCONTINUED | OUTPATIENT
Start: 2022-02-11 | End: 2022-02-11 | Stop reason: ALTCHOICE

## 2022-02-11 RX ADMIN — PROPOFOL 50 MG: 10 INJECTION, EMULSION INTRAVENOUS at 10:03

## 2022-02-11 RX ADMIN — FENTANYL CITRATE 100 MCG: 50 INJECTION, SOLUTION INTRAMUSCULAR; INTRAVENOUS at 09:46

## 2022-02-11 RX ADMIN — PROPOFOL 50 MG: 10 INJECTION, EMULSION INTRAVENOUS at 10:08

## 2022-02-11 RX ADMIN — LIDOCAINE HYDROCHLORIDE 40 MG: 20 INJECTION, SOLUTION EPIDURAL; INFILTRATION; INTRACAUDAL; PERINEURAL at 09:46

## 2022-02-11 ASSESSMENT — PULMONARY FUNCTION TESTS
PIF_VALUE: 0
PIF_VALUE: 1
PIF_VALUE: 0
PIF_VALUE: 1
PIF_VALUE: 0
PIF_VALUE: 0
PIF_VALUE: 1
PIF_VALUE: 0
PIF_VALUE: 1
PIF_VALUE: 0
PIF_VALUE: 1
PIF_VALUE: 0
PIF_VALUE: 1

## 2022-02-11 ASSESSMENT — PAIN - FUNCTIONAL ASSESSMENT: PAIN_FUNCTIONAL_ASSESSMENT: 0-10

## 2022-02-11 ASSESSMENT — PAIN DESCRIPTION - DESCRIPTORS: DESCRIPTORS: CONSTANT

## 2022-02-11 NOTE — H&P
[address] [place] [address]  History and Physical Update    Pt Name: Gerhardt Cane  MRN: [id number]  YOB: 1960  Date of evaluation: 2/11/2022      I have examined the patient and reviewed the H&P/Consult and there are no changes to the patient or plans.         Electronically signed by Ayse Joel MD on 2/11/2022 at 9:29 AM

## 2022-02-11 NOTE — OP NOTE
Pre-Procedure Note    Patient Name: Leno Rea   YOB: 1960  Medical Record Number: [id number]  Date: 2/11/22     Indication:  Neck pain  Consent: On file. Vital Signs:   Vitals:    02/11/22 0844   BP: 125/80   Pulse: 90   Resp: 16   Temp: 97 °F (36.1 °C)   SpO2: 97%       Past Medical History:   has a past medical history of Anxiety, Chronic back pain, COPD (chronic obstructive pulmonary disease) ([facility] Depression, Disc disorder, Emphysema of lung ([facility] Fibromyalgia, Hyperlipidemia, Hypertension, Osteoarthritis, and Osteoporosis. Past Surgical History:   has a past surgical history that includes Tonsillectomy (1972); Knee arthroscopy (Bilateral, 2019); Facet joint injection (Bilateral, 5/14/2020); Facet joint injection (Bilateral, 6/17/2020); Pain management procedure (Bilateral, 7/22/2020); Pain management procedure (Right, 10/7/2020); Pain management procedure (Left, 11/4/2020); Facet joint injection (Bilateral, 1/20/2021); Pain management procedure (Left, 6/8/2021); Pain management procedure (Right, 7/27/2021); and Facet joint injection (Bilateral, 12/21/2021). Pre-Sedation Documentation and Exam:   Vital signs have been reviewed (see flow sheet for vitals). Sedation/ Anesthesia :  MAC. Patient is an appropriate candidate for plan of sedation: yes      Preoperative Diagnosis:  C-spondylosis    Post-Op Dx: as above      Procedure Performed:  Radiofrequency abalation of median branchs at the levels of C5-6 and C6-7 bilateral under fluoroscopic guidance     Indication for the Procedure: The patient had history of chronic neck pain that is not responding well to the conservative treatment. Patient's pain is mostly axial in nature. Pain is interfering with the activities of daily living. Physical examination revealed facet tenderness and facet loading is positive.   The patient had undergone cervical  facet joint injections with pain relief that lasted for only a short period of time and confirmatory median branch block gave patient pain relief of 70 % . Hence we decided to do radiofrequency abalation of median branches for long term pain relief. The procedure and risks were discussed with the patient and an informed consent was obtained. Procedure:      A meaningful communication was kept up with the patient throughout  the procedure. Patient is placed in prone position and skin over the back was prepped and draped in sterile manner. Then using fluoroscopy the waist of facet joint complex of the vertebra was observed and the view was optimized . The skin and deep tissues posterior were infiltrated with 20 ml of 1% Xylocaine. The RF canula with the 10 mm active tip was introduced through the skin wheal under fluoroscopy guidance such that the tip of the needle lies in the groove of the waist of facet joint complex. Then a lateral view of cervical  spine was obtained to make sure the tip of needle is in the centroid of the articular pillar.  hen electric impedence was checked to make sure it is acceptable. Then a sensory stimulus was applied at 50 Hz up to 1 volt and concordant pain symptoms were reproduced. Then a motor stimulus was applied at 2 Hz up to 2 volts and no motor stimulation was seen in upper extremities. Some multifidus stimulus was seen. Then after negative aspiration a mixture of dexamethasone 6 mg  and 0.25%  Marcaine 2 cc was injected through the needle in divided doses . Then an  lesion was done at 80 degrees centigrade for 90 seconds. EBL-0     Patient tolerated the procedure well and was discharged home in stable condition.     Electronically signed by Amish Avendaño MD on 2/11/22 at 9:51 AM EST

## 2022-02-11 NOTE — ANESTHESIA POSTPROCEDURE EVALUATION
Department of Anesthesiology  Postprocedure Note    Patient: Prince Duran  MRN: [id number]  YOB: 1960  Date of evaluation: 2/11/2022  Time:  10:56 AM     Procedure Summary     Date: 02/11/22 Room / Location: [address] [place] [address] 03 / [address] [facility]    Anesthesia Start: 3460 Anesthesia Stop: 0187    Procedure: bilateral C-facet RFA @ C5-6, and C6-7. (Bilateral Neck) Diagnosis: (Spondylosis of cervical region without myelopathy or radiculopath)    Surgeons: Fermin Coppola MD Responsible Provider: Niels Garnett DO    Anesthesia Type: MAC ASA Status: 3          Anesthesia Type: MAC    Kimberlee Phase I:      Kimberlee Phase II: Kimberlee Score: 10    Last vitals: Reviewed and per EMR flowsheets.        Anesthesia Post Evaluation    Patient location during evaluation: bedside  Patient participation: complete - patient participated  Level of consciousness: awake  Airway patency: patent  Nausea & Vomiting: no vomiting and no nausea  Cardiovascular status: hemodynamically stable  Respiratory status: acceptable  Hydration status: stable

## 2022-02-11 NOTE — ANESTHESIA PRE PROCEDURE
Department of Anesthesiology  Preprocedure Note       Name:  Larry Garza   Age:  59 y.o.  :  1960                                          MRN:  [id number]         Date:  2022      Surgeon: Jmashid Trivedi):  Lidia Adams MD    Procedure: Procedure(s):  bilateral C-facet RFA @ C5-6, and C6-7. Medications prior to admission:   Prior to Admission medications    Medication Sig Start Date End Date Taking? Authorizing Provider   methocarbamol (ROBAXIN) 750 MG tablet Take 750 mg by mouth 4 times daily   Yes Historical Provider, MD   loratadine (CLARITIN) 10 MG tablet Take 10 mg by mouth daily   Yes Historical Provider, MD   dilTIAZem (CARDIZEM) 30 MG tablet take 1 tablet by mouth twice a day 20  Yes Historical Provider, MD   lisinopril (PRINIVIL;ZESTRIL) 5 MG tablet  20  Yes Historical Provider, MD   amitriptyline (ELAVIL) 50 MG tablet Take 1 tablet by mouth daily 16  Yes Historical Provider, MD   aspirin 81 MG tablet Take 81 mg by mouth daily    Historical Provider, MD   simvastatin (ZOCOR) 20 MG tablet take 1 tablet by mouth every evening 20   Historical Provider, MD   cyclobenzaprine (FLEXERIL) 10 MG tablet MAY TAKE 1 ONE TABLET EVERY 8 HOURS AS NEEDED FOR MUSCLE PAIN 20   Historical Provider, MD ANSARI VITAMIN D-3 125 MCG (5000 UT) CAPS capsule take 1 capsule by mouth once daily WITH SUPPER 20   Historical Provider, MD   calcium carbonate 1500 (600 Ca) MG TABS tablet  20   Historical Provider, MD   albuterol sulfate  (90 Base) MCG/ACT inhaler inhale 2 puffs by mouth every 4 hours if needed for SPASMODIC COU. ..  (REFER TO PRESCRIPTION NOTES).  20   Historical Provider, MD   ibuprofen (ADVIL;MOTRIN) 800 MG tablet Take 1 tablet by mouth 3 times daily 16   Historical Provider, MD   Ascorbic Acid (VITAMIN C) 500 MG tablet Take by mouth daily    Historical Provider, MD   VITAMIN D, CHOLECALCIFEROL, PO Take 1 tablet by mouth daily    Historical Provider, MD       Current medications:    No current facility-administered medications for this encounter. Allergies: Allergies   Allergen Reactions    Nsaids Other (See Comments)     STOPPED BREATHING    Pcn [Penicillins] Other (See Comments)     DIZZINESS    Medrol [Methylprednisolone] Palpitations       Problem List:    Patient Active Problem List   Diagnosis Code    [facility]per[facility] I73.9    Lumbar spondylosis M47.816    Cervical spondylosis M47.812       Past Medical History:        Diagnosis Date    Anxiety     Chronic back pain     COPD (chronic obstructive pulmonary disease) ([facility]     Depression     Disc disorder     [facility]     Fibromyalgia     Hyperlipidemia     Hypertension     Osteoarthritis     Osteoporosis        Past Surgical History:        Procedure Laterality Date    FACET JOINT INJECTION Bilateral 5/14/2020    bilat. L3, L4 medial branch and L5 dorsal rami injection performed by Ondina Martinez MD at [facility] 9 Bilateral 6/17/2020    bilateral C5-6-7 medial branch block performed by Ondina Martinez MD at [address] [facility] INJECTION Bilateral 1/20/2021    bilateral C5-6-7 medial branch block performed by Ondina Martinez MD at [facility] INJECTION Bilateral 12/21/2021    bilateral C-facet MBB # 2 @ C5-6, and C6-7 for diagnostic purpose performed by Yakelin Sosa MD at [address] [facility] ARTHROSCOPY Bilateral 2019    PAIN MANAGEMENT PROCEDURE Bilateral 7/22/2020    bilateral L3-4 medial branch L5 dorsal rami performed by Ondina Martinez MD at [facility] Right 10/7/2020    RFA, L3-4 medial branch and L5 dorsal rami, right. performed by Ondina Martinez MD at [place] [address] Left 11/4/2020    RFA, L3-4 medial branch and L5 dorsal rami, left. PREGSERUM, HCG, HCGQUANT     ABGs: No results found for: PHART, PO2ART, EHV1ZJT, AWL0NAP, BEART, T1EWEKAV     Type & Screen (If Applicable):  No results found for: LABABO, LABRH    Drug/Infectious Status (If Applicable):  No results found for: HIV, HEPCAB    COVID-19 Screening (If Applicable):   Lab Results   Component Value Date    COVID19 Not Detected 07/17/2020           Anesthesia Evaluation  Patient summary reviewed  Airway: Mallampati: III  TM distance: >3 FB   Neck ROM: full  Mouth opening: > = 3 FB Dental:          Pulmonary:   (+) COPD:                             Cardiovascular:                      Neuro/Psych:   (+) neuromuscular disease:, psychiatric history:            GI/Hepatic/Renal:             Endo/Other:                     Abdominal:             Vascular: Other Findings:             Anesthesia Plan      MAC     ASA 3       Induction: intravenous. Anesthetic plan and risks discussed with patient. Plan discussed with MARKEL Ramirez.  [address] [place] [address]   2/11/2022

## 2022-02-11 NOTE — H&P
H&P    Bilateral C-facet MBB # 2 from 12/21/2021. Received at least 80% relief of neck pain initially for for over a week and relief headaches too and continues to receive relief of about 50%. Pain is overall better. Pain is slowly increasing in posterior neck radiating across shoulder blades- pressure, aching pain. Has been doing much more, dishes, chores, cooking and feeling much better overall after procedure   Has been working with physical therapy for the past 2 months for low back and leg pain and not really having that anymore as the exercises are really helping      Continues Ibuprofen prn   Pain increases with bending, lifting, twisting , pushing, turning head, standing, raising arms, getting up and down and housework or working at job.        Medications reviewed. Patient denies side effects with medications. Patient states she is taking medications as prescribed. Shedenies receiving pain medications from other sources. She denies any ER visits since last visit.     Pain scale with out pain medications or at its worst is 5/10 today      Lumbar MRI:   FINDINGS:   There is minimal, grade 1, anterolisthesis of L3 relative to L4 on the basis of degenerative change. There is otherwise anatomic vertebral body height and alignment. There is hyperintense T1 and T2 signal at the opposing endplates of M5-7 with associated    small osteophytes as evidence for Modic 2 degenerative change. There are patchy areas of hyperintense T1 and T2 signal throughout the lumbar vertebral column as evidence for hemangiomas and/or focal fatty infiltration. Marrow signal is otherwise within    normal limits. The conus terminates in a normal fashion at the L1 level. The cauda equina is normal in appearance without nerve root thickening or clumping identified. Paraspinal soft tissues are unremarkable. At T12-L1 there is a minimal disc bulge without significant spinal canal or neuroforaminal stenosis.    At L1-2 there is no significant spinal canal or neuroforaminal stenosis. At L2-3 there is a minimal disc bulge without significant spinal canal stenosis and mild bilateral neural foraminal stenosis. There is a minimal focus of hyperintense T2 signal in the periphery of the disc at the left neural foramen as evidence for    minimal annular fissure. At L3-4 there is minimal partial uncovering the disc with superimposed shallow disc bulge with small focus of hyperintense T2 signal at the periphery of the foraminal region as evidence for annular fissure. This mildly indents thecal sac but does not    contact traversing nerve roots. There is mild bilateral neural foraminal stenosis in association with mild ligamentum flavum thickening and facet hypertrophy. At L4-5 there is a shallow disc bulge which mildly indents thecal sac but does not contact traversing nerve roots. There is mild to moderate bilateral neural foraminal stenosis in association with facet hypertrophy and ligament flavum thickening. At L5-S1 there is no significant spinal canal or neuroforaminal stenosis.         Impression    Overall mild degenerative changes of the lumbar spine more focally pronounced at L4-5 where there is mild to moderate bilateral neural foraminal stenosis in association with facet hypertrophy and ligamentum flavum thickening.         The patientis allergic to nsaids, pcn [penicillins], and medrol [methylprednisolone].       Subjective:      Review of Systems   Constitutional: Negative. Respiratory: Negative. Cardiovascular: Negative. Musculoskeletal: Positive for arthralgias, back pain, gait problem, myalgias, neck pain and neck stiffness. No assist devices    Neurological: Positive for weakness and numbness. Negative for headaches.    Psychiatric/Behavioral: Negative for sleep disturbance.         Objective:      Vitals       Vitals:     02/01/22 1008   BP: 124/64   Weight: 161 lb (73 kg)   Height: 5' 6\" (1.676 m)       Physical Exam  Vitals and nursing note reviewed. Constitutional:       General: She is not in acute distress. Appearance: She is well-developed. She is not diaphoretic. HENT:      Head: Normocephalic and atraumatic. Right Ear: External ear normal.      Left Ear: External ear normal.      Nose: Nose normal.      Mouth/Throat:      Pharynx: No oropharyngeal exudate. Eyes:      General: No scleral icterus. Right eye: No discharge. Left eye: No discharge. Conjunctiva/sclera: Conjunctivae normal.      Pupils: Pupils are equal, round, and reactive to light. Neck:      Thyroid: No thyromegaly. Cardiovascular:      Rate and Rhythm: Normal rate and regular rhythm. Heart sounds: Normal heart sounds. No murmur heard. No friction rub. No gallop. Pulmonary:      Effort: Pulmonary effort is normal. No respiratory distress. Breath sounds: Normal breath sounds. No wheezing or rales. Chest:      Chest wall: No tenderness. Abdominal:      General: Bowel sounds are normal. There is no distension. Palpations: Abdomen is soft. Tenderness: There is no abdominal tenderness. There is no guarding or rebound. Musculoskeletal:         General: Tenderness present. Cervical back: Neck supple. Spasms, tenderness and bony tenderness present. No edema, erythema or rigidity. No muscular tenderness. Decreased range of motion. Thoracic back: Tenderness present. Lumbar back: Tenderness and bony tenderness present. Decreased range of motion. Positive right straight leg raise test. Negative left straight leg raise test.        Back:    Skin:     General: Skin is warm. Coloration: Skin is not pale. Findings: No erythema or rash. Neurological:      Mental Status: She is alert and oriented to person, place, and time. She is not disoriented. Cranial Nerves: No cranial nerve deficit. Sensory: Sensory deficit present. Motor: Weakness present. may not be pain free  · Discussed with patient about safe storage of medications at home  · Discussed possible weaning of medication dosing dependent on treatment/procedure results. · Discussed with patient about risks with procedure including infection, reaction to medication, increased pain, or bleeding. · Procedure notes reviewed in detail   · Received 80% relief from C-facet MBB # 2 with improvement of mobility and headaches   · Plan bilateral C-facet RFA @ C5-6, and C6-7 for longer term pain relief. Procedure and risks discussed in detail with patient. · If patient is on blood thinners will need approval to hold: baby aspirin and Ibuprofen   · Reviewed therapy notes- really helping low back and leg pain. reviewed Lumbar MRI.  Discussed repeating L-facet RFA in future if needed vs TFLESI   · Not needing any pain medications              Return for bilateral C-facet RFA @ C5-6, and C6-7. , follow up after procedure.

## 2022-02-11 NOTE — PROGRESS NOTES
1011-Pt arrived to PACU, pt hooked up to monitor, VSS. PT coughin on arrival, pt is a smoker and ready to go home and smoke she staets. 1020- PT ride called didn't answer. 1023- IV removed, pt getting dressed got a hold of ride. 1024- PT wanted to get dressed and sit and wait for ride.    1041- Pt discharged walked out to car with this RN

## 2022-02-15 ENCOUNTER — APPOINTMENT (OUTPATIENT)
Dept: PHYSICAL THERAPY | Age: 62
End: 2022-02-15
Payer: MEDICARE

## 2022-02-17 ENCOUNTER — HOSPITAL ENCOUNTER (OUTPATIENT)
Dept: PHYSICAL THERAPY | Age: 62
Setting detail: THERAPIES SERIES
Discharge: HOME | End: 2022-02-17
Payer: MEDICARE

## 2022-02-17 PROCEDURE — 97110 THERAPEUTIC EXERCISES: CPT
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
## 2022-02-21 ENCOUNTER — HOSPITAL ENCOUNTER (OUTPATIENT)
Dept: PHYSICAL THERAPY | Age: 62
Setting detail: THERAPIES SERIES
End: 2022-02-21
Payer: MEDICARE

## 2022-02-23 ENCOUNTER — HOSPITAL ENCOUNTER (OUTPATIENT)
Dept: PHYSICAL THERAPY | Age: 62
Setting detail: THERAPIES SERIES
End: 2022-02-23
Payer: MEDICARE

## 2022-02-28 ENCOUNTER — HOSPITAL ENCOUNTER (OUTPATIENT)
Dept: PHYSICAL THERAPY | Age: 62
Setting detail: THERAPIES SERIES
Discharge: HOME | End: 2022-02-28
Payer: MEDICARE

## 2022-02-28 PROCEDURE — 97110 THERAPEUTIC EXERCISES: CPT

## 2022-02-28 PROCEDURE — 97112 NEUROMUSCULAR REEDUCATION: CPT

## 2022-02-28 NOTE — DISCHARGE SUMMARY
[facility]  PHYSICAL THERAPY  [] EVALUATION  [] DAILY NOTE (LAND) [] DAILY NOTE (AQUATIC ) [] PROGRESS NOTE [x] DISCHARGE NOTE    [x] OUTPATIENT REHABILITATION CENTER [facility]   [] [place] [address]      [] [facility]   [] Valentin Amel    Date: 2022  Patient Name:  Alexsander Steward  : 1960  MRN: [id number]  CSN: [id number]    Referring Practitioner JAIME Joel*   Diagnosis Spondylosis without myelopathy or radiculopathy, lumbar region [M47.816]  Radiculopathy, lumbosacral region [M54.17]    Treatment Diagnosis Low back pain, BLE paresthesias, Core/Hip weakness, decreased flexibility    Date of Evaluation 21    Additional Pertinent History COPD, Emphysema, HTN, OA, Anxiety, Chronic Back pain , Fibro, Osteoporosis, Lumbar injections/ablations, C5-C7 injection ()       Functional Outcome Measure Used Oswestry    Functional Outcome Score 31/50 62% (21) , 24/50 48% (22)      Insurance: Primary: Payor: José Manuel Morales /  /  / ,   Secondary:    Authorization Information: OUTPATIENT BENEFITS:              DEDUCTIBLE:  $[id number]              OUT OF POCKET:  N/A              INSURANCE PAYS AT:   80%              PATIENT RESPONSIBILITY AND/OR CO-PAY:  20%  SECONDARY INSURANCE COMPANY: NA.       PRECERTIFICATION REQUIRED:  No  INSURANCE THERAPY BENEFIT:  Patient has unlimited visits based on medical necessity  Benefit will not cover maintenance or preventative treatment. AQUATIC THERAPY COVERED:   Yes  MODALITIES COVERED:  Yes, with the exception of iontophoresis and hot packs/cold packs   Visit # 13, 5/10 for progress note   Visits Allowed: BMN   Recertification Date:    Physician Follow-Up: 22   Physician Orders: Aquatic Therapy    History of Present Illness: Pt notes that when she was 22 she was hit in the head with a metal pole (used for hanging rugs) that fell off the ceiling.  Pt notes that this resulted in onset of neck and back pain. Pt notes that pain has been progressing since initial onset. Pt notes having an lumbar MRI about three years ago, \"Full of arthritis and degeneration and there is nothing they could do to help me\". Pt has a lumbar MRI scheduled for 12/16/21. Pt notes having injections and ablations in the past, most recent was ablation about 4-5 months ago on the right, aiding in relieving RLE symptoms, however continued with right lateral foot/ankle burning. Pt notes that her symptoms are exacerbated with \"everything\", prolonged postioning, walking, lifting, carrying. Pt notes that \"Nothing\" helps take the pain away, temporally with heat/ice. Pt notes that symptoms have remained about the same the last several of months. Lumbar MRI report from 12/16/21 \"Overall mild degenerative changes of the lumbar spine more focally pronounced at L4-5 where there is mild to moderate bilateral neural foraminal stenosis in association with facet hypertrophy and ligamentum flavum thickening. \"     SUBJECTIVE:     Objective:       TRUNK RANGE OF MOTION   Flexion: WNL   Extension: WNL   Lateral Flexion Left: WNL   Lateral Flexion Right: WNL   Rotation Left: WNL   Rotation Right: WNL               LOWER EXTREMITY STRENGTH     Left Right Comments   Hip Flexion 5/5  4+/5      Hip Abduction 5/5  5-/5      Hip Adduction         Hip Extension         Hip External Rotation 5/5  5-/5      Knee Flexion 5-/5 5/5      Knee Extension 5/5 5/5                  TREATMENT   Precautions:    Pain: 4/10 Lower back     X in shaded column indicates activity completed today   Modalities:                       Manual:                      Exercises:        Nustep Level 4 5 min.    S9 A0   Supine: DKTC [id number]   Cueing to relax ankle with HS stretch to decrease neural tension    Supine:  Bilat HS stretch with nerve glide 10x2s   Towel behind knee for support while mobilizing ankle    Supine: Piriformis stretch push/pull 30 sec 2x            Hooklying; PPT + TA 10x3s   Good TA    Abdominal bracing with marching 2x10x ea   Progressed by avoiding LE DLS   TA: deadbug [id number]   Cueing to maintain TA and for coordination    SLR 15x ea      Bridges + Richardson  2x10 3 sec   Cueing for TA/glute engagment   Plantar fascia stretch  35s ea   Cueing for great toe extension    Goal / Objective assessment / Pt education        sidelying clams with Orange theraband  2x10      Standing        t-band resisted row  2x10   x Orange Cueing for scapular retraction/depression    t-band resisted shoulder extension   2x10  x [employer]  2x10x  x orange   Standing HS / Calf Stretch   2x30s ea   Cueing for proper body mechanics   Objective/Goal assessment, Pt education, updated HEP   x    t-band resisted 3-way hip   15 x  x Orange Cueing for slight bend in stance limb, decreased UE support as able maintaining trunk control      Specific Interventions Next Treatment:  Land based therapy  (ROM/ LE/Core strength), advance to [facility]x as appropriate monitoring symptoms response. Activity/Treatment Tolerance:  [x]  Patient tolerated treatment well  []  Patient limited by fatigue  []  Patient limited by pain   []  Patient limited by medical complications  []  Other:     Assessment:  Pt has completed 13 PT sessions, meeting 5/5 treatment goals. Pt has noted decreased pain and improved activity tolerance since initiating PT. Pt has demo improved pain free ROM, strength, and body mechanics since initiating PT. Pt notes feeling comfortable with d/c to independence with HEP at this time. Updated and reviewed HEP with Pt at the completion of today's sessions, Pt denied having any questions at the completion of the PT session. Goals    Patient Goal: Be able to walk, stand/sit longer, and decrease pain   Short Term Goals: 4 weeks   Pt will report decreased pain levels from 7/10 to 4/10 for improved comfort and activity tolerance. GOAL MET:  4/10.   Discontinue Goal    Long Term Goals: 8 weeks   Pt will demo spine AROM WFL pain free to aid in ease with household chores. GOAL MET:   .  Discontinue Goal  Pt will improve Oswestry score from 31/50 to <= 21/50 to indicate improved function. GOAL NOT MET: 24/50. Discontinue Goal  Pt to improve BLE strength to >= 4+/5 for ease with ambulation . GOAL MET:   .  Discontinue Goal  Pt to be compliant and independent with HEP. GOAL MET:   .  Discontinue Goal        Patient Education: Body mechanics, goal completion, updated HEP, and POC. [x]  HEP/Education Completed: 3 way hip, pall off press   Feedlooks Access Code: [email or web address]  []  No new Education completed  []  Reviewed Prior HEP      [x]  Patient verbalized and/or demonstrated understanding of education provided. []  Patient unable to verbalize and/or demonstrate understanding of education provided. Will continue education. []  Barriers to learning:     PLAN:  []  Plan of care initiated. Plan to see patient 2 times per week for 6 weeks to address the treatment planned outlined above.   [x]  Continue with current plan of care   []  Modify plan of care as follows:   []  Hold pending physician visit  [x]  Discharge    Time In 1434   Time Out 1520   Timed Code Minutes: 46   Total Treatment Time: 46     Electronically Signed by: Sara Franco, PT

## 2022-03-09 ENCOUNTER — OFFICE VISIT (OUTPATIENT)
Dept: PHYSICAL MEDICINE AND REHAB | Age: 62
End: 2022-03-09
Payer: MEDICARE

## 2022-03-09 VITALS
SYSTOLIC BLOOD PRESSURE: 124 MMHG | DIASTOLIC BLOOD PRESSURE: 82 MMHG | WEIGHT: 162 LBS | HEIGHT: 66 IN | BODY MASS INDEX: 26.03 KG/M2

## 2022-03-09 DIAGNOSIS — M54.2 NECK PAIN: ICD-10-CM

## 2022-03-09 DIAGNOSIS — M47.816 LUMBAR SPONDYLOSIS: ICD-10-CM

## 2022-03-09 DIAGNOSIS — M48.061 NEUROFORAMINAL STENOSIS OF LUMBAR SPINE: ICD-10-CM

## 2022-03-09 DIAGNOSIS — M47.812 SPONDYLOSIS OF CERVICAL REGION WITHOUT MYELOPATHY OR RADICULOPATHY: Primary | ICD-10-CM

## 2022-03-09 DIAGNOSIS — G89.4 CHRONIC PAIN SYNDROME: ICD-10-CM

## 2022-03-09 DIAGNOSIS — M54.17 LUMBOSACRAL RADICULITIS: ICD-10-CM

## 2022-03-09 PROCEDURE — 3017F COLORECTAL CA SCREEN DOC REV: CPT | Performed by: NURSE PRACTITIONER

## 2022-03-09 PROCEDURE — G8484 FLU IMMUNIZE NO ADMIN: HCPCS | Performed by: NURSE PRACTITIONER

## 2022-03-09 PROCEDURE — 4004F PT TOBACCO SCREEN RCVD TLK: CPT | Performed by: NURSE PRACTITIONER

## 2022-03-09 PROCEDURE — G8419 CALC BMI OUT NRM PARAM NOF/U: HCPCS | Performed by: NURSE PRACTITIONER

## 2022-03-09 PROCEDURE — G8427 DOCREV CUR MEDS BY ELIG CLIN: HCPCS | Performed by: NURSE PRACTITIONER

## 2022-03-09 PROCEDURE — 99214 OFFICE O/P EST MOD 30 MIN: CPT | Performed by: NURSE PRACTITIONER

## 2022-03-09 ASSESSMENT — ENCOUNTER SYMPTOMS
BACK PAIN: 1
RESPIRATORY NEGATIVE: 1

## 2022-03-09 NOTE — PROGRESS NOTES
[address] [place] [address]  Dept: [phone]  Dept Fax: [phone]: [phone]    Visit Date: 3/9/2022    Functionality Assessment/Goals Worksheet     On a scale of 0 (Does not Interfere) to 10 (Completely Interferes)     1. Which number describes how during the past week pain has interfered with       the following:  A. General Activity:  4  B. Mood: 3  C. Walking Ability:  4  D. Normal Work (Includes both work outside the home and housework):  7  E. Relations with Other People:   3  F. Sleep:   4  G. Enjoyment of Life:   3    2. Patient Prefers to Take their Pain Medications:     []  On a regular basis   []  Only when necessary    []  Does not take pain medications    3. What are the Patient's Goals/Expectations for Visiting Pain Management? []  Learn about my pain    [x]  Receive Medication   []  Physical Therapy     [x]  Treat Depression   [x]  Receive Injections    []  Treat Sleep   []  Deal with Anxiety and Stress   []  Treat Opoid Dependence/Addiction   []  Other:      HPI:   Joseph Segovia is a 59 y.o. female is here today for    Chief Complaint: Neck pain, low back pain     HPI   FU from bilateral C-facet RFA from 2/11/2022. Receiving over 80% relief of neck pain and not having any headaches any more since procedure and also helped upper back and arm pain. Pain is much more tolerable and tolerating more activity. Just has complaints of intermitted stiff pain and pressure left side of neck and if she cracks neck or stretches a certain way it goes away. Not having much low back or leg pain since working with therapy and doing home exercises. Still good relief from L-facet RFA     Able to tolerate full activity.      Continues Ibuprofen prn   Pain increases with bending, lifting, twisting  and housework or working at job [employer] over do [employer] Medications reviewed. Patient denies side effects with medications. Patient states she is taking medications as prescribed. Shedenies receiving pain medications from other sources. She denies any ER visits since last visit. Pain scale with out pain medications or at its worst is 3-4/10. The patientis allergic to nsaids, pcn [penicillins], and medrol [methylprednisolone]. Subjective:      Review of Systems   Constitutional: Negative. Respiratory: Negative. Cardiovascular: Negative. Musculoskeletal: Positive for arthralgias, back pain, gait problem, myalgias, neck pain and neck stiffness. No assist devices    Neurological: Positive for numbness. Negative for weakness and headaches. Psychiatric/Behavioral: Negative for sleep disturbance. Objective:     Vitals:    03/09/22 0858   BP: 124/82   Weight: 162 lb (73.5 kg)   Height: 5' 6\" (1.676 m)       Physical Exam  Vitals and nursing note reviewed. Constitutional:       General: She is not in acute distress. Appearance: She is well-developed. She is not diaphoretic. HENT:      Head: Normocephalic and atraumatic. Right Ear: External ear normal.      Left Ear: External ear normal.      Nose: Nose normal.      Mouth/Throat:      Pharynx: No oropharyngeal exudate. Eyes:      General: No scleral icterus. Right eye: No discharge. Left eye: No discharge. Conjunctiva/sclera: Conjunctivae normal.      Pupils: Pupils are equal, round, and reactive to light. Neck:      Thyroid: No thyromegaly. Cardiovascular:      Rate and Rhythm: Normal rate and regular rhythm. Heart sounds: Normal heart sounds. No murmur heard. No friction rub. No gallop. Pulmonary:      Effort: Pulmonary effort is normal. No respiratory distress. Breath sounds: Normal breath sounds. No wheezing or rales. Chest:      Chest wall: No tenderness. Abdominal:      General: Bowel sounds are normal. There is no distension. Palpations: Abdomen is soft. Tenderness: There is no abdominal tenderness. There is no guarding or rebound. Musculoskeletal:         General: Tenderness present. Cervical back: Rigidity, spasms, tenderness and bony tenderness present. No edema or erythema. No muscular tenderness. Decreased range of motion. Thoracic back: Tenderness present. Lumbar back: Tenderness and bony tenderness present. Decreased range of motion. Positive right straight leg raise test. Negative left straight leg raise test.        Back:    Skin:     General: Skin is warm. Coloration: Skin is not pale. Findings: No erythema or rash. Neurological:      Mental Status: She is alert and oriented to person, place, and time. She is not disoriented. Cranial Nerves: No cranial nerve deficit. Sensory: Sensory deficit present. Motor: Weakness present. No atrophy or abnormal muscle tone. Coordination: Coordination normal.      Gait: Gait normal.      Deep Tendon Reflexes: Reflexes are normal and symmetric. Babinski sign absent on the right side. Babinski sign absent on the left side. Psychiatric:         Attention and Perception: Attention normal. She is attentive. Mood and Affect: Mood normal. Mood is not anxious or depressed. Affect is not labile, blunt, angry or inappropriate. Speech: Speech normal. She is communicative. Speech is not rapid and pressured, delayed, slurred or tangential.         Behavior: Behavior normal. Behavior is not agitated, slowed, aggressive, withdrawn, hyperactive or combative. Behavior is cooperative. Thought Content: Thought content normal. Thought content is not paranoid or delusional. Thought content does not include homicidal or suicidal ideation. Thought content does not include homicidal or suicidal plan. Cognition and Memory: Cognition normal. Memory is not impaired.  She does not exhibit impaired recent memory or impaired remote memory. Judgment: Judgment normal. Judgment is not impulsive or inappropriate.        BETHANY  Patricks test  positive  Yeoman's  positive  Gaenslen's  positive          Assessment:     1. Spondylosis of cervical region without myelopathy or radiculopathy    2. Neck pain    3. Lumbar spondylosis    4. Neuroforaminal stenosis of lumbar spine    5. Lumbosacral radiculitis    6. Chronic pain syndrome            Plan:      · OARRS reviewed. Current MED: 0  · Patient was not offered naloxone for home. · Discussed long term side effects of medications, tolerance, dependency and addiction. · Previous UDS reviewed  · UDS preformed today for compliance. · Patient told can not receive any pain medications from any other source. · No evidence of abuse, diversion or aberrant behavior.  Medications and/or procedures to improve function and quality of life- patient understanding with this and that may not be pain free   Discussed with patient about safe storage of medications at home   Discussed possible weaning of medication dosing dependent on treatment/procedure results.  Discussed with patient about risks with procedure including infection, reaction to medication, increased pain, or bleeding. · Procedure notes reviewed in detail    Receiving 80% relief of neck pain and completed relief of headaches since C-facet RFA and continues to receive that relief.  Low back and leg pain minimal from therapy and bilateral L-facet RFA- discussed repeating in future if needing to   Discussed possible trigger point injections for muscle pain if pain increases   Not needing any pain medication pain is well controlled       Meds. Prescribed:   No orders of the defined types were placed in this encounter. Return in about 2 months (around 5/9/2022), or if symptoms worsen or fail to improve, for follow up  for medications.                Electronically signed by JAIME Ramos CNP on3/9/2022 at 9:11 AM

## 2022-05-11 ENCOUNTER — OFFICE VISIT (OUTPATIENT)
Dept: PHYSICAL MEDICINE AND REHAB | Age: 62
End: 2022-05-11
Payer: MEDICARE

## 2022-05-11 VITALS
SYSTOLIC BLOOD PRESSURE: 116 MMHG | WEIGHT: 162 LBS | DIASTOLIC BLOOD PRESSURE: 82 MMHG | HEIGHT: 66 IN | BODY MASS INDEX: 26.03 KG/M2

## 2022-05-11 DIAGNOSIS — M47.816 LUMBAR SPONDYLOSIS: ICD-10-CM

## 2022-05-11 DIAGNOSIS — G89.4 CHRONIC PAIN SYNDROME: ICD-10-CM

## 2022-05-11 DIAGNOSIS — M48.061 NEUROFORAMINAL STENOSIS OF LUMBAR SPINE: ICD-10-CM

## 2022-05-11 DIAGNOSIS — M47.812 SPONDYLOSIS OF CERVICAL REGION WITHOUT MYELOPATHY OR RADICULOPATHY: Primary | ICD-10-CM

## 2022-05-11 DIAGNOSIS — M54.2 NECK PAIN: ICD-10-CM

## 2022-05-11 PROCEDURE — G8427 DOCREV CUR MEDS BY ELIG CLIN: HCPCS | Performed by: NURSE PRACTITIONER

## 2022-05-11 PROCEDURE — 4004F PT TOBACCO SCREEN RCVD TLK: CPT | Performed by: NURSE PRACTITIONER

## 2022-05-11 PROCEDURE — 99214 OFFICE O/P EST MOD 30 MIN: CPT | Performed by: NURSE PRACTITIONER

## 2022-05-11 PROCEDURE — G8419 CALC BMI OUT NRM PARAM NOF/U: HCPCS | Performed by: NURSE PRACTITIONER

## 2022-05-11 PROCEDURE — 3017F COLORECTAL CA SCREEN DOC REV: CPT | Performed by: NURSE PRACTITIONER

## 2022-05-11 ASSESSMENT — ENCOUNTER SYMPTOMS
BACK PAIN: 1
RESPIRATORY NEGATIVE: 1

## 2022-05-11 NOTE — PROGRESS NOTES
[address] [facility]  [address]  Dept: [phone]  Dept Fax: [phone]: [phone]    Visit Date: 5/11/2022    Functionality Assessment/Goals Worksheet     On a scale of 0 (Does not Interfere) to 10 (Completely Interferes)     1. Which number describes how during the past week pain has interfered with       the following:  A. General Activity:  4  B. Mood: 3  C. Walking Ability:  6  D. Normal Work (Includes both work outside the home and housework):  6  E. Relations with Other People:   0  F. Sleep:   0  G. Enjoyment of Life:   0    2. Patient Prefers to Take their Pain Medications:     [x]  On a regular basis   []  Only when necessary    []  Does not take pain medications    3. What are the Patient's Goals/Expectations for Visiting Pain Management? [x]  Learn about my pain    []  Receive Medication   []  Physical Therapy     []  Treat Depression   []  Receive Injections    []  Treat Sleep   []  Deal with Anxiety and Stress   []  Treat Opoid Dependence/Addiction   []  Other:      HPI:   Sybil Vargas is a 59 y.o. female is here today for    Chief Complaint: Neck pain, low back pain     HPI   2 month FU. Pain remains very tolerable at this time in low back and neck as she continues to receive good relief from C-facet RFA and L-facet RFA. Just mild aching pain and some stiffness in neck and low back but again well controlled. Tolerating full activity and has been doing yard work. Denies pain today     Taking Ibuprofen prn   Pain increases with getting up and down and housework or working at job, gets some pain with overdoing activity       Medications reviewed. Patient denies side effects with medications. Patient states she is taking medications as prescribed. Shedenies receiving pain medications from other sources. She denies any ER visits since last visit.     Pain scale with out pain medications or at its worst is 0/10. The patientis allergic to nsaids, pcn [penicillins], and medrol [methylprednisolone]. Subjective:      Review of Systems   Constitutional: Negative. Respiratory: Negative. Cardiovascular: Negative. Musculoskeletal: Positive for arthralgias, back pain, gait problem, myalgias, neck pain and neck stiffness. No assist devices    Neurological: Negative for weakness, numbness and headaches. Psychiatric/Behavioral: Negative for sleep disturbance. Objective:     Vitals:    05/11/22 0851   BP: 116/82   Site: Left Upper Arm   Position: Sitting   Cuff Size: Large Adult   Weight: 162 lb (73.5 kg)   Height: 5' 6\" (1.676 m)       Physical Exam  Vitals and nursing note reviewed. Constitutional:       General: She is not in acute distress. Appearance: She is well-developed. She is not diaphoretic. HENT:      Head: Normocephalic and atraumatic. Right Ear: External ear normal.      Left Ear: External ear normal.      Nose: Nose normal.      Mouth/Throat:      Pharynx: No oropharyngeal exudate. Eyes:      General: No scleral icterus. Right eye: No discharge. Left eye: No discharge. Conjunctiva/sclera: Conjunctivae normal.      Pupils: Pupils are equal, round, and reactive to light. Neck:      Thyroid: No thyromegaly. Cardiovascular:      Rate and Rhythm: Normal rate and regular rhythm. Heart sounds: Normal heart sounds. No murmur heard. No friction rub. No gallop. Pulmonary:      Effort: Pulmonary effort is normal. No respiratory distress. Breath sounds: Normal breath sounds. No wheezing or rales. Chest:      Chest wall: No tenderness. Abdominal:      General: Bowel sounds are normal. There is no distension. Palpations: Abdomen is soft. Tenderness: There is no abdominal tenderness. There is no guarding or rebound. Musculoskeletal:         General: Tenderness present.       Cervical back: Rigidity, spasms, tenderness and bony tenderness present. No edema or erythema. No muscular tenderness. Decreased range of motion. Thoracic back: Tenderness present. Lumbar back: Tenderness and bony tenderness present. Decreased range of motion. Positive right straight leg raise test. Negative left straight leg raise test.        Back:    Skin:     General: Skin is warm. Coloration: Skin is not pale. Findings: No erythema or rash. Neurological:      Mental Status: She is alert and oriented to person, place, and time. She is not disoriented. Cranial Nerves: No cranial nerve deficit. Sensory: No sensory deficit. Motor: Weakness present. No atrophy or abnormal muscle tone. Coordination: Coordination normal.      Gait: Gait normal.      Deep Tendon Reflexes: Reflexes are normal and symmetric. Babinski sign absent on the right side. Babinski sign absent on the left side. Psychiatric:         Attention and Perception: Attention normal. She is attentive. Mood and Affect: Mood normal. Mood is not anxious or depressed. Affect is not labile, blunt, angry or inappropriate. Speech: Speech normal. She is communicative. Speech is not rapid and pressured, delayed, slurred or tangential.         Behavior: Behavior normal. Behavior is not agitated, slowed, aggressive, withdrawn, hyperactive or combative. Behavior is cooperative. Thought Content: Thought content normal. Thought content is not paranoid or delusional. Thought content does not include homicidal or suicidal ideation. Thought content does not include homicidal or suicidal plan. Cognition and Memory: Cognition normal. Memory is not impaired. She does not exhibit impaired recent memory or impaired remote memory. Judgment: Judgment normal. Judgment is not impulsive or inappropriate. Assessment:     1. Spondylosis of cervical region without myelopathy or radiculopathy    2.  Neck pain    3. Lumbar spondylosis    4. Neuroforaminal stenosis of lumbar spine    5. Chronic pain syndrome            Plan:      · OARRS reviewed. Current MED: 0  · Patient was offered naloxone for home. · Discussed long term side effects of medications, tolerance, dependency and addiction. · Previous UDS reviewed  · UDS preformed today for compliance. · Patient told can not receive any pain medications from any other source. · No evidence of abuse, diversion or aberrant behavior.  Medications and/or procedures to improve function and quality of life- patient understanding with this and that may not be pain free   Discussed with patient about safe storage of medications at home   Discussed possible weaning of medication dosing dependent on treatment/procedure results.  Discussed with patient about risks with procedure including infection, reaction to medication, increased pain, or bleeding. · Procedure notes reviewed in detail   · Receiving over 85% relief of neck pain and low back pain from C-facet RFA and L-facet RFA. Pain very tolerable. Discussed repeating when needing to. · Discussed trigger point injections for muscle pain if needed. · FU prn if and when pain returns       Meds. Prescribed:   No orders of the defined types were placed in this encounter. Return if symptoms worsen or fail to improve.                Electronically signed by JAIME Tony CNP on5/11/2022 at 9:01 AM

## 2022-09-08 ENCOUNTER — TELEPHONE (OUTPATIENT)
Dept: PHYSICAL MEDICINE AND REHAB | Age: 62
End: 2022-09-08

## 2022-09-08 NOTE — TELEPHONE ENCOUNTER
Pt. Called and LVM that when her pain increased to call and get rescheduled for an ablation per Yamel Pedraza and she wants to get scheduled.  Please call

## 2022-09-12 ENCOUNTER — TELEPHONE (OUTPATIENT)
Dept: PHYSICAL MEDICINE AND REHAB | Age: 62
End: 2022-09-12

## 2022-09-26 ENCOUNTER — OFFICE VISIT (OUTPATIENT)
Dept: PHYSICAL MEDICINE AND REHAB | Age: 62
End: 2022-09-26
Payer: MEDICARE

## 2022-09-26 ENCOUNTER — TELEPHONE (OUTPATIENT)
Dept: PHYSICAL MEDICINE AND REHAB | Age: 62
End: 2022-09-26

## 2022-09-26 VITALS
DIASTOLIC BLOOD PRESSURE: 64 MMHG | SYSTOLIC BLOOD PRESSURE: 118 MMHG | HEIGHT: 66 IN | WEIGHT: 162 LBS | BODY MASS INDEX: 26.03 KG/M2

## 2022-09-26 DIAGNOSIS — M48.061 NEUROFORAMINAL STENOSIS OF LUMBAR SPINE: ICD-10-CM

## 2022-09-26 DIAGNOSIS — M54.17 LUMBOSACRAL RADICULITIS: ICD-10-CM

## 2022-09-26 DIAGNOSIS — M47.816 LUMBAR SPONDYLOSIS: Primary | ICD-10-CM

## 2022-09-26 DIAGNOSIS — M47.812 SPONDYLOSIS OF CERVICAL REGION WITHOUT MYELOPATHY OR RADICULOPATHY: ICD-10-CM

## 2022-09-26 DIAGNOSIS — G89.4 CHRONIC PAIN SYNDROME: ICD-10-CM

## 2022-09-26 DIAGNOSIS — M54.2 NECK PAIN: ICD-10-CM

## 2022-09-26 PROCEDURE — G8419 CALC BMI OUT NRM PARAM NOF/U: HCPCS | Performed by: NURSE PRACTITIONER

## 2022-09-26 PROCEDURE — G8427 DOCREV CUR MEDS BY ELIG CLIN: HCPCS | Performed by: NURSE PRACTITIONER

## 2022-09-26 PROCEDURE — 3017F COLORECTAL CA SCREEN DOC REV: CPT | Performed by: NURSE PRACTITIONER

## 2022-09-26 PROCEDURE — 4004F PT TOBACCO SCREEN RCVD TLK: CPT | Performed by: NURSE PRACTITIONER

## 2022-09-26 PROCEDURE — 99214 OFFICE O/P EST MOD 30 MIN: CPT | Performed by: NURSE PRACTITIONER

## 2022-09-26 RX ORDER — PREDNISONE 20 MG/1
TABLET ORAL
COMMUNITY
Start: 2022-09-23

## 2022-09-26 RX ORDER — ROSUVASTATIN CALCIUM 5 MG/1
TABLET, COATED ORAL
COMMUNITY
Start: 2022-08-03

## 2022-09-26 ASSESSMENT — ENCOUNTER SYMPTOMS
BACK PAIN: 1
RESPIRATORY NEGATIVE: 1

## 2022-09-26 NOTE — PROGRESS NOTES
[facility] [place] [address]  Dept: [phone]  Dept Fax: [phone]: [phone]    Visit Date: 9/26/2022    Functionality Assessment/Goals Worksheet     On a scale of 0 (Does not Interfere) to 10 (Completely Interferes)     1. Which number describes how during the past week pain has interfered with       the following:  A. General Activity:  10  B. Mood: 7  C. Walking Ability:  10  D. Normal Work (Includes both work outside the home and housework):  10  E. Relations with Other People:   6  F. Sleep:   8  G. Enjoyment of Life:   9    2. Patient Prefers to Take their Pain Medications:     [x]  On a regular basis   []  Only when necessary    []  Does not take pain medications    3. What are the Patient's Goals/Expectations for Visiting Pain Management? []  Learn about my pain    []  Receive Medication   []  Physical Therapy     []  Treat Depression   []  Receive Injections    []  Treat Sleep   []  Deal with Anxiety and Stress   []  Treat Opoid Dependence/Addiction   []  Other:      HPI:   Silvia Dial is a 57 y.o. female is here today for    Chief Complaint: Low back pain     HPI   4.5 month FU. Patient is here with complaints of increased pain in low back taht has been increasing the last 1-2 months as previous relief from L-facet RFA is waning. States pain is increasing with time. Pain is in low back - described as \"raw, throbbing, aching\" increased with walking and activity. \"I have been limited this month. Pain intermittently in buttocks and down down posterior legs- throbbing and tingling. L-facet RFA helped this as well. Pain is most bothersome in low back.      Neck pain remains tolerable from C-facet RFA   Recently received prednisone from pcp d/t flare-up of pain last week   Continues Robaxin for muscle spasms   Pain increases with bending, lifting, twisting , walking, standing, sitting, getting up and down, and housework or working at job, weather changes         Medications reviewed. Patient denies side effects with medications. Patient states she is taking medications as prescribed. Shedenies receiving pain medications from other sources. She denies any ER visits since last visit. Pain scale with out pain medications or at its worst is 7-8/10. The patientis allergic to nsaids, pcn [penicillins], and medrol [methylprednisolone]. Subjective:      Review of Systems   Constitutional: Negative. Respiratory: Negative. Cardiovascular: Negative. Musculoskeletal:  Positive for arthralgias, back pain, gait problem, myalgias, neck pain and neck stiffness. Negative for joint swelling. No assist devices    Neurological:  Positive for weakness and numbness. Negative for headaches. Psychiatric/Behavioral:  Negative for sleep disturbance. Objective:     Vitals:    09/26/22 0953   BP: 118/64   Weight: 162 lb (73.5 kg)   Height: 5' 6\" (1.676 m)       Physical Exam  Vitals and nursing note reviewed. Constitutional:       General: She is not in acute distress. Appearance: She is well-developed. She is not diaphoretic. HENT:      Head: Normocephalic and atraumatic. Right Ear: External ear normal.      Left Ear: External ear normal.      Nose: Nose normal.      Mouth/Throat:      Pharynx: No oropharyngeal exudate. Eyes:      General: No scleral icterus. Right eye: No discharge. Left eye: No discharge. Conjunctiva/sclera: Conjunctivae normal.      Pupils: Pupils are equal, round, and reactive to light. Neck:      Thyroid: No thyromegaly. Cardiovascular:      Rate and Rhythm: Normal rate and regular rhythm. Heart sounds: Normal heart sounds. No murmur heard. No friction rub. No gallop. Pulmonary:      Effort: Pulmonary effort is normal. No respiratory distress.       Breath sounds: Normal breath sounds. No wheezing or rales. Chest:      Chest wall: No tenderness. Abdominal:      General: Bowel sounds are normal. There is no distension. Palpations: Abdomen is soft. Tenderness: There is no abdominal tenderness. There is no guarding or rebound. Musculoskeletal:         General: Tenderness present. Cervical back: Rigidity, spasms, tenderness and bony tenderness present. No edema or erythema. No muscular tenderness. Decreased range of motion. Thoracic back: Tenderness present. Lumbar back: Tenderness and bony tenderness present. Decreased range of motion. Positive right straight leg raise test. Negative left straight leg raise test.        Back:    Skin:     General: Skin is warm. Coloration: Skin is not pale. Findings: No erythema or rash. Neurological:      Mental Status: She is alert and oriented to person, place, and time. She is not disoriented. Cranial Nerves: No cranial nerve deficit. Sensory: No sensory deficit. Motor: Weakness present. No atrophy or abnormal muscle tone. Coordination: Coordination normal.      Gait: Gait normal.      Deep Tendon Reflexes: Reflexes are normal and symmetric. Babinski sign absent on the right side. Babinski sign absent on the left side. Psychiatric:         Attention and Perception: Attention normal. She is attentive. Mood and Affect: Mood normal. Mood is not anxious or depressed. Affect is not labile, blunt, angry or inappropriate. Speech: Speech normal. She is communicative. Speech is not rapid and pressured, delayed, slurred or tangential.         Behavior: Behavior normal. Behavior is not agitated, slowed, aggressive, withdrawn, hyperactive or combative. Behavior is cooperative. Thought Content: Thought content normal. Thought content is not paranoid or delusional. Thought content does not include homicidal or suicidal ideation.  Thought content does not include homicidal or suicidal plan. Cognition and Memory: Cognition normal. Memory is not impaired. She does not exhibit impaired recent memory or impaired remote memory. Judgment: Judgment normal. Judgment is not impulsive or inappropriate. BETHANY  Patricks test  positive  Yeoman's  or Gaenslen's positive       Assessment:     1. Lumbar spondylosis    2. Neuroforaminal stenosis of lumbar spine    3. Lumbosacral radiculitis    4. Spondylosis of cervical region without myelopathy or radiculopathy    5. Neck pain    6. Chronic pain syndrome            Plan:      OARRS reviewed. Current MED: 0  Patient was not offered naloxone for home. Discussed long term side effects of medications, tolerance, dependency and addiction. Previous UDS reviewed  UDS preformed today for compliance. Patient told can not receive any pain medications from any other source. No evidence of abuse, diversion or aberrant behavior. Medications and/or procedures to improve function and quality of life- patient understanding with this and that may not be pain free  Discussed with patient about safe storage of medications at home  Discussed possible weaning of medication dosing dependent on treatment/procedure results. Discussed with patient about risks with procedure including infection, reaction to medication, increased pain, or bleeding. Procedure notes reviewed in detail   Received over 85% relief of low back pain and leg pain for over a year from previous L-facet RFA. Plan Bilateral L-facet RFA @ L4-5 and L5-S1 for longer term pain relief. Procedure discussed in detail   Needs to hold baby aspirin and Ibuprofen   Neck pain remains tolerable- continues to receive over 85% relief from C-facet RFA       Meds. Prescribed:   No orders of the defined types were placed in this encounter. Return for Bilateral L-facet RFA @ L4-5 and L5-S1. , follow up after procedure.                Electronically signed by JAIME Link - JORGE L on9/26/2022 at 10:26 AM

## 2022-09-29 ENCOUNTER — PREP FOR PROCEDURE (OUTPATIENT)
Dept: PHYSICAL MEDICINE AND REHAB | Age: 62
End: 2022-09-29

## 2022-10-03 NOTE — DISCHARGE INSTRUCTIONS
ANESTHESIA INSTRUCTIONS FOLLOWING SURGERY        Since you may experience some intermittent light-headedness for the next several hours, we suggest you plan on bed rest or quiet relaxation this evening. You must have a friend or relative stay with you tonight. Because of the sedation you have received, it is recommended that you do not drive a motor vehicle, operate any kind of machinery, or sign any contractual agreement for 24 hours following the procedure. You should not take alcoholic beverages tonight and only take sleeping medication that has been specifically prescribed for you by your physician. Call office [phone] if you have:  Temperature greater than 100.4  Persistent nausea and vomiting  Severe uncontrolled pain  Redness, tenderness, or signs of infection (pain, swelling, redness, odor or green/yellow discharge around the site)  Difficulty breathing, headache or visual disturbances  Hives  Persistent dizziness or light-headedness  Extreme fatigue  Any other questions or concerns you may have after discharge    In an emergency, call 911 or go to an Emergency Department at a nearby hospital    It is important to bring a complete, current list of your medications to any medical appointments or hospitalizations. REMINDER:   Carry a list of your medications and allergies with you at all times  Call your pharmacy at least 1 week in advance to refill prescriptions    Diet: Resume your usual diet. Good nutrition promotes healing. Increase fluid intake. Activity: Rest for 24 hrs then resume normal activity. HOME MEDICATIONS:      If on blood thinning products such as; Aspirin, NSAIDS, Plavix, Coumadin, Xarelto, Fish Oil, Multi-Vitamins or Herbal Supplements restart in 24 hours      Restart Metformin in 48 hours if you had procedure with dye. Restart Metformin in 24 hours if no dye used during procedure.         Education Materials Received: {yes/no:614225}  Belongings Returned: {yes/no:555167}          I understand and acknowledge receipt of the above instructions. Patient or Guardian Signature                                                         Date/Time                                                                                                                                            Physician's or R.N.'s Signature                                                                  Date/Time      The discharge instructions have been reviewed with the patient and/or Guardian. Patient and/or Guardian signed and retained a printed copy. Call office [phone] if you have:  Temperature greater than 100.4  Persistent nausea and vomiting  Severe uncontrolled pain  Redness, tenderness, or signs of infection (pain, swelling, redness, odor or green/yellow discharge around the site)  Difficulty breathing, headache or visual disturbances  Hives  Persistent dizziness or light-headedness  Extreme fatigue  Any other questions or concerns you may have after discharge    In an emergency, call 911 or go to an Emergency Department at a nearby hospital    It is important to bring a complete, current list of your medications to any medical appointments or hospitalizations. REMINDER:   Carry a list of your medications and allergies with you at all times  Call your pharmacy at least 1 week in advance to refill prescriptions    Diet: Resume your usual diet. Good nutrition promotes healing. Increase fluid intake. Activity: Rest for 24 hrs then resume normal activity. Education Materials Received: {yes/no:978662}  Belongings Returned: {yes/no:[id number]}          I understand and acknowledge receipt of the above instructions. Patient or Guardian Signature                                                         Date/Time                                                                                                                                            Physician's or R.N.'s Signature                                                                  Date/Time      The discharge instructions have been reviewed with the patient and/or Guardian. Patient and/or Guardian signed and retained a printed copy.

## 2022-10-04 ENCOUNTER — APPOINTMENT (OUTPATIENT)
Dept: GENERAL RADIOLOGY | Age: 62
End: 2022-10-04
Attending: PAIN MEDICINE
Payer: MEDICARE

## 2022-10-04 ENCOUNTER — HOSPITAL ENCOUNTER (OUTPATIENT)
Age: 62
Setting detail: OUTPATIENT SURGERY
Discharge: HOME | End: 2022-10-04
Attending: PAIN MEDICINE | Admitting: PAIN MEDICINE
Payer: MEDICARE

## 2022-10-04 ENCOUNTER — ANESTHESIA (OUTPATIENT)
Dept: OPERATING ROOM | Age: 62
End: 2022-10-04
Payer: MEDICARE

## 2022-10-04 ENCOUNTER — ANESTHESIA EVENT (OUTPATIENT)
Dept: OPERATING ROOM | Age: 62
End: 2022-10-04
Payer: MEDICARE

## 2022-10-04 VITALS
WEIGHT: 163 LBS | RESPIRATION RATE: 15 BRPM | SYSTOLIC BLOOD PRESSURE: 109 MMHG | DIASTOLIC BLOOD PRESSURE: 74 MMHG | OXYGEN SATURATION: 98 % | HEART RATE: 87 BPM | TEMPERATURE: 97 F | BODY MASS INDEX: 26.2 KG/M2 | HEIGHT: 66 IN

## 2022-10-04 PROCEDURE — 3209999900 FLUORO FOR SURGICAL PROCEDURES

## 2022-10-04 PROCEDURE — 64635 DESTROY LUMB/SAC FACET JNT: CPT | Performed by: PAIN MEDICINE

## 2022-10-04 PROCEDURE — 2709999900 HC NON-CHARGEABLE SUPPLY: Performed by: PAIN MEDICINE

## 2022-10-04 PROCEDURE — 3700000001 HC ADD 15 MINUTES (ANESTHESIA): Performed by: PAIN MEDICINE

## 2022-10-04 PROCEDURE — 3600000056 HC PAIN LEVEL 4 BASE: Performed by: PAIN MEDICINE

## 2022-10-04 PROCEDURE — 7100000011 HC PHASE II RECOVERY - ADDTL 15 MIN: Performed by: PAIN MEDICINE

## 2022-10-04 PROCEDURE — 7100000010 HC PHASE II RECOVERY - FIRST 15 MIN: Performed by: PAIN MEDICINE

## 2022-10-04 PROCEDURE — 64636 DESTROY L/S FACET JNT ADDL: CPT | Performed by: PAIN MEDICINE

## 2022-10-04 PROCEDURE — 3700000000 HC ANESTHESIA ATTENDED CARE: Performed by: PAIN MEDICINE

## 2022-10-04 PROCEDURE — 3600000057 HC PAIN LEVEL 4 ADDL 15 MIN: Performed by: PAIN MEDICINE

## 2022-10-04 PROCEDURE — 2500000003 HC RX 250 WO HCPCS: Performed by: PAIN MEDICINE

## 2022-10-04 PROCEDURE — 6360000002 HC RX W HCPCS: Performed by: NURSE ANESTHETIST, CERTIFIED REGISTERED

## 2022-10-04 PROCEDURE — 2500000003 HC RX 250 WO HCPCS: Performed by: NURSE ANESTHETIST, CERTIFIED REGISTERED

## 2022-10-04 RX ORDER — LIDOCAINE HYDROCHLORIDE 10 MG/ML
INJECTION, SOLUTION EPIDURAL; INFILTRATION; INTRACAUDAL; PERINEURAL PRN
Status: DISCONTINUED | OUTPATIENT
Start: 2022-10-04 | End: 2022-10-04 | Stop reason: ALTCHOICE

## 2022-10-04 RX ORDER — BUPIVACAINE HYDROCHLORIDE 2.5 MG/ML
INJECTION, SOLUTION EPIDURAL; INFILTRATION; INTRACAUDAL PRN
Status: DISCONTINUED | OUTPATIENT
Start: 2022-10-04 | End: 2022-10-04 | Stop reason: ALTCHOICE

## 2022-10-04 RX ORDER — PROPOFOL 10 MG/ML
INJECTION, EMULSION INTRAVENOUS PRN
Status: DISCONTINUED | OUTPATIENT
Start: 2022-10-04 | End: 2022-10-04 | Stop reason: SDUPTHER

## 2022-10-04 RX ORDER — LIDOCAINE HYDROCHLORIDE 10 MG/ML
INJECTION, SOLUTION INFILTRATION; PERINEURAL PRN
Status: DISCONTINUED | OUTPATIENT
Start: 2022-10-04 | End: 2022-10-04 | Stop reason: SDUPTHER

## 2022-10-04 RX ORDER — FENTANYL CITRATE 50 UG/ML
INJECTION, SOLUTION INTRAMUSCULAR; INTRAVENOUS PRN
Status: DISCONTINUED | OUTPATIENT
Start: 2022-10-04 | End: 2022-10-04 | Stop reason: SDUPTHER

## 2022-10-04 RX ADMIN — LIDOCAINE HYDROCHLORIDE 50 MG: 10 INJECTION, SOLUTION INFILTRATION; PERINEURAL at 08:27

## 2022-10-04 RX ADMIN — PROPOFOL 50 MG: 10 INJECTION, EMULSION INTRAVENOUS at 08:36

## 2022-10-04 RX ADMIN — PROPOFOL 50 MG: 10 INJECTION, EMULSION INTRAVENOUS at 08:38

## 2022-10-04 RX ADMIN — FENTANYL CITRATE 100 MCG: 50 INJECTION, SOLUTION INTRAMUSCULAR; INTRAVENOUS at 08:25

## 2022-10-04 ASSESSMENT — PAIN - FUNCTIONAL ASSESSMENT: PAIN_FUNCTIONAL_ASSESSMENT: 0-10

## 2022-10-04 NOTE — ANESTHESIA PRE PROCEDURE
Department of Anesthesiology  Preprocedure Note       Name:  Dhara Greene   Age:  57 y.o.  :  1960                                          MRN:  [id number]         Date:  10/4/2022      Surgeon: Jose Valdez):  Yenny Tellez MD    Procedure: Procedure(s):  Bilateral Lumar Facet Radiofrequency Ablation Lumbar 4-5, 5-Sacral1    Medications prior to admission:   Prior to Admission medications    Medication Sig Start Date End Date Taking? Authorizing Provider   predniSONE (DELTASONE) 20 MG tablet TAKE 1 TABLET BY MOUTH TWICE DAILY 22   Historical Provider, MD   rosuvastatin (CRESTOR) 5 MG tablet take 1 tablet by mouth once daily 8/3/22   Historical Provider, MD   methocarbamol (ROBAXIN) 750 MG tablet Take 750 mg by mouth 4 times daily    Historical Provider, MD   loratadine (CLARITIN) 10 MG tablet Take 10 mg by mouth daily    Historical Provider, MD   aspirin 81 MG tablet Take 81 mg by mouth daily    Historical Provider, MD   dilTIAZem (CARDIZEM) 30 MG tablet take 1 tablet by mouth twice a day 20   Historical Provider, MD   lisinopril (PRINIVIL;ZESTRIL) 5 MG tablet  20   Historical Provider, MD   simvastatin (ZOCOR) 20 MG tablet take 1 tablet by mouth every evening 20   Historical Provider, MD   cyclobenzaprine (FLEXERIL) 10 MG tablet MAY TAKE 1 ONE TABLET EVERY 8 HOURS AS NEEDED FOR MUSCLE PAIN 20   Historical Provider, MD ANSARI VITAMIN D-3 125 MCG (5000 UT) CAPS capsule take 1 capsule by mouth once daily WITH SUPPER 20   Historical Provider, MD   calcium carbonate 1500 (600 Ca) MG TABS tablet  20   Historical Provider, MD   albuterol sulfate  (90 Base) MCG/ACT inhaler inhale 2 puffs by mouth every 4 hours if needed for SPASMODIC COU. ..  (REFER TO PRESCRIPTION NOTES).  20   Historical Provider, MD   ibuprofen (ADVIL;MOTRIN) 800 MG tablet Take 1 tablet by mouth 3 times daily 16   Historical Provider, MD   amitriptyline (ELAVIL) 50 MG tablet Take 1 tablet by mouth daily 8/29/16   Historical Provider, MD   Ascorbic Acid (VITAMIN C) 500 MG tablet Take by mouth daily  Patient not taking: Reported on 10/4/2022    Historical Provider, MD   VITAMIN D, CHOLECALCIFEROL, PO Take 1 tablet by mouth daily    Historical Provider, MD       Current medications:    No current outpatient medications on file. No current facility-administered medications for this visit. Allergies: Allergies   Allergen Reactions    Nsaids Other (See Comments)     STOPPED BREATHING    Pcn [Penicillins] Other (See Comments)     DIZZINESS    Medrol [Methylprednisolone] Palpitations       Problem List:    Patient Active Problem List   Diagnosis Code    PVD (peripheral vascular disease) ([facility] I73.9    Lumbar spondylosis M47.816    Cervical spondylosis M47.812       Past Medical History:        Diagnosis Date    Anxiety     Chronic back pain     COPD (chronic obstructive pulmonary disease) ([facility]     Depression     Disc disorder     [facility]     Fibromyalgia     Hyperlipidemia     Hypertension     Osteoarthritis     Osteoporosis        Past Surgical History:        Procedure Laterality Date    FACET JOINT INJECTION Bilateral 5/14/2020    bilat.  L3, L4 medial branch and L5 dorsal rami injection performed by Kolby Marshall MD at [facility] 9 Bilateral 6/17/2020    bilateral C5-6-7 medial branch block performed by Kolby Marshall MD at [facility] INJECTION Bilateral 1/20/2021    bilateral C5-6-7 medial branch block performed by Kolby Marshall MD at [facility] INJECTION Bilateral 12/21/2021    bilateral C-facet MBB # 2 @ C5-6, and C6-7 for diagnostic purpose performed by Lazaro Lloyd MD at [facility] INJECTION Bilateral 2/11/2022    bilateral C-facet RFA @ C5-6, and C6-7. performed by Lazaro Lloyd MD at [address] [place] [address]  KNEE ARTHROSCOPY Bilateral 2019    PAIN MANAGEMENT PROCEDURE Bilateral 7/22/2020    bilateral L3-4 medial branch L5 dorsal rami performed by Qasim Solis MD at [facility] Right 10/7/2020    RFA, L3-4 medial branch and L5 dorsal rami, right. performed by Qasim Solis MD at [facility] Left 11/4/2020    RFA, L3-4 medial branch and L5 dorsal rami, left. performed by Qasim Solis MD at [facility] Left 6/8/2021    L-facet RFA's @ L3-4,L4-5 and L5-S1  Left performed by Jolynn Welsh MD at [facility] Right 7/27/2021    right L-facet RFA @ L3-4, L4-5, and L5-S1 performed by Jolynn Welsh MD at [address] [place] [address]       Social History:    Social History     Tobacco Use    Smoking status: Every Day     Packs/day: 1.00     Types: Cigarettes    Smokeless tobacco: Never   Substance Use Topics    Alcohol use: Yes     Comment: 2 BEERS A MONTH                                 Ready to quit: Not Answered  Counseling given: Not Answered      Vital Signs (Current): There were no vitals filed for this visit.                                            BP Readings from Last 3 Encounters:   10/04/22 (!) 140/81   09/26/22 118/64   05/11/22 116/82       NPO Status:                                                                                 BMI:   Wt Readings from Last 3 Encounters:   10/04/22 163 lb (73.9 kg)   09/26/22 162 lb (73.5 kg)   05/11/22 162 lb (73.5 kg)     There is no height or weight on file to calculate BMI.    CBC: No results found for: WBC, RBC, HGB, HCT, MCV, RDW, PLT    CMP: No results found for: NA, K, CL, CO2, BUN, CREATININE, GFRAA, AGRATIO, LABGLOM, GLUCOSE, GLU, PROT, CALCIUM, BILITOT, ALKPHOS, AST, ALT    POC Tests: No results for input(s): POCGLU, POCNA, POCK, POCCL, Frandy Rodriguez, POCHCT in the last 72 hours. Coags: No results found for: PROTIME, INR, APTT    HCG (If Applicable): No results found for: PREGTESTUR, PREGSERUM, HCG, HCGQUANT     ABGs: No results found for: PHART, PO2ART, BLR6TCC, KAI1QDT, BEART, M2PEQFOB     Type & Screen (If Applicable):  No results found for: LABABO, LABRH    Drug/Infectious Status (If Applicable):  No results found for: HIV, HEPCAB    COVID-19 Screening (If Applicable):   Lab Results   Component Value Date/Time    COVID19 Not Detected 07/17/2020 10:27 AM           Anesthesia Evaluation  Patient summary reviewed  Airway: Mallampati: III  TM distance: >3 FB   Neck ROM: full  Mouth opening: > = 3 FB   Dental:          Pulmonary:normal exam    (+) COPD:                             Cardiovascular:  Exercise tolerance: good (>4 METS),   (+) hypertension:,                   Neuro/Psych:   (+) neuromuscular disease:, psychiatric history:            GI/Hepatic/Renal:             Endo/Other:    (+) : arthritis:., .          Pt had no PAT visit       Abdominal:             Vascular: Other Findings:             Anesthesia Plan      MAC     ASA 3       Induction: intravenous. Anesthetic plan and risks discussed with patient. Plan discussed with CRNA.                     Katia Dunn MD   10/4/2022

## 2022-10-04 NOTE — PROGRESS NOTES
2241: Patient to phase II. [facility] [address] [place] [address]. Patient awake and oriented. Denies nausea and vomiting and pain. Vital signs obtained. Cart in lowest position and locked. Patient given call light. 0845: patient given snack and drink. 8596: Pt states son is about 15 minutes away. Attempted to contact at this time, but no answer. 1004: patient able to make contact with son. Patient tolerating snack and drink. IV removed and patient getting dressed. 6692: patient meets discharge criteria. Patient ambulated to car passenger seat in stable condition with this RN by side.

## 2022-10-04 NOTE — ANESTHESIA POSTPROCEDURE EVALUATION
Department of Anesthesiology  Postprocedure Note    Patient: Santi Tolentino  MRN: [id number]  YOB: 1960  Date of evaluation: 10/4/2022      Procedure Summary     Date: 10/04/22 Room / Location: [address] [place] [address] 03 / [address] [facility]    Anesthesia Start: 2309 Anesthesia Stop: 2447    Procedure: Bilateral Lumar Facet Radiofrequency Ablation Lumbar 4-5, 5-Sacral1 (Bilateral) Diagnosis:       Lumbar spondylosis      (Lumbar spondylosis)    Surgeons: Piotr Hughes MD Responsible Provider: Ame Sheikh MD    Anesthesia Type: MAC ASA Status: 3          Anesthesia Type: No value filed.     Kimberlee Phase I:      Kimberlee Phase II: Kimberlee Score: 10      Anesthesia Post Evaluation    Patient location during evaluation: PACU  Patient participation: complete - patient participated  Level of consciousness: awake and alert  Airway patency: patent  Nausea & Vomiting: no nausea  Complications: no  Cardiovascular status: blood pressure returned to baseline and hemodynamically stable  Respiratory status: acceptable and spontaneous ventilation  Hydration status: euvolemic

## 2022-10-04 NOTE — OP NOTE
Pre-Procedure Note    Patient Name: Pedro Luis Rivera   YOB: 1960  Medical Record Number: [id number]  Date: 10/4/22    Indication:  Lower back pain    Consent: On file. Vital Signs:   Vitals:    10/04/22 0705   BP: (!) 140/81   Pulse: 93   Resp: 16   Temp: 97.4 °F (36.3 °C)   SpO2: 99%       Past Medical History:   has a past medical history of Anxiety, Chronic back pain, COPD (chronic obstructive pulmonary disease) ([facility] [address] Depression, Disc disorder, Emphysema of lung ([facility] [address] Fibromyalgia, Hyperlipidemia, Hypertension, Osteoarthritis, and Osteoporosis. Past Surgical History:   has a past surgical history that includes Tonsillectomy (1972); Knee arthroscopy (Bilateral, 2019); Facet joint injection (Bilateral, 5/14/2020); Facet joint injection (Bilateral, 6/17/2020); Pain management procedure (Bilateral, 7/22/2020); Pain management procedure (Right, 10/7/2020); Pain management procedure (Left, 11/4/2020); Facet joint injection (Bilateral, 1/20/2021); Pain management procedure (Left, 6/8/2021); Pain management procedure (Right, 7/27/2021); Facet joint injection (Bilateral, 12/21/2021); and Facet joint injection (Bilateral, 2/11/2022). Pre-Sedation Documentation and Exam:   Vital signs have been reviewed (see flow sheet for vitals). Sedation/ Anesthesia Plan:   MAC    Patient is an appropriate candidate for plan of sedation: yes      Preoperative Diagnosis:  L-spondylosis     Post-Op Dx: as above     Procedure Performed:  :Radiofrequency ablation of median branches at the levels of L4-5 and L5-S1 bilateral under fluoroscopic guidance      Indication for the Procedure: The patient has ahistory of chronic low back pain that is not responding well to the conservative treatment. Patient's pain is mostly axial in nature. Pain is interfering with the activities of daily living. Physical examination revealed facet tenderness and facet loading is positive.   Patient had undergone lumbar facet joint injections with pain relief that lasted for only a short period of time and had greater than 70% pain relief with confirmatory median branch blocks. Hence we decided to do radiofrequency abalation of median branches for long term pain releif. The procedure and risks  were discussed with the patient and an informed consent was obtained. Procedure:     A meaningful communication was kept up with the patient throughout the procedure. The patient is placed in prone position and skin over the back was prepped and draped in sterile manner. Then using fluoroscopy the junction of the transverse process of the vertebra with the superior process of the facet joint was observed and the view was optimized. The skin and deep tissues posterior were infiltrated with 20 ml of  1% xylocaine. The RF canula with the 10 mm active tip was introduced through the skin wheal under fluoroscopy guidance such that the tip of the needle lies in the groove of the transverse process with the superior processes of the facet joint. Then a lateral view of the lumbar spine was obtained to make sure the tip of needle is not in the neural foramen. Then electric impedence was checked to make sure it is acceptable. Then a sensory stimulus was applied at 50 Hz up to 1 volt and concordant pain symptoms were reproduced. Then a motor stimulus was applied at 2 Hz up to 2 volts and no motor stimulation was seen in lower extremities. Some multifidus stimulus was seen. Then after negative aspiration 0.25%  Marcaine 4 cc was injected through the needle. Then a  lesion was done at 80 degrees centigrade for 90 seconds. EBL-0  For L5 median branch block the junction of the ala of  the sacrum with the superior articular process of the facet joint was taken as a reference point.   For the L4 median branch the junction of the transverse process of L5 with the superolateral possible facet joint was taken as a reference point and for S1 median branch the most lateral and superior aspect of S1 foramina was taken as a reference point,. Patient's vital signs and neurological status remained stable throughout the procedure and post procedural period.   The patient tolerated the procedure well and was discharged home in stable condition          Electronically signed by Bryce Rodriguez MD on 10/4/22 at 8:24 AM EDT

## 2022-10-04 NOTE — H&P
[address] [address]  History and Physical Update    Pt Name: Marquita Goodrich  MRN: [id number]  YOB: 1960  Date of evaluation: 10/4/2022      I have examined the patient and reviewed the H&P/Consult and there are no changes to the patient or plans.         Electronically signed by Danial Laws MD on 10/4/2022 at 8:15 AM

## 2022-10-25 ENCOUNTER — OFFICE VISIT (OUTPATIENT)
Dept: PHYSICAL MEDICINE AND REHAB | Age: 62
End: 2022-10-25
Payer: MEDICARE

## 2022-10-25 ENCOUNTER — HOSPITAL ENCOUNTER (OUTPATIENT)
Dept: GENERAL RADIOLOGY | Age: 62
Discharge: HOME | End: 2022-10-25
Payer: MEDICARE

## 2022-10-25 ENCOUNTER — HOSPITAL ENCOUNTER (OUTPATIENT)
Age: 62
Discharge: HOME | End: 2022-10-25
Payer: MEDICARE

## 2022-10-25 VITALS
HEIGHT: 66 IN | SYSTOLIC BLOOD PRESSURE: 108 MMHG | DIASTOLIC BLOOD PRESSURE: 70 MMHG | BODY MASS INDEX: 26.2 KG/M2 | WEIGHT: 163 LBS

## 2022-10-25 DIAGNOSIS — M54.2 NECK PAIN: ICD-10-CM

## 2022-10-25 DIAGNOSIS — M47.812 SPONDYLOSIS OF CERVICAL REGION WITHOUT MYELOPATHY OR RADICULOPATHY: ICD-10-CM

## 2022-10-25 DIAGNOSIS — G89.4 CHRONIC PAIN SYNDROME: ICD-10-CM

## 2022-10-25 DIAGNOSIS — M48.061 NEUROFORAMINAL STENOSIS OF LUMBAR SPINE: ICD-10-CM

## 2022-10-25 DIAGNOSIS — M47.816 LUMBAR SPONDYLOSIS: ICD-10-CM

## 2022-10-25 DIAGNOSIS — G89.29 CHRONIC MYOFASCIAL PAIN: ICD-10-CM

## 2022-10-25 DIAGNOSIS — M79.18 CHRONIC MYOFASCIAL PAIN: ICD-10-CM

## 2022-10-25 DIAGNOSIS — M54.2 NECK PAIN: Primary | ICD-10-CM

## 2022-10-25 DIAGNOSIS — M54.17 LUMBOSACRAL RADICULITIS: ICD-10-CM

## 2022-10-25 PROCEDURE — G8427 DOCREV CUR MEDS BY ELIG CLIN: HCPCS | Performed by: NURSE PRACTITIONER

## 2022-10-25 PROCEDURE — 3017F COLORECTAL CA SCREEN DOC REV: CPT | Performed by: NURSE PRACTITIONER

## 2022-10-25 PROCEDURE — 99214 OFFICE O/P EST MOD 30 MIN: CPT | Performed by: NURSE PRACTITIONER

## 2022-10-25 PROCEDURE — 72040 X-RAY EXAM NECK SPINE 2-3 VW: CPT

## 2022-10-25 PROCEDURE — G8419 CALC BMI OUT NRM PARAM NOF/U: HCPCS | Performed by: NURSE PRACTITIONER

## 2022-10-25 PROCEDURE — G8484 FLU IMMUNIZE NO ADMIN: HCPCS | Performed by: NURSE PRACTITIONER

## 2022-10-25 PROCEDURE — 4004F PT TOBACCO SCREEN RCVD TLK: CPT | Performed by: NURSE PRACTITIONER

## 2022-10-25 ASSESSMENT — ENCOUNTER SYMPTOMS
RESPIRATORY NEGATIVE: 1
BACK PAIN: 0

## 2022-10-25 NOTE — PROGRESS NOTES
[address] [facility] [address]  Dept: [phone]  Dept Fax: [phone]: [phone]    Visit Date: 10/25/2022    Functionality Assessment/Goals Worksheet     On a scale of 0 (Does not Interfere) to 10 (Completely Interferes)     1. Which number describes how during the past week pain has interfered with       the following:  A. General Activity:  3  B. Mood: 3  C. Walking Ability:  3  D. Normal Work (Includes both work outside the home and housework):  3  E. Relations with Other People:   3  F. Sleep:   3  G. Enjoyment of Life:   3    2. Patient Prefers to Take their Pain Medications:     []  On a regular basis   [x]  Only when necessary    []  Does not take pain medications    3. What are the Patient's Goals/Expectations for Visiting Pain Management? []  Learn about my pain    [x]  Receive Medication   []  Physical Therapy     []  Treat Depression   [x]  Receive Injections    []  Treat Sleep   []  Deal with Anxiety and Stress   []  Treat Opoid Dependence/Addiction   []  Other:      HPI:   Marquita Goodrich is a 57 y.o. female is here today for    Chief Complaint: Low back pain, Neck pain     HPI   Fu from bilateral L-facet RFA from 10/4/2022. Receiving over 95% relief of low back pain from this procedure. Low back pain is much better \"I can do all my daily chores and stand longer\". This procedure is helping pain as well- not having any numbness or throbbing. Lately posterior neck pain has been increasing. Still receiving relief from C-facet RFA but having more constant aching, stiff and spasms. Feels maybe a lot of muscle pain  Still not having a  lot of headaches. Continues Robaxin for muscle spasms which is helping   Pain increases with bending, lifting, twisting , turning head, getting up and down, and housework or working at job. Medications reviewed.  Patient denies side effects with medications. Patient states she is taking medications as prescribed. Shedenies receiving pain medications from other sources. She denies any ER visits since last visit. Pain scale with out pain medications or at its worst is 7/10 neck, low back 1/10       The patientis allergic to nsaids, pcn [penicillins], and medrol [methylprednisolone]. Subjective:      Review of Systems   Constitutional: Negative. Respiratory: Negative. Cardiovascular: Negative. Musculoskeletal:  Positive for arthralgias, gait problem, myalgias, neck pain and neck stiffness. Negative for back pain and joint swelling. No assist devices    Neurological:  Positive for weakness. Negative for numbness and headaches. Psychiatric/Behavioral:  Negative for sleep disturbance. Objective:     Vitals:    10/25/22 0746   BP: 108/70   Weight: 163 lb (73.9 kg)   Height: 5' 6\" (1.676 m)       Physical Exam  Vitals and nursing note reviewed. Constitutional:       General: She is not in acute distress. Appearance: She is well-developed. She is not diaphoretic. HENT:      Head: Normocephalic and atraumatic. Right Ear: External ear normal.      Left Ear: External ear normal.      Nose: Nose normal.      Mouth/Throat:      Pharynx: No oropharyngeal exudate. Eyes:      General: No scleral icterus. Right eye: No discharge. Left eye: No discharge. Conjunctiva/sclera: Conjunctivae normal.      Pupils: Pupils are equal, round, and reactive to light. Neck:      Thyroid: No thyromegaly. Cardiovascular:      Rate and Rhythm: Normal rate and regular rhythm. Heart sounds: Normal heart sounds. No murmur heard. No friction rub. No gallop. Pulmonary:      Effort: Pulmonary effort is normal. No respiratory distress. Breath sounds: Normal breath sounds. No wheezing or rales. Chest:      Chest wall: No tenderness.    Abdominal:      General: Bowel sounds are normal. There is no distension. Palpations: Abdomen is soft. Tenderness: There is no abdominal tenderness. There is no guarding or rebound. Musculoskeletal:         General: Tenderness present. Cervical back: Rigidity, spasms, tenderness and bony tenderness present. No edema or erythema. Pain with movement present. No muscular tenderness. Decreased range of motion. Thoracic back: Tenderness present. Lumbar back: Tenderness and bony tenderness present. Decreased range of motion. Positive right straight leg raise test. Negative left straight leg raise test.        Back:    Skin:     General: Skin is warm. Coloration: Skin is not pale. Findings: No erythema or rash. Neurological:      Mental Status: She is alert and oriented to person, place, and time. She is not disoriented. Cranial Nerves: No cranial nerve deficit. Sensory: No sensory deficit. Motor: Weakness present. No atrophy or abnormal muscle tone. Coordination: Coordination normal.      Gait: Gait abnormal.      Deep Tendon Reflexes: Reflexes are normal and symmetric. Babinski sign absent on the right side. Babinski sign absent on the left side. Psychiatric:         Attention and Perception: Attention normal. She is attentive. Mood and Affect: Mood normal. Mood is not anxious or depressed. Affect is not labile, blunt, angry or inappropriate. Speech: Speech normal. She is communicative. Speech is not rapid and pressured, delayed, slurred or tangential.         Behavior: Behavior normal. Behavior is not agitated, slowed, aggressive, withdrawn, hyperactive or combative. Behavior is cooperative. Thought Content: Thought content normal. Thought content is not paranoid or delusional. Thought content does not include homicidal or suicidal ideation. Thought content does not include homicidal or suicidal plan. Cognition and Memory: Cognition normal. Memory is not impaired.  She does not exhibit impaired recent memory or impaired remote memory. Judgment: Judgment normal. Judgment is not impulsive or inappropriate. BETHANY  Patricks test  positive  Yeoman's  or Gaenslen's positive       Assessment:     1. Neck pain    2. Spondylosis of cervical region without myelopathy or radiculopathy    3. Lumbar spondylosis    4. Neuroforaminal stenosis of lumbar spine    5. Lumbosacral radiculitis    6. Chronic pain syndrome    7. Chronic myofascial pain            Plan:      OARRS reviewed. Current MED: 0  Patient was not offered naloxone for home. Discussed long term side effects of medications, tolerance, dependency and addiction. Previous UDS reviewed  UDS preformed today for compliance. Patient told can not receive any pain medications from any other source. No evidence of abuse, diversion or aberrant behavior. Medications and/or procedures to improve function and quality of life- patient understanding with this and that may not be pain free  Discussed with patient about safe storage of medications at home  Discussed possible weaning of medication dosing dependent on treatment/procedure results. Discussed with patient about risks with procedure including infection, reaction to medication, increased pain, or bleeding. Procedure notes reviewed in detail   Receiving over 95% relief of low back pain from L-facet RFA  Main complaint now is cervical pain as C -facet RFA is waning. Discussed repeating C-facet RFA but will get updated imaging first. Ordered cervical xray and will FU in 2 weeks to review and discuss procedures. Continue Ibuprofen prn and Robaxin for muscle spasms from pcp    Meds. Prescribed:   No orders of the defined types were placed in this encounter. Return in about 2 weeks (around 11/8/2022), or if symptoms worsen or fail to improve, for To review c-xray .                Electronically signed by JAIME Landis CNP on10/25/2022 at 10:35 AM

## 2022-11-08 ENCOUNTER — HOSPITAL ENCOUNTER (OUTPATIENT)
Age: 62
Discharge: HOME | End: 2022-11-08
Payer: MEDICARE

## 2022-11-08 ENCOUNTER — HOSPITAL ENCOUNTER (OUTPATIENT)
Dept: GENERAL RADIOLOGY | Age: 62
Discharge: HOME | End: 2022-11-08
Payer: MEDICARE

## 2022-11-08 ENCOUNTER — TELEPHONE (OUTPATIENT)
Dept: PHYSICAL MEDICINE AND REHAB | Age: 62
End: 2022-11-08

## 2022-11-08 ENCOUNTER — OFFICE VISIT (OUTPATIENT)
Dept: PHYSICAL MEDICINE AND REHAB | Age: 62
End: 2022-11-08
Payer: MEDICARE

## 2022-11-08 VITALS
HEIGHT: 66 IN | DIASTOLIC BLOOD PRESSURE: 82 MMHG | BODY MASS INDEX: 26.2 KG/M2 | WEIGHT: 163 LBS | SYSTOLIC BLOOD PRESSURE: 120 MMHG

## 2022-11-08 DIAGNOSIS — M47.812 SPONDYLOSIS OF CERVICAL REGION WITHOUT MYELOPATHY OR RADICULOPATHY: Primary | ICD-10-CM

## 2022-11-08 DIAGNOSIS — M48.061 NEUROFORAMINAL STENOSIS OF LUMBAR SPINE: ICD-10-CM

## 2022-11-08 DIAGNOSIS — M47.816 LUMBAR SPONDYLOSIS: ICD-10-CM

## 2022-11-08 DIAGNOSIS — G89.4 CHRONIC PAIN SYNDROME: ICD-10-CM

## 2022-11-08 DIAGNOSIS — M54.2 NECK PAIN: ICD-10-CM

## 2022-11-08 DIAGNOSIS — M17.12 PRIMARY OSTEOARTHRITIS OF LEFT KNEE: ICD-10-CM

## 2022-11-08 DIAGNOSIS — G89.29 CHRONIC PAIN OF LEFT KNEE: ICD-10-CM

## 2022-11-08 DIAGNOSIS — G89.29 CHRONIC MYOFASCIAL PAIN: ICD-10-CM

## 2022-11-08 DIAGNOSIS — M25.562 CHRONIC PAIN OF LEFT KNEE: ICD-10-CM

## 2022-11-08 DIAGNOSIS — M79.18 CHRONIC MYOFASCIAL PAIN: ICD-10-CM

## 2022-11-08 PROCEDURE — 73562 X-RAY EXAM OF KNEE 3: CPT

## 2022-11-08 PROCEDURE — 3017F COLORECTAL CA SCREEN DOC REV: CPT | Performed by: NURSE PRACTITIONER

## 2022-11-08 PROCEDURE — G8484 FLU IMMUNIZE NO ADMIN: HCPCS | Performed by: NURSE PRACTITIONER

## 2022-11-08 PROCEDURE — 99214 OFFICE O/P EST MOD 30 MIN: CPT | Performed by: NURSE PRACTITIONER

## 2022-11-08 PROCEDURE — G8427 DOCREV CUR MEDS BY ELIG CLIN: HCPCS | Performed by: NURSE PRACTITIONER

## 2022-11-08 PROCEDURE — G8419 CALC BMI OUT NRM PARAM NOF/U: HCPCS | Performed by: NURSE PRACTITIONER

## 2022-11-08 PROCEDURE — 4004F PT TOBACCO SCREEN RCVD TLK: CPT | Performed by: NURSE PRACTITIONER

## 2022-11-08 ASSESSMENT — ENCOUNTER SYMPTOMS
BACK PAIN: 0
RESPIRATORY NEGATIVE: 1

## 2022-11-08 NOTE — PROGRESS NOTES
[address]  Dept: [phone]  Dept Fax: [phone]: [phone]    Visit Date: 11/8/2022    Functionality Assessment/Goals Worksheet     On a scale of 0 (Does not Interfere) to 10 (Completely Interferes)     1. Which number describes how during the past week pain has interfered with       the following:  A. General Activity:  8  B. Mood: 5  C. Walking Ability:  8  D. Normal Work (Includes both work outside the home and housework):  8  E. Relations with Other People:   3  F. Sleep:   5  G. Enjoyment of Life:   5    2. Patient Prefers to Take their Pain Medications:     []  On a regular basis   [x]  Only when necessary    []  Does not take pain medications    3. What are the Patient's Goals/Expectations for Visiting Pain Management? []  Learn about my pain    []  Receive Medication   []  Physical Therapy     []  Treat Depression   [x]  Receive Injections    []  Treat Sleep   []  Deal with Anxiety and Stress   []  Treat Opoid Dependence/Addiction   []  Other:      HPI:   Nubia Jaime is a 57 y.o. female is here today for    Chief Complaint: Neck pain     HPI   2 week FU to review C-xray. Main complaint is posterior neck pain- constant aching pain and very stiff. Pain is worse with activity and as day progresses. Low back pain pain has been well controlled and tolerable from L-facet RFA. Has complaints of left knee pain which is chronic but is now more noticeable as low back pain has improved. Has followed at [facility] and had steroid and gel injections and was told needed knee replacement. Discussed possible genicular nerve block in future- stabbing, aching pain. Pain increases with bending, lifting, twisting , turning head, walking, standing, raising arms, getting up and down, and housework or working at job.     Continues Robaxin for muscle spasms which is helping      Medications reviewed. Patient denies side effects with medications. Patient states she is taking medications as prescribed. Shedenies receiving pain medications from other sources. She denies any ER visits since last visit. Pain scale with out pain medications or at its worst is 7-8/10. C-xray:   FINDINGS:           There is straightening of the normal cervical lordosis. There is mild disc space narrowing at C5-C6, C6-7 and to lesser extent C4-5. . . The facets and uncovertebral joints are normal. There is no prevertebral soft tissue swelling. No suspicious osseous    lesions are present. The lateral masses of C1 and the dens of C2 are normal.           Impression       1. Straightening of the normal cervical lordosis and mild cervical spondylosis especially at C5-C6, C6-7 and to lesser extent at C4-5. The patientis allergic to nsaids, pcn [penicillins], and medrol [methylprednisolone]. Subjective:      Review of Systems   Constitutional: Negative. Respiratory: Negative. Cardiovascular: Negative. Musculoskeletal:  Positive for arthralgias, gait problem, joint swelling, myalgias, neck pain and neck stiffness. Negative for back pain. No assist devices    Neurological:  Positive for weakness. Negative for numbness and headaches. Psychiatric/Behavioral: Negative. Negative for sleep disturbance. Objective:     Vitals:    11/08/22 0743   BP: 120/82   Weight: 163 lb (73.9 kg)   Height: 5' 6\" (1.676 m)       Physical Exam  Vitals and nursing note reviewed. Constitutional:       General: She is not in acute distress. Appearance: Normal appearance. She is well-developed. She is not diaphoretic. HENT:      Head: Normocephalic and atraumatic. Right Ear: External ear normal.      Left Ear: External ear normal.      Nose: Nose normal.      Mouth/Throat:      Pharynx: No oropharyngeal exudate. Eyes:      General: No scleral icterus.         Right eye: Mood and Affect: Mood normal. Mood is not anxious or depressed. Affect is not labile, blunt, angry or inappropriate. Speech: Speech normal. She is communicative. Speech is not rapid and pressured, delayed, slurred or tangential.         Behavior: Behavior normal. Behavior is not agitated, slowed, aggressive, withdrawn, hyperactive or combative. Behavior is cooperative. Thought Content: Thought content normal. Thought content is not paranoid or delusional. Thought content does not include homicidal or suicidal ideation. Thought content does not include homicidal or suicidal plan. Cognition and Memory: Cognition normal. Memory is not impaired. She does not exhibit impaired recent memory or impaired remote memory. Judgment: Judgment normal. Judgment is not impulsive or inappropriate. BETHANY  Patricks test  positive  Yeoman's  or Gaenslen's positive       Assessment:     1. Spondylosis of cervical region without myelopathy or radiculopathy    2. Neck pain    3. Lumbar spondylosis    4. Neuroforaminal stenosis of lumbar spine    5. Chronic myofascial pain    6. Chronic pain of left knee    7. Primary osteoarthritis of left knee    8. Chronic pain syndrome            Plan:      OARRS reviewed. Current MED: 0  Patient was not offered naloxone for home. Discussed long term side effects of medications, tolerance, dependency and addiction. Previous UDS reviewed  UDS preformed today for compliance. Patient told can not receive any pain medications from any other source. No evidence of abuse, diversion or aberrant behavior. Medications and/or procedures to improve function and quality of life- patient understanding with this and that may not be pain free  Discussed with patient about safe storage of medications at home  Discussed possible weaning of medication dosing dependent on treatment/procedure results.    Discussed with patient about risks with procedure including infection, reaction to medication, increased pain, or bleeding. Procedure notes reviewed in detail   Continues relief of over 90% from L-facet RFA. Reviewed c-xray in detail  Plan to repeat C-facet RFA @ C5-6, and C6-7 for longer term pain relief. Procedure and risks discussed in detail with patient. Received over 80% relief from previous RFA for over 8 months   Needs to hold baby aspirin and Ibuprofen   Discussed possible trigger point injections in future   Continue Ibuprofen prn and Robaxin for muscle spasms from pcp  Ordered updated left knee xray. Discussed possible genicular nerve block in future     Meds. Prescribed:   No orders of the defined types were placed in this encounter. Return for Bilateral C-facet RFA @ C5-6, and C6-7. , follow up after procedure.                Electronically signed by JAIME Urena CNP on11/8/2022 at 8:12 AM

## 2022-12-14 ENCOUNTER — PREP FOR PROCEDURE (OUTPATIENT)
Dept: PHYSICAL MEDICINE AND REHAB | Age: 62
End: 2022-12-14

## 2022-12-15 NOTE — DISCHARGE INSTRUCTIONS
ANESTHESIA INSTRUCTIONS FOLLOWING SURGERY        Since you may experience some intermittent light-headedness for the next several hours, we suggest you plan on bed rest or quiet relaxation this evening. You must have a friend or relative stay with you tonight. Because of the sedation you have received, it is recommended that you do not drive a motor vehicle, operate any kind of machinery, or sign any contractual agreement for 24 hours following the procedure. You should not take alcoholic beverages tonight and only take sleeping medication that has been specifically prescribed for you by your physician. Call office [phone] if you have:  Temperature greater than 100.4  Persistent nausea and vomiting  Severe uncontrolled pain  Redness, tenderness, or signs of infection (pain, swelling, redness, odor or green/yellow discharge around the site)  Difficulty breathing, headache or visual disturbances  Hives  Persistent dizziness or light-headedness  Extreme fatigue  Any other questions or concerns you may have after discharge    In an emergency, call 911 or go to an Emergency Department at a nearby hospital    It is important to bring a complete, current list of your medications to any medical appointments or hospitalizations. REMINDER:   Carry a list of your medications and allergies with you at all times  Call your pharmacy at least 1 week in advance to refill prescriptions    Diet: Resume your usual diet. Good nutrition promotes healing. Increase fluid intake. Activity: Rest for 24 hrs then resume normal activity. HOME MEDICATIONS:      If on blood thinning products such as; Aspirin, NSAIDS, Plavix, Coumadin, Xarelto, Fish Oil, Multi-Vitamins or Herbal Supplements restart in 24 hours      Restart Metformin in 48 hours if you had procedure with dye. Restart Metformin in 24 hours if no dye used during procedure.         Education Materials Received: {yes/no:234709}  Belongings Returned: {yes/no:507985}          I understand and acknowledge receipt of the above instructions. Patient or Guardian Signature                                                         Date/Time                                                                                                                                            Physician's or R.N.'s Signature                                                                  Date/Time      The discharge instructions have been reviewed with the patient and/or Guardian. Patient and/or Guardian signed and retained a printed copy. Call office [phone] if you have:  Temperature greater than 100.4  Persistent nausea and vomiting  Severe uncontrolled pain  Redness, tenderness, or signs of infection (pain, swelling, redness, odor or green/yellow discharge around the site)  Difficulty breathing, headache or visual disturbances  Hives  Persistent dizziness or light-headedness  Extreme fatigue  Any other questions or concerns you may have after discharge    In an emergency, call 911 or go to an Emergency Department at a nearby hospital    It is important to bring a complete, current list of your medications to any medical appointments or hospitalizations. REMINDER:   Carry a list of your medications and allergies with you at all times  Call your pharmacy at least 1 week in advance to refill prescriptions    Diet: Resume your usual diet. Good nutrition promotes healing. Increase fluid intake. Activity: Rest for 24 hrs then resume normal activity. Education Materials Received: {yes/no:146126}  Belongings Returned: {yes/no:154453}          I understand and acknowledge receipt of the above instructions. Patient or Guardian Signature                                                         Date/Time                                                                                                                                            Physician's or R.N.'s Signature                                                                  Date/Time      The discharge instructions have been reviewed with the patient and/or Guardian. Patient and/or Guardian signed and retained a printed copy.

## 2022-12-20 ENCOUNTER — APPOINTMENT (OUTPATIENT)
Dept: GENERAL RADIOLOGY | Age: 62
End: 2022-12-20
Attending: PAIN MEDICINE
Payer: MEDICARE

## 2022-12-20 ENCOUNTER — ANESTHESIA (OUTPATIENT)
Dept: OPERATING ROOM | Age: 62
End: 2022-12-20
Payer: MEDICARE

## 2022-12-20 ENCOUNTER — ANESTHESIA EVENT (OUTPATIENT)
Dept: OPERATING ROOM | Age: 62
End: 2022-12-20
Payer: MEDICARE

## 2022-12-20 ENCOUNTER — HOSPITAL ENCOUNTER (OUTPATIENT)
Age: 62
Setting detail: OUTPATIENT SURGERY
Discharge: HOME | End: 2022-12-20
Attending: PAIN MEDICINE | Admitting: PAIN MEDICINE
Payer: MEDICARE

## 2022-12-20 VITALS
DIASTOLIC BLOOD PRESSURE: 82 MMHG | SYSTOLIC BLOOD PRESSURE: 127 MMHG | HEART RATE: 91 BPM | TEMPERATURE: 97.1 F | RESPIRATION RATE: 16 BRPM | OXYGEN SATURATION: 100 %

## 2022-12-20 PROCEDURE — 2709999900 HC NON-CHARGEABLE SUPPLY: Performed by: PAIN MEDICINE

## 2022-12-20 PROCEDURE — 3700000000 HC ANESTHESIA ATTENDED CARE: Performed by: PAIN MEDICINE

## 2022-12-20 PROCEDURE — 2580000003 HC RX 258

## 2022-12-20 PROCEDURE — 2500000003 HC RX 250 WO HCPCS: Performed by: PAIN MEDICINE

## 2022-12-20 PROCEDURE — 6360000002 HC RX W HCPCS

## 2022-12-20 PROCEDURE — 7100000011 HC PHASE II RECOVERY - ADDTL 15 MIN: Performed by: PAIN MEDICINE

## 2022-12-20 PROCEDURE — 3600000057 HC PAIN LEVEL 4 ADDL 15 MIN: Performed by: PAIN MEDICINE

## 2022-12-20 PROCEDURE — 3209999900 FLUORO FOR SURGICAL PROCEDURES

## 2022-12-20 PROCEDURE — 3700000001 HC ADD 15 MINUTES (ANESTHESIA): Performed by: PAIN MEDICINE

## 2022-12-20 PROCEDURE — 7100000010 HC PHASE II RECOVERY - FIRST 15 MIN: Performed by: PAIN MEDICINE

## 2022-12-20 PROCEDURE — 3600000056 HC PAIN LEVEL 4 BASE: Performed by: PAIN MEDICINE

## 2022-12-20 RX ORDER — BUPIVACAINE HYDROCHLORIDE 2.5 MG/ML
INJECTION, SOLUTION EPIDURAL; INFILTRATION; INTRACAUDAL PRN
Status: DISCONTINUED | OUTPATIENT
Start: 2022-12-20 | End: 2022-12-20 | Stop reason: ALTCHOICE

## 2022-12-20 RX ORDER — FENTANYL CITRATE 50 UG/ML
INJECTION, SOLUTION INTRAMUSCULAR; INTRAVENOUS PRN
Status: DISCONTINUED | OUTPATIENT
Start: 2022-12-20 | End: 2022-12-20 | Stop reason: SDUPTHER

## 2022-12-20 RX ORDER — LIDOCAINE HYDROCHLORIDE 10 MG/ML
INJECTION, SOLUTION EPIDURAL; INFILTRATION; INTRACAUDAL; PERINEURAL PRN
Status: DISCONTINUED | OUTPATIENT
Start: 2022-12-20 | End: 2022-12-20 | Stop reason: ALTCHOICE

## 2022-12-20 RX ORDER — SODIUM CHLORIDE 9 MG/ML
INJECTION, SOLUTION INTRAVENOUS CONTINUOUS PRN
Status: DISCONTINUED | OUTPATIENT
Start: 2022-12-20 | End: 2022-12-20 | Stop reason: SDUPTHER

## 2022-12-20 RX ADMIN — PROPOFOL 50 MG: 10 INJECTION, EMULSION INTRAVENOUS at 13:28

## 2022-12-20 RX ADMIN — FENTANYL CITRATE 100 MCG: 50 INJECTION, SOLUTION INTRAMUSCULAR; INTRAVENOUS at 13:10

## 2022-12-20 RX ADMIN — SODIUM CHLORIDE: 9 INJECTION, SOLUTION INTRAVENOUS at 13:10

## 2022-12-20 RX ADMIN — PROPOFOL 50 MG: 10 INJECTION, EMULSION INTRAVENOUS at 13:30

## 2022-12-20 RX ADMIN — PROPOFOL 40 MG: 10 INJECTION, EMULSION INTRAVENOUS at 13:26

## 2022-12-20 ASSESSMENT — PAIN SCALES - GENERAL: PAINLEVEL_OUTOF10: 0

## 2022-12-20 NOTE — H&P
H&P    Patient main complaint is posterior neck pain- constant aching pain and very stiff. Pain is worse with activity and as day progresses. Low back pain pain has been well controlled and tolerable from L-facet RFA. Has complaints of left knee pain which is chronic but is now more noticeable as low back pain has improved. Has followed at [facility] and had steroid and gel injections and was told needed knee replacement. Discussed possible genicular nerve block in future- stabbing, aching pain. Pain increases with bending, lifting, twisting , turning head, walking, standing, raising arms, getting up and down, and housework or working at job. Continues Robaxin for muscle spasms which is helping        Medications reviewed. Patient denies side effects with medications. Patient states she is taking medications as prescribed. Shedenies receiving pain medications from other sources. She denies any ER visits since last visit. Pain scale with out pain medications or at its worst is 7-8/10. C-xray:   FINDINGS:           There is straightening of the normal cervical lordosis. There is mild disc space narrowing at C5-C6, C6-7 and to lesser extent C4-5. . . The facets and uncovertebral joints are normal. There is no prevertebral soft tissue swelling. No suspicious osseous    lesions are present. The lateral masses of C1 and the dens of C2 are normal.           Impression       1. Straightening of the normal cervical lordosis and mild cervical spondylosis especially at C5-C6, C6-7 and to lesser extent at C4-5. The patientis allergic to nsaids, pcn [penicillins], and medrol [methylprednisolone]. Subjective:      Review of Systems   Constitutional: Negative. Respiratory: Negative. Cardiovascular: Negative. Musculoskeletal:  Positive for arthralgias, gait problem, joint swelling, myalgias, neck pain and neck stiffness. Negative for back pain.         No assist devices    Neurological: Positive for weakness. Negative for numbness and headaches. Psychiatric/Behavioral: Negative. Negative for sleep disturbance. Objective:      Vitals       Vitals:     11/08/22 0743   BP: 120/82   Weight: 163 lb (73.9 kg)   Height: 5' 6\" (1.676 m)            Physical Exam  Vitals and nursing note reviewed. Constitutional:       General: She is not in acute distress. Appearance: Normal appearance. She is well-developed. She is not diaphoretic. HENT:      Head: Normocephalic and atraumatic. Right Ear: External ear normal.      Left Ear: External ear normal.      Nose: Nose normal.      Mouth/Throat:      Pharynx: No oropharyngeal exudate. Eyes:      General: No scleral icterus. Right eye: No discharge. Left eye: No discharge. Conjunctiva/sclera: Conjunctivae normal.      Pupils: Pupils are equal, round, and reactive to light. Neck:      Thyroid: No thyromegaly. Cardiovascular:      Rate and Rhythm: Normal rate and regular rhythm. Heart sounds: Normal heart sounds. No murmur heard. No friction rub. No gallop. Pulmonary:      Effort: Pulmonary effort is normal. No respiratory distress. Breath sounds: Normal breath sounds. No wheezing or rales. Chest:      Chest wall: No tenderness. Abdominal:      General: Bowel sounds are normal. There is no distension. Palpations: Abdomen is soft. Tenderness: There is no abdominal tenderness. There is no guarding or rebound. Musculoskeletal:         General: Tenderness present. Cervical back: Rigidity, spasms, tenderness and bony tenderness present. No edema or erythema. Pain with movement present. No muscular tenderness. Decreased range of motion. Thoracic back: Tenderness present. Lumbar back: Tenderness and bony tenderness present. Decreased range of motion.  Positive right straight leg raise test. Negative left straight leg raise test.        Back:       Left knee: Swelling and bony tenderness present. Decreased range of motion. Tenderness present. Skin:     General: Skin is warm. Coloration: Skin is not pale. Findings: No erythema or rash. Neurological:      Mental Status: She is alert and oriented to person, place, and time. She is not disoriented. Cranial Nerves: No cranial nerve deficit. Sensory: No sensory deficit. Motor: Weakness present. No atrophy or abnormal muscle tone. Coordination: Coordination normal.      Gait: Gait abnormal.      Deep Tendon Reflexes: Reflexes are normal and symmetric. Babinski sign absent on the right side. Babinski sign absent on the left side. Psychiatric:         Attention and Perception: Attention normal. She is attentive. Mood and Affect: Mood normal. Mood is not anxious or depressed. Affect is not labile, blunt, angry or inappropriate. Speech: Speech normal. She is communicative. Speech is not rapid and pressured, delayed, slurred or tangential.         Behavior: Behavior normal. Behavior is not agitated, slowed, aggressive, withdrawn, hyperactive or combative. Behavior is cooperative. Thought Content: Thought content normal. Thought content is not paranoid or delusional. Thought content does not include homicidal or suicidal ideation. Thought content does not include homicidal or suicidal plan. Cognition and Memory: Cognition normal. Memory is not impaired. She does not exhibit impaired recent memory or impaired remote memory. Judgment: Judgment normal. Judgment is not impulsive or inappropriate. BETHANY  Patricks test  positive  Yeoman's  or Gaenslen's positive     Assessment:      1. Spondylosis of cervical region without myelopathy or radiculopathy    2. Neck pain    3. Lumbar spondylosis    4. Neuroforaminal stenosis of lumbar spine    5. Chronic myofascial pain    6. Chronic pain of left knee    7. Primary osteoarthritis of left knee    8.  Chronic pain syndrome       Plan: OARRS reviewed. Current MED: 0  Patient was not offered naloxone for home. Discussed long term side effects of medications, tolerance, dependency and addiction. Previous UDS reviewed  UDS preformed today for compliance. Patient told can not receive any pain medications from any other source. No evidence of abuse, diversion or aberrant behavior. Medications and/or procedures to improve function and quality of life- patient understanding with this and that may not be pain free  Discussed with patient about safe storage of medications at home  Discussed possible weaning of medication dosing dependent on treatment/procedure results. Discussed with patient about risks with procedure including infection, reaction to medication, increased pain, or bleeding. Procedure notes reviewed in detail   Continues relief of over 90% from L-facet RFA. Reviewed c-xray in detail  Plan to repeat C-facet RFA @ C5-6, and C6-7 for longer term pain relief. Procedure and risks discussed in detail with patient. Received over 80% relief from previous RFA for over 8 months   Needs to hold baby aspirin and Ibuprofen   Discussed possible trigger point injections in future   Continue Ibuprofen prn and Robaxin for muscle spasms from pcp  Ordered updated left knee xray. Discussed possible genicular nerve block in future         Return for Bilateral C-facet RFA @ C5-6, and C6-7. , follow up after procedure.

## 2022-12-20 NOTE — PROGRESS NOTES
1336 Received in Phase 2 . Drowsy responsive to verbal stimuli. Airway patent. O2 sat  100% Injection site clean and dry. Denies pain on arrival.  1341 Coffee given. per request.  2016 Assisted to sit at side of cart to get dressed  [phone] Discharged home via private car without complaint

## 2022-12-20 NOTE — ANESTHESIA PRE PROCEDURE
Department of Anesthesiology  Preprocedure Note       Name:  Jason Arambula   Age:  57 y.o.  :  1960                                          MRN:  [id number]         Date:  2022      Surgeon: Deysi Rahman):  Huber Dickens MD    Procedure: Procedure(s):  Bilateral cervical facet radiofrequency ablation Cervical 5-6, 6-7    Medications prior to admission:   Prior to Admission medications    Medication Sig Start Date End Date Taking?  Authorizing Provider   predniSONE (DELTASONE) 20 MG tablet TAKE 1 TABLET BY MOUTH TWICE DAILY  Patient not taking: Reported on 2022   Historical Provider, MD   methocarbamol (ROBAXIN) 750 MG tablet Take 750 mg by mouth 4 times daily    Historical Provider, MD   loratadine (CLARITIN) 10 MG tablet Take 10 mg by mouth daily Indications: Pt states she takes in spring and summer only    Historical Provider, MD   aspirin 81 MG tablet Take 81 mg by mouth daily    Historical Provider, MD   dilTIAZem (CARDIZEM) 30 MG tablet take 1 tablet by mouth twice a day 20   Historical Provider, MD   lisinopril (PRINIVIL;ZESTRIL) 5 MG tablet  20   Historical Provider, MD ANSARI VITAMIN D-3 125 MCG (5000 UT) CAPS capsule take 1 capsule by mouth once daily WITH SUPPER  Patient not taking: Reported on 2022   Historical Provider, MD   calcium carbonate 1500 (600 Ca) MG TABS tablet  20   Historical Provider, MD   albuterol sulfate  (90 Base) MCG/ACT inhaler Indications: Pt states she takes once in a while 20   Historical Provider, MD   ibuprofen (ADVIL;MOTRIN) 800 MG tablet Take 1 tablet by mouth 3 times daily 16   Historical Provider, MD   amitriptyline (ELAVIL) 50 MG tablet Take 1 tablet by mouth daily 16   Historical Provider, MD   Ascorbic Acid (VITAMIN C) 500 MG tablet Take by mouth daily  Patient not taking: Reported on 2022    Historical Provider, MD   VITAMIN D, CHOLECALCIFEROL, PO Take 1 tablet by mouth daily Historical Provider, MD       Current medications:    No current outpatient medications on file. No current facility-administered medications for this visit. Allergies: Allergies   Allergen Reactions    Nsaids Other (See Comments)     STOPPED BREATHING    Pcn [Penicillins] Other (See Comments)     DIZZINESS    Medrol [Methylprednisolone] Palpitations       Problem List:    Patient Active Problem List   Diagnosis Code    PVD (peripheral vascular disease) ([facility] [facility] I73.9    Lumbar spondylosis M47.816    Cervical spondylosis M47.812       Past Medical History:        Diagnosis Date    Anxiety     Chronic back pain     COPD (chronic obstructive pulmonary disease) ([facility]     Depression     Disc disorder     Emphysema of lung ([facility]     Fibromyalgia     Hyperlipidemia     Hypertension     Osteoarthritis     Osteoporosis        Past Surgical History:        Procedure Laterality Date    FACET JOINT INJECTION Bilateral 5/14/2020    bilat.  L3, L4 medial branch and L5 dorsal rami injection performed by Lazara Mojica MD at [facility] 9 Bilateral 6/17/2020    bilateral C5-6-7 medial branch block performed by Lazara Mojica MD at [address] [place] [address] Road INJECTION Bilateral 1/20/2021    bilateral C5-6-7 medial branch block performed by Lazara Mojica MD at [address] [place] [address] INJECTION Bilateral 12/21/2021    bilateral C-facet MBB # 2 @ C5-6, and C6-7 for diagnostic purpose performed by Lucile Siemens, MD at [address] [place] [address] INJECTION Bilateral 2/11/2022    bilateral C-facet RFA @ C5-6, and C6-7. performed by Lucile Siemens, MD at [address] [place] [address] ARTHROSCOPY Bilateral 2019    PAIN MANAGEMENT PROCEDURE Bilateral 7/22/2020    bilateral L3-4 medial branch L5 dorsal rami performed by Lazara Mojica MD at [place] [address] Right 10/7/2020 RFA, L3-4 medial branch and L5 dorsal rami, right. performed by Jaquelin Morales MD at [facility] Left 11/4/2020    RFA, L3-4 medial branch and L5 dorsal rami, left. performed by Jaquelin Morales MD at [facility] Left 6/8/2021    L-facet RFA's @ L3-4,L4-5 and L5-S1  Left performed by Natalia Hicks MD at [facility] Right 7/27/2021    right L-facet RFA @ L3-4, L4-5, and L5-S1 performed by Natalia Hicks MD at [facility] Bilateral 10/4/2022    Bilateral Lumar Facet Radiofrequency Ablation Lumbar 4-5, 5-Sacral1 performed by Natalia Hicks MD at [address] [place] [address]       Social History:    Social History     Tobacco Use    Smoking status: Every Day     Packs/day: 1.00     Types: Cigarettes    Smokeless tobacco: Never   Substance Use Topics    Alcohol use: Yes     Comment: 2 BEERS A MONTH                                 Ready to quit: Not Answered  Counseling given: Not Answered      Vital Signs (Current): There were no vitals filed for this visit. BP Readings from Last 3 Encounters:   11/08/22 120/82   10/25/22 108/70   10/04/22 109/74       NPO Status:                                                                                 BMI:   Wt Readings from Last 3 Encounters:   11/08/22 163 lb (73.9 kg)   10/25/22 163 lb (73.9 kg)   10/04/22 163 lb (73.9 kg)     There is no height or weight on file to calculate BMI.    CBC: No results found for: WBC, RBC, HGB, HCT, MCV, RDW, PLT    CMP: No results found for: NA, K, CL, CO2, BUN, CREATININE, GFRAA, AGRATIO, LABGLOM, GLUCOSE, GLU, PROT, CALCIUM, BILITOT, ALKPHOS, AST, ALT    POC Tests: No results for input(s): POCGLU, POCNA, POCK, POCCL, POCBUN, POCHEMO, POCHCT in the last 72 hours.     Coags: No results found for: PROTIME, INR, APTT    HCG (If Applicable): No results found for: PREGTESTUR, PREGSERUM, HCG, HCGQUANT     ABGs: No results found for: PHART, PO2ART, ASZ2ELV, WLR7OFV, BEART, N6HZCRWD     Type & Screen (If Applicable):  No results found for: LABABO, LABRH    Drug/Infectious Status (If Applicable):  No results found for: HIV, HEPCAB    COVID-19 Screening (If Applicable):   Lab Results   Component Value Date/Time    COVID19 Not Detected 07/17/2020 10:27 AM           Anesthesia Evaluation  Patient summary reviewed no history of anesthetic complications:   Airway: Mallampati: II  TM distance: >3 FB   Neck ROM: full  Mouth opening: > = 3 FB   Dental:          Pulmonary:   (+) COPD:  decreased breath sounds                            Cardiovascular:    (+) hypertension:,                   Neuro/Psych:   (+) neuromuscular disease:, psychiatric history:            GI/Hepatic/Renal:             Endo/Other:                     Abdominal:             Vascular: Other Findings:             Anesthesia Plan      MAC     ASA 3       Induction: intravenous. MIPS: prophylactic pharmacologic antiemetic agents not administered perioperatively for documented reasons. Anesthetic plan and risks discussed with patient.       Plan discussed with CRNA and surgical team.                    Lali Cabrera MD   12/20/2022

## 2022-12-20 NOTE — ANESTHESIA POSTPROCEDURE EVALUATION
Department of Anesthesiology  Postprocedure Note    Patient: Pedro Luis Rivera  MRN: [id number]  YOB: 1960  Date of evaluation: 12/20/2022      Procedure Summary     Date: 12/20/22 Room / Location[facility]    Anesthesia Start: 6519 Anesthesia Stop: 4135    Procedure: Bilateral cervical facet radiofrequency ablation Cervical 5-6, 6-7 (Bilateral: Neck) Diagnosis:       Spondylosis of cervical region without myelopathy or radiculopathy      (Spondylosis of cervical region without myelopathy or radiculopathy [A59.128])    Surgeons: Lucile Siemens, MD Responsible Provider: Theo Edwards MD    Anesthesia Type: MAC ASA Status: 3          Anesthesia Type: No value filed. Kimberlee Phase I:      Kimberlee Phase II: Kimberlee Score: 10      Anesthesia Post Evaluation    Patient location during evaluation: bedside  Patient participation: complete - patient cannot participate  Level of consciousness: awake and alert  Airway patency: patent  Nausea & Vomiting: no nausea and no vomiting  Complications: no  Cardiovascular status: hemodynamically stable  Respiratory status: acceptable  Hydration status: euvolemic      [facility]  POST-ANESTHESIA NOTE       Name:  Pedro Luis Rivera                                         Age:  57 y.o.   MRN:  [id number]      Last Vitals:  /82   Pulse 91   Temp 97.1 °F (36.2 °C) (Temporal)   Resp 16   SpO2 100%   Patient Vitals for the past 4 hrs:   BP Temp Temp src Pulse Resp SpO2   12/20/22 1336 127/82 97.1 °F (36.2 °C) Temporal 91 16 100 %   12/20/22 1245 (!) 147/83 (!) 96.5 °F (35.8 °C) Temporal 85 16 96 %       Level of Consciousness:  Awake    Respiratory:  Stable    Oxygen Saturation:  Stable    Cardiovascular:  Stable    Hydration:  Adequate    PONV:  Stable    Post-op Pain:  Adequate analgesia    Post-op Assessment:  No apparent anesthetic complications    Additional Follow-Up / Treatment / Comment: None    Breonna Bocanegra MD  December 20, 2022   1:53 PM

## 2022-12-20 NOTE — OP NOTE
Pre-Procedure Note    Patient Name: Ludin Kolb   YOB: 1960  Medical Record Number: [id number]  Date: 12/20/22     Indication:  Neck pain    Consent: On file. Vital Signs:   Vitals:    12/20/22 1245   BP: (!) 147/83   Pulse: 85   Resp: 16   Temp: (!) 96.5 °F (35.8 °C)   SpO2: 96%       Past Medical History:   has a past medical history of Anxiety, Chronic back pain, COPD (chronic obstructive pulmonary disease) ([facility] [address] Depression, Disc disorder, Emphysema of lung ([facility] [address] Fibromyalgia, Hyperlipidemia, Hypertension, Osteoarthritis, and Osteoporosis. Past Surgical History:   has a past surgical history that includes Tonsillectomy (1972); Knee arthroscopy (Bilateral, 2019); Facet joint injection (Bilateral, 5/14/2020); Facet joint injection (Bilateral, 6/17/2020); Pain management procedure (Bilateral, 7/22/2020); Pain management procedure (Right, 10/7/2020); Pain management procedure (Left, 11/4/2020); Facet joint injection (Bilateral, 1/20/2021); Pain management procedure (Left, 6/8/2021); Pain management procedure (Right, 7/27/2021); Facet joint injection (Bilateral, 12/21/2021); Facet joint injection (Bilateral, 2/11/2022); and Pain management procedure (Bilateral, 10/4/2022). Pre-Sedation Documentation and Exam:   Vital signs have been reviewed (see flow sheet for vitals). Sedation/ Anesthesia :  MAC. Patient is an appropriate candidate for plan of sedation: yes      Preoperative Diagnosis:  C-spondylosis     Post-Op Dx: as above        Procedure Performed:  Radiofrequency abalation of median branchs at the levels of C5-6 and C6-7 bilateral under fluoroscopic guidance      Indication for the Procedure: The patient had history of chronic neck pain that is not responding well to the conservative treatment. Patient's pain is mostly axial in nature. Pain is interfering with the activities of daily living. Physical examination revealed facet tenderness and facet loading is positive. The patient had undergone cervical  facet joint injections with pain relief that lasted for only a short period of time and confirmatory median branch block gave patient pain relief of 70 % . Hence we decided to do radiofrequency abalation of median branches for long term pain relief. The procedure and risks were discussed with the patient and an informed consent was obtained. Procedure:      A meaningful communication was kept up with the patient throughout  the procedure. Patient is placed in prone position and skin over the back was prepped and draped in sterile manner. Then using fluoroscopy the waist of facet joint complex of the vertebra was observed and the view was optimized . The skin and deep tissues posterior were infiltrated with 15 ml of 1% Xylocaine. The RF canula with the 10 mm active tip was introduced through the skin wheal under fluoroscopy guidance such that the tip of the needle lies in the groove of the waist of facet joint complex. Then a lateral view of cervical  spine was obtained to make sure the tip of needle is in the centroid of the articular pillar.  hen electric impedence was checked to make sure it is acceptable. Then a sensory stimulus was applied at 50 Hz up to 1 volt and concordant pain symptoms were reproduced. Then a motor stimulus was applied at 2 Hz up to 2 volts and no motor stimulation was seen in upper extremities. Some multifidus stimulus was seen. Then after negative aspiration 0.25%  Marcaine 4 cc was injected through the needle in divided doses . Then an  lesion was done at 80 degrees centigrade for 90 seconds. EBL-0     Patient tolerated the procedure well and was discharged home in stable condition.            Electronically signed by Dan Kehr, MD on 12/20/22 at 12:55 PM EST

## 2023-01-03 ENCOUNTER — OFFICE VISIT (OUTPATIENT)
Dept: PHYSICAL MEDICINE AND REHAB | Age: 63
End: 2023-01-03
Payer: MEDICARE

## 2023-01-03 ENCOUNTER — TELEPHONE (OUTPATIENT)
Dept: PHYSICAL MEDICINE AND REHAB | Age: 63
End: 2023-01-03

## 2023-01-03 VITALS
WEIGHT: 163 LBS | DIASTOLIC BLOOD PRESSURE: 78 MMHG | HEIGHT: 66 IN | SYSTOLIC BLOOD PRESSURE: 130 MMHG | BODY MASS INDEX: 26.2 KG/M2

## 2023-01-03 DIAGNOSIS — M47.812 SPONDYLOSIS OF CERVICAL REGION WITHOUT MYELOPATHY OR RADICULOPATHY: ICD-10-CM

## 2023-01-03 DIAGNOSIS — M79.18 CHRONIC MYOFASCIAL PAIN: ICD-10-CM

## 2023-01-03 DIAGNOSIS — M25.562 CHRONIC PAIN OF LEFT KNEE: ICD-10-CM

## 2023-01-03 DIAGNOSIS — M47.816 LUMBAR SPONDYLOSIS: ICD-10-CM

## 2023-01-03 DIAGNOSIS — G89.4 CHRONIC PAIN SYNDROME: ICD-10-CM

## 2023-01-03 DIAGNOSIS — G89.29 CHRONIC PAIN OF LEFT KNEE: ICD-10-CM

## 2023-01-03 DIAGNOSIS — M48.061 NEUROFORAMINAL STENOSIS OF LUMBAR SPINE: ICD-10-CM

## 2023-01-03 DIAGNOSIS — G89.29 CHRONIC MYOFASCIAL PAIN: ICD-10-CM

## 2023-01-03 DIAGNOSIS — M54.2 NECK PAIN: ICD-10-CM

## 2023-01-03 DIAGNOSIS — M17.12 PRIMARY OSTEOARTHRITIS OF LEFT KNEE: Primary | ICD-10-CM

## 2023-01-03 PROCEDURE — 3017F COLORECTAL CA SCREEN DOC REV: CPT | Performed by: NURSE PRACTITIONER

## 2023-01-03 PROCEDURE — 99214 OFFICE O/P EST MOD 30 MIN: CPT | Performed by: NURSE PRACTITIONER

## 2023-01-03 PROCEDURE — 4004F PT TOBACCO SCREEN RCVD TLK: CPT | Performed by: NURSE PRACTITIONER

## 2023-01-03 PROCEDURE — G8419 CALC BMI OUT NRM PARAM NOF/U: HCPCS | Performed by: NURSE PRACTITIONER

## 2023-01-03 PROCEDURE — G8427 DOCREV CUR MEDS BY ELIG CLIN: HCPCS | Performed by: NURSE PRACTITIONER

## 2023-01-03 PROCEDURE — G8484 FLU IMMUNIZE NO ADMIN: HCPCS | Performed by: NURSE PRACTITIONER

## 2023-01-03 ASSESSMENT — ENCOUNTER SYMPTOMS
RESPIRATORY NEGATIVE: 1
BACK PAIN: 0

## 2023-01-03 NOTE — PROGRESS NOTES
[address] [facility] [address]  Dept: [phone]  Dept Fax: [phone]: [phone]    Visit Date: 1/3/2023    Functionality Assessment/Goals Worksheet     On a scale of 0 (Does not Interfere) to 10 (Completely Interferes)     1. Which number describes how during the past week pain has interfered with       the following:  A. General Activity:  4  B. Mood: 3  C. Walking Ability:  4  D. Normal Work (Includes both work outside the home and housework):  4  E. Relations with Other People:   2  F. Sleep:   4  G. Enjoyment of Life:   2    2. Patient Prefers to Take their Pain Medications:     []  On a regular basis   []  Only when necessary    [x]  Does not take pain medications    3. What are the Patient's Goals/Expectations for Visiting Pain Management? [x]  Learn about my pain    []  Receive Medication   []  Physical Therapy     []  Treat Depression   []  Receive Injections    []  Treat Sleep   []  Deal with Anxiety and Stress   []  Treat Opoid Dependence/Addiction   [x]  Other:ablations? HPI:   Niels Diamond is a 57 y.o. female is here today for    Chief Complaint: Left knee pain     HPI   FU from bilateral C-facet RFA from 12/20/2022. Receiving \"probably 99% \" relief of neck pain from this procedure. Not having any pain in neck and minimal muscle pain and states having any radicular pain or numbness or headaches since procedure. \"Its raining today and I am not having any pain and usually when it rains I hurt\"     Low back pain pain has been well controlled and tolerable from L-facet RFA. Continues to have pain in left knee and states now this is main complaint- aching, stiff throbbing and stabbing increased with standing and walking.  Reveiwed left knee xray    Pain increases with bending, lifting, twisting , walking, standing, getting up and down, and housework or working at job. Continues Ibuprofen prn and Robaxin     Radiology:  Left knee pain:   FINDINGS:   There is prominent medial compartment joint space narrowing. There are small osteophytes at the medial margin of the knee and at the patellar margin. No fracture is evident. There is a small joint effusion. Impression    Moderate medial compartment osteoarthritis and mild degenerative changes of the patellofemoral joint. Medications reviewed. Patient denies side effects with medications. Patient states she is taking medications as prescribed. Shedenies receiving pain medications from other sources. She denies any ER visits since last visit. Pain scale with out pain medications or at its worst is 6/10. Left knee 1-2/10 neck         The patientis allergic to nsaids, pcn [penicillins], and medrol [methylprednisolone]. Subjective:      Review of Systems   Constitutional: Negative. Respiratory: Negative. Cardiovascular: Negative. Musculoskeletal:  Positive for arthralgias, gait problem, joint swelling, myalgias and neck stiffness. Negative for back pain and neck pain. No assist devices    Neurological:  Positive for weakness. Negative for numbness and headaches. Psychiatric/Behavioral: Negative. Negative for sleep disturbance. Objective:     Vitals:    01/03/23 0736   BP: 130/78   Site: Left Upper Arm   Position: Sitting   Weight: 163 lb (73.9 kg)   Height: 5' 6\" (1.676 m)       Physical Exam  Vitals and nursing note reviewed. Constitutional:       General: She is not in acute distress. Appearance: Normal appearance. She is well-developed. She is not diaphoretic. HENT:      Head: Normocephalic and atraumatic. Right Ear: External ear normal.      Left Ear: External ear normal.      Nose: Nose normal.      Mouth/Throat:      Pharynx: No oropharyngeal exudate. Eyes:      General: No scleral icterus. Right eye: No discharge.          Left eye: No discharge. Conjunctiva/sclera: Conjunctivae normal.      Pupils: Pupils are equal, round, and reactive to light. Neck:      Thyroid: No thyromegaly. Cardiovascular:      Rate and Rhythm: Normal rate and regular rhythm. Heart sounds: Normal heart sounds. No murmur heard. No friction rub. No gallop. Pulmonary:      Effort: Pulmonary effort is normal. No respiratory distress. Breath sounds: Normal breath sounds. No wheezing or rales. Chest:      Chest wall: No tenderness. Abdominal:      General: Bowel sounds are normal. There is no distension. Palpations: Abdomen is soft. Tenderness: There is no abdominal tenderness. There is no guarding or rebound. Musculoskeletal:         General: Tenderness present. Cervical back: Rigidity present. No edema, erythema, spasms, tenderness or bony tenderness. No pain with movement or muscular tenderness. Normal range of motion. Thoracic back: Tenderness present. Lumbar back: Tenderness and bony tenderness present. Decreased range of motion. Negative right straight leg raise test and negative left straight leg raise test.      Left knee: Bony tenderness present. No swelling. Decreased range of motion. Tenderness present. Skin:     General: Skin is warm. Coloration: Skin is not pale. Findings: No erythema or rash. Neurological:      Mental Status: She is alert and oriented to person, place, and time. She is not disoriented. Cranial Nerves: No cranial nerve deficit. Sensory: No sensory deficit. Motor: Weakness present. No atrophy or abnormal muscle tone. Coordination: Coordination normal.      Gait: Gait normal.      Deep Tendon Reflexes: Reflexes are normal and symmetric. Babinski sign absent on the right side. Babinski sign absent on the left side. Psychiatric:         Attention and Perception: Attention normal. She is attentive.          Mood and Affect: Mood normal. Mood is not anxious or depressed. Affect is not labile, blunt, angry or inappropriate. Speech: Speech normal. She is communicative. Speech is not rapid and pressured, delayed, slurred or tangential.         Behavior: Behavior normal. Behavior is not agitated, slowed, aggressive, withdrawn, hyperactive or combative. Behavior is cooperative. Thought Content: Thought content normal. Thought content is not paranoid or delusional. Thought content does not include homicidal or suicidal ideation. Thought content does not include homicidal or suicidal plan. Cognition and Memory: Cognition normal. Memory is not impaired. She does not exhibit impaired recent memory or impaired remote memory. Judgment: Judgment normal. Judgment is not impulsive or inappropriate. BETHANY  Patricks test  positive  Yeoman's  or Gaenslen's positive         Assessment:     1. Primary osteoarthritis of left knee    2. Chronic pain of left knee    3. Spondylosis of cervical region without myelopathy or radiculopathy    4. Neck pain    5. Lumbar spondylosis    6. Neuroforaminal stenosis of lumbar spine    7. Chronic pain syndrome    8. Chronic myofascial pain            Plan:      OARRS reviewed. Current MED: 0  Patient was not offered naloxone for home. Discussed long term side effects of medications, tolerance, dependency and addiction. Previous UDS reviewed  UDS preformed today for compliance. Patient told can not receive any pain medications from any other source. No evidence of abuse, diversion or aberrant behavior. Medications and/or procedures to improve function and quality of life- patient understanding with this and that may not be pain free  Discussed with patient about safe storage of medications at home  Discussed possible weaning of medication dosing dependent on treatment/procedure results.    Discussed with patient about risks with procedure including infection, reaction to medication, increased pain, or bleeding. Procedure notes reviewed in detail   Receiving over 95% relief of neck pain from C-facet RFA. Continues relief of over 90% from L-facet RFA. Reviewed left knee xray with patient   Plan left knee genicular nerve block for diagnostic purpose. Procedure discussed in detail. With plan for RFA for longer term pain relief in future if effective short tern relief. Needs to hold baby aspirin and Ibuprofen   Has followed at [facility] and had steroid and gel injections in past without relief   Continue Ibuprofen prn and Robaxin for muscle spasms from pcp    Meds. Prescribed:   No orders of the defined types were placed in this encounter. Return for left knee genicular nerve block. , follow up after procedure.                Electronically signed by JAIME Valencia CNP on1/3/2023 at 7:57 AM

## 2023-02-03 ENCOUNTER — PREP FOR PROCEDURE (OUTPATIENT)
Dept: PHYSICAL MEDICINE AND REHAB | Age: 63
End: 2023-02-03

## 2023-02-03 NOTE — DISCHARGE INSTRUCTIONS
ANESTHESIA INSTRUCTIONS FOLLOWING SURGERY        Since you may experience some intermittent light-headedness for the next several hours, we suggest you plan on bed rest or quiet relaxation this evening. You must have a friend or relative stay with you tonight. Because of the sedation you have received, it is recommended that you do not drive a motor vehicle, operate any kind of machinery, or sign any contractual agreement for 24 hours following the procedure. You should not take alcoholic beverages tonight and only take sleeping medication that has been specifically prescribed for you by your physician. Call office [phone] if you have:  Temperature greater than 100.4  Persistent nausea and vomiting  Severe uncontrolled pain  Redness, tenderness, or signs of infection (pain, swelling, redness, odor or green/yellow discharge around the site)  Difficulty breathing, headache or visual disturbances  Hives  Persistent dizziness or light-headedness  Extreme fatigue  Any other questions or concerns you may have after discharge    In an emergency, call 911 or go to an Emergency Department at a nearby hospital    It is important to bring a complete, current list of your medications to any medical appointments or hospitalizations. REMINDER:   Carry a list of your medications and allergies with you at all times  Call your pharmacy at least 1 week in advance to refill prescriptions    Diet: Resume your usual diet. Good nutrition promotes healing. Increase fluid intake. Activity: Rest for 24 hrs then resume normal activity. HOME MEDICATIONS:      If on blood thinning products such as; Aspirin, NSAIDS, Plavix, Coumadin, Xarelto, Fish Oil, Multi-Vitamins or Herbal Supplements restart in 24 hours      Restart Metformin in 48 hours if you had procedure with dye. Restart Metformin in 24 hours if no dye used during procedure.         Education Materials Received: {yes/no:308038}  Belongings Returned: {yes/no:565865}          I understand and acknowledge receipt of the above instructions. Patient or Guardian Signature                                                         Date/Time                                                                                                                                            Physician's or R.N.'s Signature                                                                  Date/Time      The discharge instructions have been reviewed with the patient and/or Guardian. Patient and/or Guardian signed and retained a printed copy. Surgical Site Infections        How can we work together to prevent Surgical Site Infections? We would like to thank you for choosing [facility] for your Surgical Care. Below you will find helpful information on how we can work together to prevent Surgical Site Infections. What is a Surgical Site Infection (SSI)? A surgical site infection is an infection that occurs after surgery in the part of the body where the surgery took place. Most patients who have surgery do not develop an infection. However, infections develop in about 1 to 3 out of every 100 patients who have surgery. Some of the common symptoms of a surgical site infection are:  Redness and pain around the area where you had surgery  Drainage of cloudy fluid from your surgical wound  Fever    Can SSIs be treated? Yes. Most surgical site infections can be treated with antibiotics. The antibiotic given to you depends on the bacteria (germs) causing the infection. Sometimes patients with SSIs also need another surgery to treat the infection. What are some of the things that hospitals are doing to prevent SSIs? To prevent SSIs, doctors, nurses, and other healthcare providers:   May remove some of your hair immediately before your surgery using electric clippers if the hair is in the same area where the procedure will occur. They should not shave you with a razor. Give you antibiotics before your surgery starts. In most cases, you should get antibiotics within 60 minutes before the surgery starts and the antibiotics should be stopped within 24 hours after surgery. Clean the skin at the site of your surgery with a special soap that kills germs. Clean their hands and arms up to their elbows with an antiseptic agent just before the surgery. Wear special hair covers, masks, gowns, and gloves during surgery to  keep the surgery area clean. Clean their hands with soap and water or an alcohol-based hand rub before and after caring for each patient. If you do not see your providers clean their hands, please     ask  them to do so. What can I do to help prevent SSIs? Before your surgery:  Tell your doctor about other medical problems you may have. Health problems such as allergies, diabetes, and obesity could affect your surgery and your treatment. Quit smoking. Patients who smoke get more infections. Talk to your doctor about how you can quit before your surgery. Do not shave near where you will have surgery. Shaving with a razor can irritate your skin and make it easier to develop an infection. At the time of your surgery:  Speak up if someone tries to shave you with a razor before surgery. Ask why you need to be shaved and talk with your surgeon if you have any concerns. Ask if you will get antibiotics before surgery. After your surgery:  Make sure that your healthcare providers clean their hands before examining you, either with soap and water or an alcohol-based hand rub. Family and friends who visit you should not touch the surgical wound or dressings. Family and friends should clean their hands with soap and water or an alcohol-based hand rub before and after visiting you.  If you do not see them clean their hands, ask them to clean their hands.    What do I need to do when I go home from the hospital?  Before you go home, your doctor or nurse should explain everything you need to know about taking care of your wound. Make sure you understand how to care for your wound before you leave the hospital.  Always clean your hands before and after caring for your wound.  Before you go home, make sure you know who to contact if you have questions or problems after you get home.  If you have any symptoms of an infection, such as redness and pain at the surgery site, drainage, or fever, call your doctor immediately.    If you have additional questions, please ask your doctor or nurse.                Call office [phone] if you have:  Temperature greater than 100.4  Persistent nausea and vomiting  Severe uncontrolled pain  Redness, tenderness, or signs of infection (pain, swelling, redness, odor or green/yellow discharge around the site)  Difficulty breathing, headache or visual disturbances  Hives  Persistent dizziness or light-headedness  Extreme fatigue  Any other questions or concerns you may have after discharge    In an emergency, call 911 or go to an Emergency Department at a nearby hospital    It is important to bring a complete, current list of your medications to any medical appointments or hospitalizations.    REMINDER:   Carry a list of your medications and allergies with you at all times  Call your pharmacy at least 1 week in advance to refill prescriptions    Diet: Resume your usual diet. Good nutrition promotes healing. Increase fluid intake.     Activity: Rest for 24 hrs then resume normal activity.        Education Materials Received: {yes/no:361513}  Belongings Returned: {yes/no:526446}          I understand and acknowledge receipt of the above instructions.                                                                                                                                          Patient or Guardian  Signature                                                         Date/Time                                                                                                                                            Physician's or R.N.'s Signature                                                                  Date/Time      The discharge instructions have been reviewed with the patient and/or Guardian. Patient and/or Guardian signed and retained a printed copy.

## 2023-02-07 ENCOUNTER — TELEPHONE (OUTPATIENT)
Dept: PHYSICAL MEDICINE AND REHAB | Age: 63
End: 2023-02-07

## 2023-02-07 NOTE — TELEPHONE ENCOUNTER
Pt. Contacted. Procedure and follow up rescheduled due to provider out of office. Verbalized understanding.

## 2023-03-06 ENCOUNTER — PREP FOR PROCEDURE (OUTPATIENT)
Dept: PHYSICAL MEDICINE AND REHAB | Age: 63
End: 2023-03-06

## 2023-03-07 ENCOUNTER — APPOINTMENT (OUTPATIENT)
Dept: GENERAL RADIOLOGY | Age: 63
End: 2023-03-07
Attending: PAIN MEDICINE
Payer: MEDICARE

## 2023-03-07 ENCOUNTER — ANESTHESIA (OUTPATIENT)
Dept: OPERATING ROOM | Age: 63
End: 2023-03-07
Payer: MEDICARE

## 2023-03-07 ENCOUNTER — ANESTHESIA EVENT (OUTPATIENT)
Dept: OPERATING ROOM | Age: 63
End: 2023-03-07
Payer: MEDICARE

## 2023-03-07 ENCOUNTER — HOSPITAL ENCOUNTER (OUTPATIENT)
Age: 63
Setting detail: OUTPATIENT SURGERY
Discharge: HOME | End: 2023-03-07
Attending: PAIN MEDICINE | Admitting: PAIN MEDICINE
Payer: MEDICARE

## 2023-03-07 VITALS
DIASTOLIC BLOOD PRESSURE: 59 MMHG | RESPIRATION RATE: 16 BRPM | OXYGEN SATURATION: 94 % | HEART RATE: 85 BPM | SYSTOLIC BLOOD PRESSURE: 93 MMHG | WEIGHT: 163 LBS | TEMPERATURE: 96.8 F | HEIGHT: 66 IN | BODY MASS INDEX: 26.2 KG/M2

## 2023-03-07 PROBLEM — M17.12 PRIMARY OSTEOARTHRITIS OF LEFT KNEE: Status: ACTIVE | Noted: 2023-03-07

## 2023-03-07 PROCEDURE — 3600000054 HC PAIN LEVEL 3 BASE: Performed by: PAIN MEDICINE

## 2023-03-07 PROCEDURE — 6360000002 HC RX W HCPCS: Performed by: NURSE ANESTHETIST, CERTIFIED REGISTERED

## 2023-03-07 PROCEDURE — 3700000000 HC ANESTHESIA ATTENDED CARE: Performed by: PAIN MEDICINE

## 2023-03-07 PROCEDURE — 2500000003 HC RX 250 WO HCPCS: Performed by: PAIN MEDICINE

## 2023-03-07 PROCEDURE — 7100000010 HC PHASE II RECOVERY - FIRST 15 MIN: Performed by: PAIN MEDICINE

## 2023-03-07 PROCEDURE — 2709999900 HC NON-CHARGEABLE SUPPLY: Performed by: PAIN MEDICINE

## 2023-03-07 PROCEDURE — 7100000011 HC PHASE II RECOVERY - ADDTL 15 MIN: Performed by: PAIN MEDICINE

## 2023-03-07 PROCEDURE — 2500000003 HC RX 250 WO HCPCS: Performed by: NURSE ANESTHETIST, CERTIFIED REGISTERED

## 2023-03-07 PROCEDURE — 3209999900 FLUORO FOR SURGICAL PROCEDURES

## 2023-03-07 RX ORDER — LIDOCAINE HYDROCHLORIDE 10 MG/ML
INJECTION, SOLUTION INFILTRATION; PERINEURAL PRN
Status: DISCONTINUED | OUTPATIENT
Start: 2023-03-07 | End: 2023-03-07 | Stop reason: ALTCHOICE

## 2023-03-07 RX ORDER — PROPOFOL 10 MG/ML
INJECTION, EMULSION INTRAVENOUS PRN
Status: DISCONTINUED | OUTPATIENT
Start: 2023-03-07 | End: 2023-03-07 | Stop reason: SDUPTHER

## 2023-03-07 RX ORDER — LIDOCAINE HYDROCHLORIDE 10 MG/ML
INJECTION, SOLUTION INFILTRATION; PERINEURAL PRN
Status: DISCONTINUED | OUTPATIENT
Start: 2023-03-07 | End: 2023-03-07 | Stop reason: SDUPTHER

## 2023-03-07 RX ORDER — BACLOFEN 10 MG/1
10 TABLET ORAL 2 TIMES DAILY
COMMUNITY

## 2023-03-07 RX ORDER — BUPIVACAINE HYDROCHLORIDE 2.5 MG/ML
INJECTION, SOLUTION EPIDURAL; INFILTRATION; INTRACAUDAL PRN
Status: DISCONTINUED | OUTPATIENT
Start: 2023-03-07 | End: 2023-03-07 | Stop reason: ALTCHOICE

## 2023-03-07 RX ORDER — ISOSORBIDE MONONITRATE 30 MG/1
30 TABLET, EXTENDED RELEASE ORAL DAILY
COMMUNITY

## 2023-03-07 RX ADMIN — PROPOFOL 50 MG: 10 INJECTION, EMULSION INTRAVENOUS at 08:54

## 2023-03-07 RX ADMIN — PROPOFOL 50 MG: 10 INJECTION, EMULSION INTRAVENOUS at 08:51

## 2023-03-07 RX ADMIN — LIDOCAINE HYDROCHLORIDE 50 MG: 10 INJECTION, SOLUTION INFILTRATION; PERINEURAL at 08:51

## 2023-03-07 RX ADMIN — PROPOFOL 50 MG: 10 INJECTION, EMULSION INTRAVENOUS at 08:56

## 2023-03-07 ASSESSMENT — PAIN SCALES - GENERAL: PAINLEVEL_OUTOF10: 0

## 2023-03-07 ASSESSMENT — PAIN DESCRIPTION - DESCRIPTORS: DESCRIPTORS: ACHING

## 2023-03-07 ASSESSMENT — PAIN - FUNCTIONAL ASSESSMENT: PAIN_FUNCTIONAL_ASSESSMENT: 0-10

## 2023-03-07 NOTE — PROGRESS NOTES
4918 Pt to phase 2 recovery per cart. Pt arouses to name. No bleeding noted at left knee injection site. Denies pain. Report from [facility].  7751 Ice applied to left knee. 1174 Pt taking offered snack. Denies complaints. 5743  called - message left. Pt dressing on cart.

## 2023-03-07 NOTE — ANESTHESIA POSTPROCEDURE EVALUATION
Department of Anesthesiology  Postprocedure Note    Patient: Steffi Lindo  MRN: [id number]  YOB: 1960  Date of evaluation: 3/7/2023      Procedure Summary     Date: 03/07/23 Room / Location: [facility] 03 / [facility]    Anesthesia Start: 9218 Anesthesia Stop: 8672    Procedure: left knee genicular nerve block (Left) Diagnosis:       Primary osteoarthritis of left knee      (Primary osteoarthritis of left knee)    Surgeons: Chao Morales MD Responsible Provider: Ana Hanson DO    Anesthesia Type: MAC ASA Status: 2          Anesthesia Type: MAC    Kimberlee Phase I:      Kimberlee Phase II: Kimberlee Score: 9      Anesthesia Post Evaluation    Patient location during evaluation: bedside  Patient participation: complete - patient participated  Level of consciousness: awake  Airway patency: patent  Nausea & Vomiting: no nausea  Complications: no  Cardiovascular status: hemodynamically stable  Respiratory status: acceptable  Hydration status: stable

## 2023-03-07 NOTE — OP NOTE
Pre-Procedure Note    Patient Name: Alvina Win   YOB: 1960  Medical Record Number: [id number]  Date: 3/7/23       Indication:  Left knee pain    Consent: On file. Vital Signs:   Vitals:    03/07/23 0735   BP: 139/80   Pulse: 80   Resp: 16   Temp: 97.2 °F (36.2 °C)   SpO2: 98%       Past Medical History:   has a past medical history of Anxiety, Chronic back pain, COPD (chronic obstructive pulmonary disease) ([facility] [address] Depression, Disc disorder, Emphysema of lung ([facility] [address] Fibromyalgia, Hyperlipidemia, Hypertension, Osteoarthritis, and Osteoporosis. Past Surgical History:   has a past surgical history that includes Tonsillectomy (1972); Knee arthroscopy (Bilateral, 2019); Facet joint injection (Bilateral, 5/14/2020); Facet joint injection (Bilateral, 6/17/2020); Pain management procedure (Bilateral, 7/22/2020); Pain management procedure (Right, 10/7/2020); Pain management procedure (Left, 11/4/2020); Facet joint injection (Bilateral, 1/20/2021); Pain management procedure (Left, 6/8/2021); Pain management procedure (Right, 7/27/2021); Facet joint injection (Bilateral, 12/21/2021); Facet joint injection (Bilateral, 2/11/2022); Pain management procedure (Bilateral, 10/4/2022); and Pain management procedure (Bilateral, 12/20/2022). Pre-Sedation Documentation and Exam:   Vital signs have been reviewed (see flow sheet for vitals). Sedation/ Anesthesia Plan:   MAC    Patient is an appropriate candidate for plan of sedation: yes    Preoperative Diagnosis: Osteoarthritis of the Left knee. Postoperative Diagnosis: Osteoarthritis of the Left knee. Procedure Performed:  Genicular nerve block Left knee under fluoroscopy    Indication for the Procedure: The patient has Left knee pain not responding to conservative treatment. Examination showed decreased range of motion, swelling and effusion. Tenderness was found. Medial joint line and lateral joint line tenderness was noted.   Hence we decided to inject the Left knee joint for the pain relief. The procedure and the risks were discussed with the patient and an informed consent was obtained. Procedure: The patient is placed in supine. The Left knee was flexed to about 90 degrees. Landmarks under fluoroscopy identified. Skin over the Left knee was prepped with Betadine and draped in sterile manner. Landmarks are identified under fluoroscopy guidance and marked ( Superior lateral genicular nerve at the shaft of femur and epicondyle. Then the superior medial genicular nerve at the shaft of femur and epicondyle. Then inferior medial genicular nerve at the tibial shaft, epicondyle/tibia). Subcutaneous infiltration with 1% xylocaine 2.cc is injected at each site. Then under fluoroscopy guidance introduced a 22 g spinal needle at above sites. Needle placement was confirmed by lateral and AP views. After negative aspiration, injected 3 cc of 0.25% marcaine in divided doses  A total of 3 cc of 0.25% marcaine was used. The needles removed and band-aid placed. Patient tolerated the procedure well and the vital signs remained stable. EBL-0  Patient will be seen in the pain clinic for follow up and further planning.     Electronically signed by Michael Jenkins MD on 3/7/23 at 8:50 AM EST

## 2023-03-07 NOTE — H&P
H&P    Patient continues to have pain in left knee and states now this is main complaint- aching, stiff throbbing and stabbing increased with standing and walking. Reveiwed left knee xray    Pain increases with bending, lifting, twisting , walking, standing, getting up and down, and housework or working at job. Continues Ibuprofen prn and Robaxin      Radiology:  Left knee pain:   FINDINGS:   There is prominent medial compartment joint space narrowing. There are small osteophytes at the medial margin of the knee and at the patellar margin. No fracture is evident. There is a small joint effusion. Impression    Moderate medial compartment osteoarthritis and mild degenerative changes of the patellofemoral joint. Medications reviewed. Patient denies side effects with medications. Patient states she is taking medications as prescribed. Shedenies receiving pain medications from other sources. She denies any ER visits since last visit. Pain scale with out pain medications or at its worst is 6/10. Left knee 1-2/10 neck            The patientis allergic to nsaids, pcn [penicillins], and medrol [methylprednisolone]. Subjective:      Review of Systems   Constitutional: Negative. Respiratory: Negative. Cardiovascular: Negative. Musculoskeletal:  Positive for arthralgias, gait problem, joint swelling, myalgias and neck stiffness. Negative for back pain and neck pain. No assist devices    Neurological:  Positive for weakness. Negative for numbness and headaches. Psychiatric/Behavioral: Negative. Negative for sleep disturbance. Objective:          Vitals:     01/03/23 0736   BP: 130/78   Site: Left Upper Arm   Position: Sitting   Weight: 163 lb (73.9 kg)   Height: 5' 6\" (1.676 m)         Physical Exam  Vitals and nursing note reviewed. Constitutional:       General: She is not in acute distress. Appearance: Normal appearance. She is well-developed.  She is not diaphoretic. HENT:      Head: Normocephalic and atraumatic. Right Ear: External ear normal.      Left Ear: External ear normal.      Nose: Nose normal.      Mouth/Throat:      Pharynx: No oropharyngeal exudate. Eyes:      General: No scleral icterus. Right eye: No discharge. Left eye: No discharge. Conjunctiva/sclera: Conjunctivae normal.      Pupils: Pupils are equal, round, and reactive to light. Neck:      Thyroid: No thyromegaly. Cardiovascular:      Rate and Rhythm: Normal rate and regular rhythm. Heart sounds: Normal heart sounds. No murmur heard. No friction rub. No gallop. Pulmonary:      Effort: Pulmonary effort is normal. No respiratory distress. Breath sounds: Normal breath sounds. No wheezing or rales. Chest:      Chest wall: No tenderness. Abdominal:      General: Bowel sounds are normal. There is no distension. Palpations: Abdomen is soft. Tenderness: There is no abdominal tenderness. There is no guarding or rebound. Musculoskeletal:         General: Tenderness present. Cervical back: Rigidity present. No edema, erythema, spasms, tenderness or bony tenderness. No pain with movement or muscular tenderness. Normal range of motion. Thoracic back: Tenderness present. Lumbar back: Tenderness and bony tenderness present. Decreased range of motion. Negative right straight leg raise test and negative left straight leg raise test.      Left knee: Bony tenderness present. No swelling. Decreased range of motion. Tenderness present. Skin:     General: Skin is warm. Coloration: Skin is not pale. Findings: No erythema or rash. Neurological:      Mental Status: She is alert and oriented to person, place, and time. She is not disoriented. Cranial Nerves: No cranial nerve deficit. Sensory: No sensory deficit. Motor: Weakness present. No atrophy or abnormal muscle tone.       Coordination: Coordination normal. Gait: Gait normal.      Deep Tendon Reflexes: Reflexes are normal and symmetric. Babinski sign absent on the right side. Babinski sign absent on the left side. Psychiatric:         Attention and Perception: Attention normal. She is attentive. Mood and Affect: Mood normal. Mood is not anxious or depressed. Affect is not labile, blunt, angry or inappropriate. Speech: Speech normal. She is communicative. Speech is not rapid and pressured, delayed, slurred or tangential.         Behavior: Behavior normal. Behavior is not agitated, slowed, aggressive, withdrawn, hyperactive or combative. Behavior is cooperative. Thought Content: Thought content normal. Thought content is not paranoid or delusional. Thought content does not include homicidal or suicidal ideation. Thought content does not include homicidal or suicidal plan. Cognition and Memory: Cognition normal. Memory is not impaired. She does not exhibit impaired recent memory or impaired remote memory. Judgment: Judgment normal. Judgment is not impulsive or inappropriate. BETHANY  Patricks test  positive  Yeoman's  or Gaenslen's positive        Assessment:      1. Primary osteoarthritis of left knee    2. Chronic pain of left knee    3. Spondylosis of cervical region without myelopathy or radiculopathy    4. Neck pain    5. Lumbar spondylosis    6. Neuroforaminal stenosis of lumbar spine    7. Chronic pain syndrome    8. Chronic myofascial pain       Plan:      OARRS reviewed. Current MED: 0  Patient was not offered naloxone for home. Discussed long term side effects of medications, tolerance, dependency and addiction. Previous UDS reviewed  UDS preformed today for compliance. Patient told can not receive any pain medications from any other source. No evidence of abuse, diversion or aberrant behavior.   Medications and/or procedures to improve function and quality of life- patient understanding with this and that may not be pain free  Discussed with patient about safe storage of medications at home  Discussed possible weaning of medication dosing dependent on treatment/procedure results. Discussed with patient about risks with procedure including infection, reaction to medication, increased pain, or bleeding. Procedure notes reviewed in detail   Receiving over 95% relief of neck pain from C-facet RFA. Continues relief of over 90% from L-facet RFA. Reviewed left knee xray with patient   Plan left knee genicular nerve block for diagnostic purpose. Procedure discussed in detail. With plan for RFA for longer term pain relief in future if effective short tern relief. Needs to hold baby aspirin and Ibuprofen   Has followed at [facility] and had steroid and gel injections in past without relief   Continue Ibuprofen prn and Robaxin for muscle spasms from pcp        Return for left knee genicular nerve block. , follow up after procedure.

## 2023-03-07 NOTE — ANESTHESIA PRE PROCEDURE
Department of Anesthesiology  Preprocedure Note       Name:  Deonte Funez   Age:  57 y.o.  :  1960                                          MRN:  [id number]         Date:  3/7/2023      Surgeon: Zayra Epps):  Michael Jenkins MD    Procedure: Procedure(s):  left knee genicular nerve block    Medications prior to admission:   Prior to Admission medications    Medication Sig Start Date End Date Taking? Authorizing Provider   predniSONE (DELTASONE) 20 MG tablet  22   Historical Provider, MD   methocarbamol (ROBAXIN) 750 MG tablet Take 750 mg by mouth 4 times daily    Historical Provider, MD   loratadine (CLARITIN) 10 MG tablet Take 10 mg by mouth daily Indications: Pt states she takes in spring and summer only    Historical Provider, MD   aspirin 81 MG tablet Take 81 mg by mouth daily    Historical Provider, MD   dilTIAZem (CARDIZEM) 30 MG tablet take 1 tablet by mouth twice a day 20   Historical Provider, MD   lisinopril (PRINIVIL;ZESTRIL) 5 MG tablet  20   Historical Provider, MD ANSARI VITAMIN D-3 125 MCG (5000 UT) CAPS capsule  20   Historical Provider, MD   calcium carbonate 1500 (600 Ca) MG TABS tablet  20   Historical Provider, MD   albuterol sulfate  (90 Base) MCG/ACT inhaler Indications: Pt states she takes once in a while 20   Historical Provider, MD   ibuprofen (ADVIL;MOTRIN) 800 MG tablet Take 1 tablet by mouth 3 times daily 16   Historical Provider, MD   amitriptyline (ELAVIL) 50 MG tablet Take 1 tablet by mouth daily 16   Historical Provider, MD   Ascorbic Acid (VITAMIN C) 500 MG tablet Take by mouth daily    Historical Provider, MD   VITAMIN D, CHOLECALCIFEROL, PO Take 1 tablet by mouth daily    Historical Provider, MD       Current medications:    No current facility-administered medications for this encounter. Allergies:     Allergies   Allergen Reactions    Nsaids Other (See Comments)     STOPPED BREATHING    Pcn [Penicillins] Other (See Comments)     DIZZINESS    Medrol [Methylprednisolone] Palpitations       Problem List:    Patient Active Problem List   Diagnosis Code    PVD (peripheral vascular disease) ([facility] [address] I73.9    Lumbar spondylosis M47.816    Cervical spondylosis M47.812       Past Medical History:        Diagnosis Date    Anxiety     Chronic back pain     COPD (chronic obstructive pulmonary disease) ([facility] [address]     Depression     Disc disorder     Emphysema of lung ([facility] [address]     Fibromyalgia     Hyperlipidemia     Hypertension     Osteoarthritis     Osteoporosis        Past Surgical History:        Procedure Laterality Date    FACET JOINT INJECTION Bilateral 5/14/2020    bilat. L3, L4 medial branch and L5 dorsal rami injection performed by Aydin Victoria MD at [place] [address] Bilateral 6/17/2020    bilateral C5-6-7 medial branch block performed by Aydin Victoria MD at [address] INJECTION Bilateral 1/20/2021    bilateral C5-6-7 medial branch block performed by Aydin Victoria MD at [facility] INJECTION Bilateral 12/21/2021    bilateral C-facet MBB # 2 @ C5-6, and C6-7 for diagnostic purpose performed by Gretta Richards MD at [address] INJECTION Bilateral 2/11/2022    bilateral C-facet RFA @ C5-6, and C6-7. performed by Gretta Richards MD at [address] [place] [address] ARTHROSCOPY Bilateral 2019    PAIN MANAGEMENT PROCEDURE Bilateral 7/22/2020    bilateral L3-4 medial branch L5 dorsal rami performed by Aydin Victoria MD at [facility] Right 10/7/2020    RFA, L3-4 medial branch and L5 dorsal rami, right. performed by Aydin Victoria MD at [facility] Left 11/4/2020    RFA, L3-4 medial branch and L5 dorsal rami, left.  performed by Aydin Victoria MD at [facility] PROCEDURE Left 6/8/2021    L-facet RFA's @ L3-4,L4-5 and L5-S1  Left performed by Dean Flores MD at [place] [address] Right 7/27/2021    right L-facet RFA @ L3-4, L4-5, and L5-S1 performed by Dean Flores MD at [place] [address] Bilateral 10/4/2022    Bilateral Lumar Facet Radiofrequency Ablation Lumbar 4-5, 5-Sacral1 performed by Dean Flores MD at [place] [address] Bilateral 12/20/2022    Bilateral cervical facet radiofrequency ablation Cervical 5-6, 6-7 performed by Dean Flores MD at [address] [place] [address]       Social History:    Social History     Tobacco Use    Smoking status: Every Day     Packs/day: 1.00     Types: Cigarettes    Smokeless tobacco: Never   Substance Use Topics    Alcohol use: Yes     Comment: 2 BEERS A MONTH                                 Ready to quit: Not Answered  Counseling given: Not Answered      Vital Signs (Current):   Vitals:    03/07/23 0735   BP: 139/80   Pulse: 80   Resp: 16   Temp: 97.2 °F (36.2 °C)   TempSrc: Temporal   SpO2: 98%   Weight: 163 lb (73.9 kg)   Height: 5' 6\" (1.676 m)                                              BP Readings from Last 3 Encounters:   03/07/23 139/80   01/03/23 130/78   12/20/22 127/82       NPO Status: Time of last liquid consumption: 1830                        Time of last solid consumption: 1830                        Date of last liquid consumption: 03/06/23                        Date of last solid food consumption: 03/06/23    BMI:   Wt Readings from Last 3 Encounters:   03/07/23 163 lb (73.9 kg)   01/03/23 163 lb (73.9 kg)   11/08/22 163 lb (73.9 kg)     Body mass index is 26.31 kg/m².     CBC: No results found for: WBC, RBC, HGB, HCT, MCV, RDW, PLT    CMP: No results found for: NA, K, CL, CO2, BUN, CREATININE, GFRAA, AGRATIO, LABGLOM, GLUCOSE, GLU, PROT, CALCIUM, BILITOT, ALKPHOS, AST, ALT    POC Tests: No results for input(s): POCGLU, POCNA, POCK, POCCL, POCBUN, POCHEMO, POCHCT in the last 72 hours. Coags: No results found for: PROTIME, INR, APTT    HCG (If Applicable): No results found for: PREGTESTUR, PREGSERUM, HCG, HCGQUANT     ABGs: No results found for: PHART, PO2ART, EEI2HNL, OJT7XUV, BEART, T0GQTNMI     Type & Screen (If Applicable):  No results found for: LABABO, LABRH    Drug/Infectious Status (If Applicable):  No results found for: HIV, HEPCAB    COVID-19 Screening (If Applicable):   Lab Results   Component Value Date/Time    COVID19 Not Detected 07/17/2020 10:27 AM           Anesthesia Evaluation  Patient summary reviewed and Nursing notes reviewed  Airway: Mallampati: II          Dental:          Pulmonary: breath sounds clear to auscultation  (+) COPD:                             Cardiovascular:  Exercise tolerance: no interval change,   (+) hypertension:,         Rhythm: regular  Rate: normal                    Neuro/Psych:   (+) neuromuscular disease:, psychiatric history:            GI/Hepatic/Renal:             Endo/Other:                     Abdominal:             Vascular: Other Findings:           Anesthesia Plan      MAC     ASA 2       Induction: intravenous. Anesthetic plan and risks discussed with patient. Plan discussed with CRNA.                     Noreen Gale DO   3/7/2023

## 2023-03-07 NOTE — H&P
[facility]  History and Physical Update    Pt Name: Lamberto Pimentel  MRN: [id number]  YOB: 1960  Date of evaluation: 3/7/2023      I have examined the patient and reviewed the H&P/Consult and there are no changes to the patient or plans.         Electronically signed by Racquel Sosa MD on 3/7/2023 at 8:49 AM I have personally performed a face to face diagnostic evaluation on this patient. I have reviewed the ACP note and agree with the history, exam and plan of care, except as noted.

## 2023-03-21 ENCOUNTER — TELEPHONE (OUTPATIENT)
Dept: PHYSICAL MEDICINE AND REHAB | Age: 63
End: 2023-03-21

## 2023-03-21 ENCOUNTER — OFFICE VISIT (OUTPATIENT)
Dept: PHYSICAL MEDICINE AND REHAB | Age: 63
End: 2023-03-21

## 2023-03-21 VITALS
SYSTOLIC BLOOD PRESSURE: 120 MMHG | BODY MASS INDEX: 26.2 KG/M2 | WEIGHT: 163 LBS | DIASTOLIC BLOOD PRESSURE: 84 MMHG | HEIGHT: 66 IN

## 2023-03-21 DIAGNOSIS — G89.4 CHRONIC PAIN SYNDROME: ICD-10-CM

## 2023-03-21 DIAGNOSIS — M47.812 SPONDYLOSIS OF CERVICAL REGION WITHOUT MYELOPATHY OR RADICULOPATHY: ICD-10-CM

## 2023-03-21 DIAGNOSIS — G89.29 CHRONIC PAIN OF LEFT KNEE: ICD-10-CM

## 2023-03-21 DIAGNOSIS — M47.816 LUMBAR SPONDYLOSIS: ICD-10-CM

## 2023-03-21 DIAGNOSIS — M79.18 CHRONIC MYOFASCIAL PAIN: ICD-10-CM

## 2023-03-21 DIAGNOSIS — M48.061 NEUROFORAMINAL STENOSIS OF LUMBAR SPINE: ICD-10-CM

## 2023-03-21 DIAGNOSIS — M54.2 NECK PAIN: ICD-10-CM

## 2023-03-21 DIAGNOSIS — M25.562 CHRONIC PAIN OF LEFT KNEE: ICD-10-CM

## 2023-03-21 DIAGNOSIS — M17.12 PRIMARY OSTEOARTHRITIS OF LEFT KNEE: Primary | ICD-10-CM

## 2023-03-21 DIAGNOSIS — G89.29 CHRONIC MYOFASCIAL PAIN: ICD-10-CM

## 2023-03-21 ASSESSMENT — ENCOUNTER SYMPTOMS
RESPIRATORY NEGATIVE: 1
BACK PAIN: 0

## 2023-03-21 NOTE — PROGRESS NOTES
Continues tylenol prn   Prior Injections:   left knee genicular nerve block from 3/7/2023        Medications reviewed. Patient denies side effects with medications. Patient states she is taking medications as prescribed. Shedenies receiving pain medications from other sources. She denies any ER visits since last visit. Pain scale with out pain medications or at its worst is 3/10, sitting, can get up to 6-7/10 with walking         The patientis allergic to nsaids, pcn [penicillins], and medrol [methylprednisolone]. Subjective:      Review of Systems   Constitutional: Negative. Respiratory: Negative. Cardiovascular: Negative. Musculoskeletal:  Positive for arthralgias, gait problem, joint swelling, myalgias and neck stiffness. Negative for back pain and neck pain. No assist devices    Neurological:  Positive for weakness. Negative for numbness and headaches. Psychiatric/Behavioral: Negative. Negative for sleep disturbance. Objective:     Vitals:    03/21/23 0937   BP: 120/84   Weight: 163 lb (73.9 kg)   Height: 5' 6\" (1.676 m)       Physical Exam  Vitals and nursing note reviewed. Constitutional:       General: She is not in acute distress. Appearance: Normal appearance. She is well-developed. She is not diaphoretic. HENT:      Head: Normocephalic and atraumatic. Right Ear: External ear normal.      Left Ear: External ear normal.      Nose: Nose normal.      Mouth/Throat:      Pharynx: No oropharyngeal exudate. Eyes:      General: No scleral icterus. Right eye: No discharge. Left eye: No discharge. Conjunctiva/sclera: Conjunctivae normal.      Pupils: Pupils are equal, round, and reactive to light. Neck:      Thyroid: No thyromegaly. Cardiovascular:      Rate and Rhythm: Normal rate and regular rhythm. Heart sounds: Normal heart sounds. No murmur heard. No friction rub. No gallop.    Pulmonary:      Effort: Pulmonary effort is normal. No

## 2023-05-11 ENCOUNTER — PREP FOR PROCEDURE (OUTPATIENT)
Dept: PHYSICAL MEDICINE AND REHAB | Age: 63
End: 2023-05-11

## 2023-05-22 ENCOUNTER — APPOINTMENT (OUTPATIENT)
Dept: GENERAL RADIOLOGY | Age: 63
End: 2023-05-22
Attending: PAIN MEDICINE
Payer: MEDICARE

## 2023-05-22 ENCOUNTER — ANESTHESIA EVENT (OUTPATIENT)
Dept: OPERATING ROOM | Age: 63
End: 2023-05-22
Payer: MEDICARE

## 2023-05-22 ENCOUNTER — ANESTHESIA (OUTPATIENT)
Dept: OPERATING ROOM | Age: 63
End: 2023-05-22
Payer: MEDICARE

## 2023-05-22 ENCOUNTER — HOSPITAL ENCOUNTER (OUTPATIENT)
Age: 63
Setting detail: OUTPATIENT SURGERY
Discharge: HOME | End: 2023-05-22
Attending: PAIN MEDICINE | Admitting: PAIN MEDICINE
Payer: MEDICARE

## 2023-05-22 VITALS
BODY MASS INDEX: 26.03 KG/M2 | WEIGHT: 162 LBS | DIASTOLIC BLOOD PRESSURE: 79 MMHG | TEMPERATURE: 97.4 F | SYSTOLIC BLOOD PRESSURE: 131 MMHG | OXYGEN SATURATION: 97 % | HEIGHT: 66 IN | RESPIRATION RATE: 16 BRPM | HEART RATE: 95 BPM

## 2023-05-22 PROCEDURE — 3600000054 HC PAIN LEVEL 3 BASE: Performed by: PAIN MEDICINE

## 2023-05-22 PROCEDURE — 2720000010 HC SURG SUPPLY STERILE: Performed by: PAIN MEDICINE

## 2023-05-22 PROCEDURE — 3700000000 HC ANESTHESIA ATTENDED CARE: Performed by: PAIN MEDICINE

## 2023-05-22 PROCEDURE — 7100000011 HC PHASE II RECOVERY - ADDTL 15 MIN: Performed by: PAIN MEDICINE

## 2023-05-22 PROCEDURE — 3600000055 HC PAIN LEVEL 3 ADDL 15 MIN: Performed by: PAIN MEDICINE

## 2023-05-22 PROCEDURE — 2500000003 HC RX 250 WO HCPCS: Performed by: PAIN MEDICINE

## 2023-05-22 PROCEDURE — 6360000002 HC RX W HCPCS: Performed by: NURSE ANESTHETIST, CERTIFIED REGISTERED

## 2023-05-22 PROCEDURE — 2709999900 HC NON-CHARGEABLE SUPPLY: Performed by: PAIN MEDICINE

## 2023-05-22 PROCEDURE — 7100000010 HC PHASE II RECOVERY - FIRST 15 MIN: Performed by: PAIN MEDICINE

## 2023-05-22 PROCEDURE — 3700000001 HC ADD 15 MINUTES (ANESTHESIA): Performed by: PAIN MEDICINE

## 2023-05-22 PROCEDURE — 3209999900 FLUORO FOR SURGICAL PROCEDURES

## 2023-05-22 PROCEDURE — 2500000003 HC RX 250 WO HCPCS: Performed by: NURSE ANESTHETIST, CERTIFIED REGISTERED

## 2023-05-22 RX ORDER — LIDOCAINE HYDROCHLORIDE 10 MG/ML
INJECTION, SOLUTION EPIDURAL; INFILTRATION; INTRACAUDAL; PERINEURAL PRN
Status: DISCONTINUED | OUTPATIENT
Start: 2023-05-22 | End: 2023-05-22 | Stop reason: ALTCHOICE

## 2023-05-22 RX ORDER — BUPIVACAINE HYDROCHLORIDE 2.5 MG/ML
INJECTION, SOLUTION EPIDURAL; INFILTRATION; INTRACAUDAL PRN
Status: DISCONTINUED | OUTPATIENT
Start: 2023-05-22 | End: 2023-05-22 | Stop reason: ALTCHOICE

## 2023-05-22 RX ORDER — PROPOFOL 10 MG/ML
INJECTION, EMULSION INTRAVENOUS PRN
Status: DISCONTINUED | OUTPATIENT
Start: 2023-05-22 | End: 2023-05-22 | Stop reason: SDUPTHER

## 2023-05-22 RX ORDER — LIDOCAINE HYDROCHLORIDE 20 MG/ML
INJECTION, SOLUTION EPIDURAL; INFILTRATION; INTRACAUDAL; PERINEURAL PRN
Status: DISCONTINUED | OUTPATIENT
Start: 2023-05-22 | End: 2023-05-22 | Stop reason: SDUPTHER

## 2023-05-22 RX ORDER — PANTOPRAZOLE SODIUM 40 MG/1
40 GRANULE, DELAYED RELEASE ORAL
COMMUNITY

## 2023-05-22 RX ORDER — FAMOTIDINE 40 MG/1
40 TABLET, FILM COATED ORAL DAILY
COMMUNITY

## 2023-05-22 RX ADMIN — LIDOCAINE HYDROCHLORIDE 40 MG: 20 INJECTION, SOLUTION EPIDURAL; INFILTRATION; INTRACAUDAL; PERINEURAL at 08:01

## 2023-05-22 RX ADMIN — PROPOFOL 20 MG: 10 INJECTION, EMULSION INTRAVENOUS at 08:16

## 2023-05-22 RX ADMIN — PROPOFOL 50 MG: 10 INJECTION, EMULSION INTRAVENOUS at 08:05

## 2023-05-22 RX ADMIN — PROPOFOL 30 MG: 10 INJECTION, EMULSION INTRAVENOUS at 08:15

## 2023-05-22 RX ADMIN — PROPOFOL 50 MG: 10 INJECTION, EMULSION INTRAVENOUS at 08:02

## 2023-05-22 RX ADMIN — PROPOFOL 50 MG: 10 INJECTION, EMULSION INTRAVENOUS at 08:09

## 2023-05-22 ASSESSMENT — PAIN DESCRIPTION - DESCRIPTORS: DESCRIPTORS: DULL;GNAWING;DISCOMFORT

## 2023-05-22 ASSESSMENT — PAIN - FUNCTIONAL ASSESSMENT
PAIN_FUNCTIONAL_ASSESSMENT: PREVENTS OR INTERFERES SOME ACTIVE ACTIVITIES AND ADLS
PAIN_FUNCTIONAL_ASSESSMENT: 0-10

## 2023-05-22 NOTE — ANESTHESIA POSTPROCEDURE EVALUATION
Department of Anesthesiology  Postprocedure Note    Patient: Cody Castellano  MRN: [id number]  YOB: 1960  Date of evaluation: 5/22/2023      Procedure Summary     Date: 05/22/23 Room / Location: [address] [place] [address] 03 / 138 [facility]    Anesthesia Start: 2990 Anesthesia Stop: 6334    Procedure: left knee genicular nerve Radiofrequency ablation (Left) Diagnosis:       Primary osteoarthritis of left knee      (Primary osteoarthritis of left knee)    Surgeons: Rosana Mccrary MD Responsible Provider: Virgen Martin MD    Anesthesia Type: MAC ASA Status: 2          Anesthesia Type: No value filed.     Kimberlee Phase I:      Kimberlee Phase II: Kimberlee Score: 10      Anesthesia Post Evaluation    Complications: no

## 2023-05-22 NOTE — H&P
H&P    Patient had  left knee genicular nerve block from 3/7/2023. States that she received 100% relief fof left knee pain for about 1.5 weeks did not have any pain at all. Has been able to tolerate full activity in left knee with increased ROM. Pain started to return this week still not as bad but is returning- pain in left knee with walking aching pain. Has been walking mor and planting. Still good relief of low back and neck pain from L-facet rFA and C-facet RFA. Pain increases with bending, lifting, twisting , turning head, walking, standing, stairs, getting up and down, and housework or working at job. Continues Ibuprofen only prn, was changed by pcpc to Baclofen and Robaxen was stopped. Continues tylenol prn   Prior Injections:   left knee genicular nerve block from 3/7/2023           Medications reviewed. Patient denies side effects with medications. Patient states she is taking medications as prescribed. Shedenies receiving pain medications from other sources. She denies any ER visits since last visit. Pain scale with out pain medications or at its worst is 3/10, sitting, can get up to 6-7/10 with walking            The patientis allergic to nsaids, pcn [penicillins], and medrol [methylprednisolone]. Subjective:      Review of Systems   Constitutional: Negative. Respiratory: Negative. Cardiovascular: Negative. Musculoskeletal:  Positive for arthralgias, gait problem, joint swelling, myalgias and neck stiffness. Negative for back pain and neck pain. No assist devices    Neurological:  Positive for weakness. Negative for numbness and headaches. Psychiatric/Behavioral: Negative. Negative for sleep disturbance. Objective:      Vitals       Vitals:     03/21/23 0937   BP: 120/84   Weight: 163 lb (73.9 kg)   Height: 5' 6\" (1.676 m)            Physical Exam  Vitals and nursing note reviewed. Constitutional:       General: She is not in acute distress.

## 2023-05-22 NOTE — H&P
[address] [place] [address]  History and Physical Update    Pt Name: Joel Babin  MRN: [id number]  YOB: 1960  Date of evaluation: 5/22/2023      I have examined the patient and reviewed the H&P/Consult and there are no changes to the patient or plans.         Electronically signed by Giancarlo Connors MD on 5/22/2023 at 8:00 AM

## 2023-05-22 NOTE — ANESTHESIA PRE PROCEDURE
Department of Anesthesiology  Preprocedure Note       Name:  Kuldeep Horvath   Age:  57 y.o.  :  1960                                          MRN:  [id number]         Date:  2023      Surgeon: Laurie Helm):  Shay Meléndez MD    Procedure: Procedure(s):  left knee genicular nerve Radiofrequency ablation    Medications prior to admission:   Prior to Admission medications    Medication Sig Start Date End Date Taking? Authorizing Provider   famotidine (PEPCID) 40 MG tablet Take 1 tablet by mouth daily   Yes Historical Provider, MD   pantoprazole sodium (PROTONIX) 40 MG PACK packet Take 1 packet by mouth every morning (before breakfast)   Yes Historical Provider, MD   isosorbide mononitrate (IMDUR) 30 MG extended release tablet Take 1 tablet by mouth daily    Historical Provider, MD   baclofen (LIORESAL) 10 MG tablet Take 1 tablet by mouth 2 times daily    Historical Provider, MD   predniSONE (DELTASONE) 20 MG tablet  22   Historical Provider, MD   aspirin 81 MG tablet Take 1 tablet by mouth daily    Historical Provider, MD   dilTIAZem (CARDIZEM) 30 MG tablet take 1 tablet by mouth twice a day 20   Historical Provider, MD   lisinopril (PRINIVIL;ZESTRIL) 5 MG tablet  20   Historical Provider, MD ANSARI VITAMIN D-3 125 MCG (5000 UT) CAPS capsule  20   Historical Provider, MD   calcium carbonate 1500 (600 Ca) MG TABS tablet  20   Historical Provider, MD   albuterol sulfate  (90 Base) MCG/ACT inhaler Indications: Pt states she takes once in a while 20   Historical Provider, MD   amitriptyline (ELAVIL) 50 MG tablet Take 1 tablet by mouth daily 16   Historical Provider, MD   Ascorbic Acid (VITAMIN C) 500 MG tablet Take by mouth daily    Historical Provider, MD   VITAMIN D, CHOLECALCIFEROL, PO Take 1 tablet by mouth daily    Historical Provider, MD       Current medications:    No current facility-administered medications for this encounter.        Allergies:

## 2023-05-22 NOTE — OP NOTE
Pre-Procedure Note    Patient Name: Nadia Mosley   YOB: 1960  Medical Record Number: [id number]  Date: 5/22/23       Indication:  Left knee pain    Consent: On file. Vital Signs:   Vitals:    05/22/23 0709   BP: 131/79   Pulse: 95   Resp: 16   Temp: 97 °F (36.1 °C)   SpO2: 97%       Past Medical History:   has a past medical history of Anxiety, Chronic back pain, COPD (chronic obstructive pulmonary disease) ([facility] [address] Depression, Disc disorder, Emphysema of lung ([facility] [address] Fibromyalgia, Hyperlipidemia, Hypertension, Osteoarthritis, and Osteoporosis. Past Surgical History:   has a past surgical history that includes Tonsillectomy (1972); Knee arthroscopy (Bilateral, 2019); Facet joint injection (Bilateral, 5/14/2020); Facet joint injection (Bilateral, 6/17/2020); Pain management procedure (Bilateral, 7/22/2020); Pain management procedure (Right, 10/7/2020); Pain management procedure (Left, 11/4/2020); Facet joint injection (Bilateral, 1/20/2021); Pain management procedure (Left, 6/8/2021); Pain management procedure (Right, 7/27/2021); Facet joint injection (Bilateral, 12/21/2021); Facet joint injection (Bilateral, 2/11/2022); Pain management procedure (Bilateral, 10/4/2022); Pain management procedure (Bilateral, 12/20/2022); and Pain management procedure (Left, 3/7/2023). Pre-Sedation Documentation and Exam:   Vital signs have been reviewed (see flow sheet for vitals). Sedation/ Anesthesia Plan:   MAC    Patient is an appropriate candidate for plan of sedation: yes    Preoperative Diagnosis: Osteoarthritis of the Left knee. Postoperative Diagnosis: Osteoarthritis of the Left knee. Procedure Performed:  LEFT  1. Superolateral genicular branch from the vastus lateralis          2. Superomedial genicular branch from the vastus medialis. 3. Inferomedial genicular branch from the saphenous nerve. Indication for the Procedure:   The patient has Left knee pain not responding to

## 2023-05-22 NOTE — PROGRESS NOTES
8405 Received to room   Report received from Shiva Posada 43. Denies c/o pain, nausea, numbness, or tingling. Snack given. Ice to left knee. Bandaids intact. Call light in reach. 0840 IV removed. Dressing self on cart. Patient's  Alla Mishra called   5773 Discharged home in stable condition. Accompanied out to private car, ambulated without difficulty.   Alla Mishra was patient's

## 2023-06-05 ENCOUNTER — OFFICE VISIT (OUTPATIENT)
Dept: PHYSICAL MEDICINE AND REHAB | Age: 63
End: 2023-06-05
Payer: MEDICARE

## 2023-06-05 VITALS
BODY MASS INDEX: 26.03 KG/M2 | SYSTOLIC BLOOD PRESSURE: 110 MMHG | WEIGHT: 162 LBS | HEIGHT: 66 IN | DIASTOLIC BLOOD PRESSURE: 70 MMHG

## 2023-06-05 DIAGNOSIS — M54.2 NECK PAIN: ICD-10-CM

## 2023-06-05 DIAGNOSIS — M48.061 NEUROFORAMINAL STENOSIS OF LUMBAR SPINE: ICD-10-CM

## 2023-06-05 DIAGNOSIS — G89.29 CHRONIC MYOFASCIAL PAIN: ICD-10-CM

## 2023-06-05 DIAGNOSIS — M17.12 PRIMARY OSTEOARTHRITIS OF LEFT KNEE: Primary | ICD-10-CM

## 2023-06-05 DIAGNOSIS — M79.18 CHRONIC MYOFASCIAL PAIN: ICD-10-CM

## 2023-06-05 DIAGNOSIS — G89.4 CHRONIC PAIN SYNDROME: ICD-10-CM

## 2023-06-05 DIAGNOSIS — G89.29 CHRONIC PAIN OF LEFT KNEE: ICD-10-CM

## 2023-06-05 DIAGNOSIS — M47.816 LUMBAR SPONDYLOSIS: ICD-10-CM

## 2023-06-05 DIAGNOSIS — M25.562 CHRONIC PAIN OF LEFT KNEE: ICD-10-CM

## 2023-06-05 DIAGNOSIS — M47.812 SPONDYLOSIS OF CERVICAL REGION WITHOUT MYELOPATHY OR RADICULOPATHY: ICD-10-CM

## 2023-06-05 PROCEDURE — G8419 CALC BMI OUT NRM PARAM NOF/U: HCPCS | Performed by: NURSE PRACTITIONER

## 2023-06-05 PROCEDURE — G8427 DOCREV CUR MEDS BY ELIG CLIN: HCPCS | Performed by: NURSE PRACTITIONER

## 2023-06-05 PROCEDURE — 99214 OFFICE O/P EST MOD 30 MIN: CPT | Performed by: NURSE PRACTITIONER

## 2023-06-05 PROCEDURE — 3017F COLORECTAL CA SCREEN DOC REV: CPT | Performed by: NURSE PRACTITIONER

## 2023-06-05 PROCEDURE — 4004F PT TOBACCO SCREEN RCVD TLK: CPT | Performed by: NURSE PRACTITIONER

## 2023-06-05 RX ORDER — ROPINIROLE 0.25 MG/1
0.25 TABLET, FILM COATED ORAL NIGHTLY
COMMUNITY

## 2023-06-05 ASSESSMENT — ENCOUNTER SYMPTOMS
BACK PAIN: 0
RESPIRATORY NEGATIVE: 1

## 2023-06-05 NOTE — PROGRESS NOTES
[facility]  [address]  Dept: [phone]  Dept Fax: [phone]: [phone]    Visit Date: 6/5/2023    Functionality Assessment/Goals Worksheet     On a scale of 0 (Does not Interfere) to 10 (Completely Interferes)     1. Which number describes how during the past week pain has interfered with       the following:  A. General Activity:  3  B. Mood: 1  C. Walking Ability:  3  D. Normal Work (Includes both work outside the home and housework):  3  E. Relations with Other People:   0  F. Sleep:   0  G. Enjoyment of Life:   0    2. Patient Prefers to Take their Pain Medications:     []  On a regular basis   []  Only when necessary    [x]  Does not take pain medications    3. What are the Patient's Goals/Expectations for Visiting Pain Management? [x]  Learn about my pain    []  Receive Medication   []  Physical Therapy     []  Treat Depression   []  Receive Injections    []  Treat Sleep   []  Deal with Anxiety and Stress   []  Treat Opoid Dependence/Addiction   []  Other:      HPI:   Paula Díaz is a 57 y.o. female is here today for    Chief Complaint: Left knee pain     HPI   FU from left genicular knee RFA from 5/22/2023. Is receiving 95% relief of left knee pain from his procedure. Knee pain is tolerable \"I can stand longer, walk more with minimal pain\". Doing very well. Still good relief of low back and neck pain from L-facet rFA and C-facet RFA. Pain is very minimal. Is tolerating full activity   Pain increases with bending, lifting, turning torso, getting up and down, and housework or working at job. Has not needed any NSAID, states tylenol prn, Remains on Baclofen from pcp     No new complaints     Prior Injections:  left knee genicular nerve block from 3/7/2023  left genicular knee RFA from 5/22/2023      Medications reviewed.  Patient denies side effects

## 2023-11-02 ENCOUNTER — OFFICE VISIT (OUTPATIENT)
Dept: PHYSICAL MEDICINE AND REHAB | Age: 63
End: 2023-11-02
Payer: MEDICARE

## 2023-11-02 VITALS
BODY MASS INDEX: 26.04 KG/M2 | WEIGHT: 162.04 LBS | DIASTOLIC BLOOD PRESSURE: 64 MMHG | SYSTOLIC BLOOD PRESSURE: 128 MMHG | HEIGHT: 66 IN

## 2023-11-02 DIAGNOSIS — M17.12 PRIMARY OSTEOARTHRITIS OF LEFT KNEE: Primary | ICD-10-CM

## 2023-11-02 DIAGNOSIS — M25.562 CHRONIC PAIN OF LEFT KNEE: ICD-10-CM

## 2023-11-02 DIAGNOSIS — M47.812 SPONDYLOSIS OF CERVICAL REGION WITHOUT MYELOPATHY OR RADICULOPATHY: ICD-10-CM

## 2023-11-02 DIAGNOSIS — G89.29 CHRONIC MYOFASCIAL PAIN: ICD-10-CM

## 2023-11-02 DIAGNOSIS — G89.29 CHRONIC PAIN OF LEFT KNEE: ICD-10-CM

## 2023-11-02 DIAGNOSIS — M47.816 LUMBAR FACET ARTHROPATHY: ICD-10-CM

## 2023-11-02 DIAGNOSIS — M79.18 CHRONIC MYOFASCIAL PAIN: ICD-10-CM

## 2023-11-02 DIAGNOSIS — M47.816 LUMBAR SPONDYLOSIS: ICD-10-CM

## 2023-11-02 DIAGNOSIS — M54.2 NECK PAIN: ICD-10-CM

## 2023-11-02 DIAGNOSIS — G89.4 CHRONIC PAIN SYNDROME: ICD-10-CM

## 2023-11-02 PROCEDURE — G8484 FLU IMMUNIZE NO ADMIN: HCPCS | Performed by: NURSE PRACTITIONER

## 2023-11-02 PROCEDURE — 99214 OFFICE O/P EST MOD 30 MIN: CPT | Performed by: NURSE PRACTITIONER

## 2023-11-02 PROCEDURE — 1123F ACP DISCUSS/DSCN MKR DOCD: CPT | Performed by: NURSE PRACTITIONER

## 2023-11-02 PROCEDURE — G8419 CALC BMI OUT NRM PARAM NOF/U: HCPCS | Performed by: NURSE PRACTITIONER

## 2023-11-02 PROCEDURE — 1090F PRES/ABSN URINE INCON ASSESS: CPT | Performed by: NURSE PRACTITIONER

## 2023-11-02 PROCEDURE — 3017F COLORECTAL CA SCREEN DOC REV: CPT | Performed by: NURSE PRACTITIONER

## 2023-11-02 PROCEDURE — 4004F PT TOBACCO SCREEN RCVD TLK: CPT | Performed by: NURSE PRACTITIONER

## 2023-11-02 PROCEDURE — G8427 DOCREV CUR MEDS BY ELIG CLIN: HCPCS | Performed by: NURSE PRACTITIONER

## 2023-11-02 PROCEDURE — G8400 PT W/DXA NO RESULTS DOC: HCPCS | Performed by: NURSE PRACTITIONER

## 2023-11-02 ASSESSMENT — ENCOUNTER SYMPTOMS
RESPIRATORY NEGATIVE: 1
BACK PAIN: 1

## 2023-11-02 NOTE — PROGRESS NOTES
[address]  [facility]  [address] [facility]  Dept: [phone]  Dept Fax: [phone]: [phone]    Visit Date: 11/2/2023    Functionality Assessment/Goals Worksheet     On a scale of 0 (Does not Interfere) to 10 (Completely Interferes)     1. Which number describes how during the past week pain has interfered with       the following:  A. General Activity:  9  B. Mood: 8  C. Walking Ability:  9  D. Normal Work (Includes both work outside the home and housework):  9  E. Relations with Other People:   8  F. Sleep:   9  G. Enjoyment of Life:   8    2. Patient Prefers to Take their Pain Medications:     []  On a regular basis   []  Only when necessary    [x]  Does not take pain medications    3. What are the Patient's Goals/Expectations for Visiting Pain Management? [x]  Learn about my pain    []  Receive Medication   []  Physical Therapy     []  Treat Depression   []  Receive Injections    []  Treat Sleep   []  Deal with Anxiety and Stress   []  Treat Opoid Dependence/Addiction   [x]  Other:discuss procedures for knee, back,neck       HPI:   Leif Lucas is a 70 y.o. female is here today for    Chief Complaint: Left knee pain, low back, neck pain     HPI   Has been about 5 months since last FU. Main complaint today is pain that has been increasing over the past 2-3 weeks in left knee as she reports her relief from her left knee genicular nerve RFA started to wear off. Pain is in left nee throughout bilaterally- sharp and throbbing pain worse with walking. Feels that her knee feels catching with walking. Left leg has been mor stiff as well. Patient reports that she is starting to feel pain in low back aching and more stiffness. Still feels relief from bilateral L-facet RFA but feels that it is waning. Has aching pain in posterior neck but again still relief from C-facet RFA.      Pain

## 2023-11-03 ENCOUNTER — TELEPHONE (OUTPATIENT)
Dept: PHYSICAL MEDICINE AND REHAB | Age: 63
End: 2023-11-03

## 2023-11-28 ENCOUNTER — TELEPHONE (OUTPATIENT)
Dept: PHYSICAL MEDICINE AND REHAB | Age: 63
End: 2023-11-28

## 2023-12-06 NOTE — DISCHARGE INSTRUCTIONS
ANESTHESIA INSTRUCTIONS FOLLOWING SURGERY        Since you may experience some intermittent light-headedness for the next several hours, we suggest you plan on bed rest or quiet relaxation this evening. You must have a friend or relative stay with you tonight. Because of the sedation you have received, it is recommended that you do not drive a motor vehicle, operate any kind of machinery, or sign any contractual agreement for 24 hours following the procedure. You should not take alcoholic beverages tonight and only take sleeping medication that has been specifically prescribed for you by your physician. Call office [phone] if you have:  Temperature greater than 100.4  Persistent nausea and vomiting  Severe uncontrolled pain  Redness, tenderness, or signs of infection (pain, swelling, redness, odor or green/yellow discharge around the site)  Difficulty breathing, headache or visual disturbances  Hives  Persistent dizziness or light-headedness  Extreme fatigue  Any other questions or concerns you may have after discharge    In an emergency, call 911 or go to an Emergency Department at a nearby hospital    It is important to bring a complete, current list of your medications to any medical appointments or hospitalizations. REMINDER:   Carry a list of your medications and allergies with you at all times  Call your pharmacy at least 1 week in advance to refill prescriptions    Diet: Resume your usual diet. Good nutrition promotes healing. Increase fluid intake. Activity: Rest for 24 hrs then resume normal activity. HOME MEDICATIONS:      If on blood thinning products such as; Aspirin, NSAIDS, Plavix, Coumadin, Xarelto, Fish Oil, Multi-Vitamins or Herbal Supplements restart in 24 hours      Restart Metformin in 48 hours if you had procedure with dye. Restart Metformin in 24 hours if no dye used during procedure.         Education Materials Received: {yes/no:515043}  Belongings Returned:

## 2023-12-13 ENCOUNTER — ANESTHESIA (OUTPATIENT)
Dept: OPERATING ROOM | Age: 63
End: 2023-12-13
Payer: MEDICARE

## 2023-12-13 ENCOUNTER — ANESTHESIA EVENT (OUTPATIENT)
Dept: OPERATING ROOM | Age: 63
End: 2023-12-13
Payer: MEDICARE

## 2023-12-13 ENCOUNTER — HOSPITAL ENCOUNTER (OUTPATIENT)
Age: 63
Setting detail: OUTPATIENT SURGERY
Discharge: HOME | End: 2023-12-13
Attending: PAIN MEDICINE | Admitting: PAIN MEDICINE
Payer: MEDICARE

## 2023-12-13 ENCOUNTER — APPOINTMENT (OUTPATIENT)
Dept: GENERAL RADIOLOGY | Age: 63
End: 2023-12-13
Attending: PAIN MEDICINE
Payer: MEDICARE

## 2023-12-13 VITALS
SYSTOLIC BLOOD PRESSURE: 114 MMHG | HEIGHT: 66 IN | OXYGEN SATURATION: 95 % | TEMPERATURE: 97.4 F | DIASTOLIC BLOOD PRESSURE: 63 MMHG | BODY MASS INDEX: 25.39 KG/M2 | HEART RATE: 88 BPM | RESPIRATION RATE: 16 BRPM | WEIGHT: 158 LBS

## 2023-12-13 PROCEDURE — 2580000003 HC RX 258

## 2023-12-13 PROCEDURE — 3600000054 HC PAIN LEVEL 3 BASE: Performed by: PAIN MEDICINE

## 2023-12-13 PROCEDURE — A4216 STERILE WATER/SALINE, 10 ML: HCPCS

## 2023-12-13 PROCEDURE — 3700000001 HC ADD 15 MINUTES (ANESTHESIA): Performed by: PAIN MEDICINE

## 2023-12-13 PROCEDURE — 7100000010 HC PHASE II RECOVERY - FIRST 15 MIN: Performed by: PAIN MEDICINE

## 2023-12-13 PROCEDURE — 64640 INJECTION TREATMENT OF NERVE: CPT | Performed by: PAIN MEDICINE

## 2023-12-13 PROCEDURE — 2709999900 HC NON-CHARGEABLE SUPPLY: Performed by: PAIN MEDICINE

## 2023-12-13 PROCEDURE — 6360000002 HC RX W HCPCS

## 2023-12-13 PROCEDURE — 7100000011 HC PHASE II RECOVERY - ADDTL 15 MIN: Performed by: PAIN MEDICINE

## 2023-12-13 PROCEDURE — 3700000000 HC ANESTHESIA ATTENDED CARE: Performed by: PAIN MEDICINE

## 2023-12-13 PROCEDURE — 3600000055 HC PAIN LEVEL 3 ADDL 15 MIN: Performed by: PAIN MEDICINE

## 2023-12-13 PROCEDURE — 6360000002 HC RX W HCPCS: Performed by: PAIN MEDICINE

## 2023-12-13 PROCEDURE — 2500000003 HC RX 250 WO HCPCS: Performed by: PAIN MEDICINE

## 2023-12-13 RX ORDER — FENTANYL CITRATE 50 UG/ML
INJECTION, SOLUTION INTRAMUSCULAR; INTRAVENOUS PRN
Status: DISCONTINUED | OUTPATIENT
Start: 2023-12-13 | End: 2023-12-13 | Stop reason: SDUPTHER

## 2023-12-13 RX ORDER — SODIUM CHLORIDE 9 MG/ML
INJECTION INTRAVENOUS PRN
Status: DISCONTINUED | OUTPATIENT
Start: 2023-12-13 | End: 2023-12-13 | Stop reason: SDUPTHER

## 2023-12-13 RX ORDER — PROPOFOL 10 MG/ML
INJECTION, EMULSION INTRAVENOUS PRN
Status: DISCONTINUED | OUTPATIENT
Start: 2023-12-13 | End: 2023-12-13 | Stop reason: SDUPTHER

## 2023-12-13 RX ORDER — LIDOCAINE HYDROCHLORIDE 10 MG/ML
INJECTION, SOLUTION EPIDURAL; INFILTRATION; INTRACAUDAL; PERINEURAL PRN
Status: DISCONTINUED | OUTPATIENT
Start: 2023-12-13 | End: 2023-12-13 | Stop reason: ALTCHOICE

## 2023-12-13 RX ORDER — BUPIVACAINE HYDROCHLORIDE 5 MG/ML
INJECTION, SOLUTION PERINEURAL PRN
Status: DISCONTINUED | OUTPATIENT
Start: 2023-12-13 | End: 2023-12-13 | Stop reason: ALTCHOICE

## 2023-12-13 RX ORDER — SODIUM CHLORIDE 9 MG/ML
INJECTION INTRAVENOUS PRN
Status: DISCONTINUED | OUTPATIENT
Start: 2023-12-13 | End: 2023-12-13

## 2023-12-13 RX ADMIN — SODIUM CHLORIDE 10 ML: 9 INJECTION, SOLUTION INTRAMUSCULAR; INTRAVENOUS; SUBCUTANEOUS at 11:31

## 2023-12-13 RX ADMIN — PROPOFOL 50 MG: 10 INJECTION, EMULSION INTRAVENOUS at 11:31

## 2023-12-13 RX ADMIN — FENTANYL CITRATE 100 MCG: 50 INJECTION, SOLUTION INTRAMUSCULAR; INTRAVENOUS at 11:21

## 2023-12-13 RX ADMIN — PROPOFOL 80 MG: 10 INJECTION, EMULSION INTRAVENOUS at 11:29

## 2023-12-13 ASSESSMENT — PAIN - FUNCTIONAL ASSESSMENT: PAIN_FUNCTIONAL_ASSESSMENT: 0-10

## 2023-12-13 ASSESSMENT — PAIN DESCRIPTION - LOCATION: LOCATION: KNEE

## 2023-12-13 ASSESSMENT — PAIN DESCRIPTION - ORIENTATION: ORIENTATION: LEFT

## 2023-12-13 ASSESSMENT — PAIN SCALES - GENERAL: PAINLEVEL_OUTOF10: 4

## 2023-12-13 NOTE — ANESTHESIA POSTPROCEDURE EVALUATION
Department of Anesthesiology  Postprocedure Note    Patient: Hardy Crystal  MRN: [id number]  YOB: 1960  Date of evaluation: 12/13/2023    Procedure Summary       Date: 12/13/23 Room / Location: [facility] 03 / [facility]    Anesthesia Start: 1116 Anesthesia Stop: 2469    Procedure: left knee genicular nerve radiofrequency ablation (Left) Diagnosis:       Primary osteoarthritis of left knee      (Primary osteoarthritis of left knee [M17.12])    Surgeons: Trinity Mendoza MD Responsible Provider: Mallory Lopez MD    Anesthesia Type: MAC ASA Status: 2            Anesthesia Type: No value filed. Kimberlee Phase I:      Kimberlee Phase II: Kimberlee Score: 10    Anesthesia Post Evaluation    No notable events documented.

## 2023-12-13 NOTE — ANESTHESIA PRE PROCEDURE
Department of Anesthesiology  Preprocedure Note       Name:  Zeny Jiang   Age:  70 y.o.  :  1960                                          MRN:  [id number]         Date:  2023      Surgeon: Liliana Thomas):  Daylin Reddy MD    Procedure: Procedure(s):  left knee genicular nerve radiofrequency ablation    Medications prior to admission:   Prior to Admission medications    Medication Sig Start Date End Date Taking?  Authorizing Provider   rOPINIRole (REQUIP) 0.25 MG tablet Take 1 tablet by mouth nightly 2 tab nightly    Queta Grimm MD   nystatin (MYCOSTATIN) 598525 UNIT/ML suspension Take 5 mLs by mouth 3 times daily For 7 days    Queta Grimm MD   famotidine (PEPCID) 40 MG tablet Take 1 tablet by mouth daily    Queta Grimm MD   pantoprazole sodium (PROTONIX) 40 MG PACK packet Take 1 packet by mouth every morning (before breakfast)    Queta Grimm MD   isosorbide mononitrate (IMDUR) 30 MG extended release tablet Take 1 tablet by mouth daily    Queta Grimm MD   baclofen (LIORESAL) 10 MG tablet Take 1 tablet by mouth 2 times daily    Queta Grimm MD   aspirin 81 MG tablet Take 1 tablet by mouth daily    Queta Grimm MD   dilTIAZem (CARDIZEM) 30 MG tablet take 1 tablet by mouth twice a day 20   Queta Grimm MD   lisinopril (PRINIVIL;ZESTRIL) 5 MG tablet Take 1 tablet by mouth daily 20   Queta Grimm MD   calcium carbonate 1500 (600 Ca) MG TABS tablet 1 tablet 2 times daily 20   Queta Grimm MD   albuterol sulfate  (90 Base) MCG/ACT inhaler Indications: Pt states she takes once in a while 20   Queta Grimm MD   amitriptyline (ELAVIL) 50 MG tablet Take 0.5 tablets by mouth daily 16   Queta Grimm MD   VITAMIN D, CHOLECALCIFEROL, PO Take 1 tablet by mouth daily    Queta Grimm MD       Current medications:    No current facility-administered medications

## 2023-12-13 NOTE — H&P
H&P      Patient states main complaint  is pain that has been increasing over the past 2-3 weeks in left knee as she reports her relief from her left knee genicular nerve RFA started to wear off. Pain is in left nee throughout bilaterally- sharp and throbbing pain worse with walking. Feels that her knee feels catching with walking. Left leg has been mor stiff as well. Patient reports that she is starting to feel pain in low back aching and more stiffness. Still feels relief from bilateral L-facet RFA but feels that it is waning. Has aching pain in posterior neck but again still relief from C-facet RFA. Pain increases with bending, lifting, twisting , walking, standing, getting up and down, and housework or working at job, weather changes      Continues tylenol prn, Baclofen from pcp. Off NSAID d/t heart disease      Prior Injections:  left knee genicular nerve block from 3/7/2023     left genicular knee RFA from 5/22/2023 95% relief from over 6 months            Medications reviewed. Patient denies side effects with medications. Patient states she is taking medications as prescribed. Shedenies receiving pain medications from other sources. She denies any ER visits since last visit. Pain scale with out pain medications or at its worst is 8-10/10 left knee   The patients pain is moderate to severe that causes functional deficit measured on a pain and disability scale. The patients Oswestry Disability Index is currently 52% 25/50. The patient reports worsening quality of life. The patientis allergic to nsaids, pcn [penicillins], and medrol [methylprednisolone]. Subjective:      Review of Systems   Constitutional: Negative. Respiratory: Negative. Tobacco use    Cardiovascular: Negative. Musculoskeletal:  Positive for arthralgias, back pain, myalgias, neck pain and neck stiffness. Negative for gait problem and joint swelling.         No assist devices    Neurological:

## 2024-01-03 ENCOUNTER — TELEPHONE (OUTPATIENT)
Dept: PHYSICAL MEDICINE AND REHAB | Age: 64
End: 2024-01-03

## 2024-01-03 ENCOUNTER — OFFICE VISIT (OUTPATIENT)
Dept: PHYSICAL MEDICINE AND REHAB | Age: 64
End: 2024-01-03
Payer: MEDICARE

## 2024-01-03 VITALS
HEIGHT: 66 IN | DIASTOLIC BLOOD PRESSURE: 62 MMHG | SYSTOLIC BLOOD PRESSURE: 112 MMHG | WEIGHT: 158.07 LBS | BODY MASS INDEX: 25.4 KG/M2

## 2024-01-03 DIAGNOSIS — M47.812 SPONDYLOSIS OF CERVICAL REGION WITHOUT MYELOPATHY OR RADICULOPATHY: ICD-10-CM

## 2024-01-03 DIAGNOSIS — G89.29 CHRONIC BILATERAL LOW BACK PAIN WITHOUT SCIATICA: ICD-10-CM

## 2024-01-03 DIAGNOSIS — M17.12 PRIMARY OSTEOARTHRITIS OF LEFT KNEE: ICD-10-CM

## 2024-01-03 DIAGNOSIS — M47.816 SPONDYLOSIS OF LUMBAR REGION WITHOUT MYELOPATHY OR RADICULOPATHY: Primary | ICD-10-CM

## 2024-01-03 DIAGNOSIS — M47.816 LUMBAR FACET ARTHROPATHY: ICD-10-CM

## 2024-01-03 DIAGNOSIS — M25.562 CHRONIC PAIN OF LEFT KNEE: ICD-10-CM

## 2024-01-03 DIAGNOSIS — M54.50 CHRONIC BILATERAL LOW BACK PAIN WITHOUT SCIATICA: ICD-10-CM

## 2024-01-03 DIAGNOSIS — G89.29 CHRONIC PAIN OF LEFT KNEE: ICD-10-CM

## 2024-01-03 DIAGNOSIS — M79.18 CHRONIC MYOFASCIAL PAIN: ICD-10-CM

## 2024-01-03 DIAGNOSIS — G89.29 CHRONIC MYOFASCIAL PAIN: ICD-10-CM

## 2024-01-03 DIAGNOSIS — M54.2 NECK PAIN: ICD-10-CM

## 2024-01-03 PROCEDURE — G8484 FLU IMMUNIZE NO ADMIN: HCPCS | Performed by: NURSE PRACTITIONER

## 2024-01-03 PROCEDURE — G8419 CALC BMI OUT NRM PARAM NOF/U: HCPCS | Performed by: NURSE PRACTITIONER

## 2024-01-03 PROCEDURE — G8428 CUR MEDS NOT DOCUMENT: HCPCS | Performed by: NURSE PRACTITIONER

## 2024-01-03 PROCEDURE — 1123F ACP DISCUSS/DSCN MKR DOCD: CPT | Performed by: NURSE PRACTITIONER

## 2024-01-03 PROCEDURE — 99214 OFFICE O/P EST MOD 30 MIN: CPT | Performed by: NURSE PRACTITIONER

## 2024-01-03 PROCEDURE — G8400 PT W/DXA NO RESULTS DOC: HCPCS | Performed by: NURSE PRACTITIONER

## 2024-01-03 PROCEDURE — 4004F PT TOBACCO SCREEN RCVD TLK: CPT | Performed by: NURSE PRACTITIONER

## 2024-01-03 PROCEDURE — 1090F PRES/ABSN URINE INCON ASSESS: CPT | Performed by: NURSE PRACTITIONER

## 2024-01-03 PROCEDURE — 3017F COLORECTAL CA SCREEN DOC REV: CPT | Performed by: NURSE PRACTITIONER

## 2024-01-03 ASSESSMENT — ENCOUNTER SYMPTOMS
RESPIRATORY NEGATIVE: 1
BACK PAIN: 1

## 2024-01-03 NOTE — PROGRESS NOTES
Tenderness present.   Skin:     General: Skin is warm.      Coloration: Skin is not pale.      Findings: No erythema or rash.   Neurological:      Mental Status: She is alert and oriented to person, place, and time. She is not disoriented.      Cranial Nerves: No cranial nerve deficit.      Sensory: No sensory deficit.      Motor: Weakness present. No atrophy or abnormal muscle tone.      Coordination: Coordination normal.      Gait: Gait normal.      Deep Tendon Reflexes: Reflexes are normal and symmetric. Babinski sign absent on the right side. Babinski sign absent on the left side.   Psychiatric:         Attention and Perception: Attention normal. She is attentive.         Mood and Affect: Mood normal. Mood is not anxious or depressed. Affect is not labile, blunt, angry or inappropriate.         Speech: Speech normal. She is communicative. Speech is not rapid and pressured, delayed, slurred or tangential.         Behavior: Behavior normal. Behavior is not agitated, slowed, aggressive, withdrawn, hyperactive or combative. Behavior is cooperative.         Thought Content: Thought content normal. Thought content is not paranoid or delusional. Thought content does not include homicidal or suicidal ideation. Thought content does not include homicidal or suicidal plan.         Cognition and Memory: Cognition normal. Memory is not impaired. She does not exhibit impaired recent memory or impaired remote memory.         Judgment: Judgment normal. Judgment is not impulsive or inappropriate.       BETHANY  Patricks test  negative   Yeoman's  or Gaenslen's negative         Assessment:     1. Spondylosis of lumbar region without myelopathy or radiculopathy    2. Lumbar facet arthropathy    3. Chronic bilateral low back pain without sciatica    4. Spondylosis of cervical region without myelopathy or radiculopathy    5. Neck pain    6. Chronic myofascial pain    7. Chronic pain of left knee    8. Primary osteoarthritis of left

## 2024-01-03 NOTE — TELEPHONE ENCOUNTER
Pt denies taking any blood thinners except ASA 81 mg. (no cardiac events in the last 6 months) prior to scheduling procedure.

## 2024-01-17 NOTE — DISCHARGE INSTRUCTIONS
ANESTHESIA INSTRUCTIONS FOLLOWING SURGERY        Since you may experience some intermittent light-headedness for the next several hours, we suggest you plan on bed rest or quiet relaxation this evening. You must have a friend or relative stay with you tonight.    Because of the sedation you have received, it is recommended that you do not drive a motor vehicle, operate any kind of machinery, or sign any contractual agreement for 24 hours following the procedure.    You should not take alcoholic beverages tonight and only take sleeping medication that has been specifically prescribed for you by your physician.  Call office [phone] if you have:  Temperature greater than 100.4  Persistent nausea and vomiting  Severe uncontrolled pain  Redness, tenderness, or signs of infection (pain, swelling, redness, odor or green/yellow discharge around the site)  Difficulty breathing, headache or visual disturbances  Hives  Persistent dizziness or light-headedness  Extreme fatigue  Any other questions or concerns you may have after discharge    In an emergency, call 911 or go to an Emergency Department at a nearby hospital    It is important to bring a complete, current list of your medications to any medical appointments or hospitalizations.    REMINDER:   Carry a list of your medications and allergies with you at all times  Call your pharmacy at least 1 week in advance to refill prescriptions    Diet: Resume your usual diet. Good nutrition promotes healing. Increase fluid intake.     Activity: Rest for 24 hrs then resume normal activity.    HOME MEDICATIONS:      If on blood thinning products such as; Aspirin, NSAIDS, Plavix, Coumadin, Xarelto, Fish Oil, Multi-Vitamins or Herbal Supplements restart in 24 hours      Restart Metformin in 48 hours if you had procedure with dye.      Restart Metformin in 24 hours if no dye used during procedure.        Education Materials Received: {yes/no:212994}  Belongings Returned:

## 2024-01-18 ENCOUNTER — TELEPHONE (OUTPATIENT)
Dept: PHYSICAL MEDICINE AND REHAB | Age: 64
End: 2024-01-18

## 2024-01-18 NOTE — TELEPHONE ENCOUNTER
Called pt and let her know the procedure is still pending. I told her I can leave her on until 1/24 and if not approved by then we will have to move her out.

## 2024-01-25 ENCOUNTER — TELEPHONE (OUTPATIENT)
Dept: PHYSICAL MEDICINE AND REHAB | Age: 64
End: 2024-01-25

## 2024-01-30 ENCOUNTER — APPOINTMENT (OUTPATIENT)
Dept: GENERAL RADIOLOGY | Age: 64
End: 2024-01-30
Attending: PAIN MEDICINE
Payer: MEDICARE

## 2024-01-30 ENCOUNTER — HOSPITAL ENCOUNTER (OUTPATIENT)
Age: 64
Setting detail: OUTPATIENT SURGERY
Discharge: HOME | End: 2024-01-30
Attending: PAIN MEDICINE | Admitting: PAIN MEDICINE
Payer: MEDICARE

## 2024-01-30 VITALS
TEMPERATURE: 97.8 F | BODY MASS INDEX: 25.55 KG/M2 | OXYGEN SATURATION: 97 % | HEART RATE: 69 BPM | HEIGHT: 66 IN | RESPIRATION RATE: 16 BRPM | DIASTOLIC BLOOD PRESSURE: 79 MMHG | WEIGHT: 159 LBS | SYSTOLIC BLOOD PRESSURE: 142 MMHG

## 2024-01-30 PROCEDURE — 2500000003 HC RX 250 WO HCPCS: Performed by: PAIN MEDICINE

## 2024-01-30 PROCEDURE — 6360000002 HC RX W HCPCS: Performed by: PAIN MEDICINE

## 2024-01-30 PROCEDURE — 99152 MOD SED SAME PHYS/QHP 5/>YRS: CPT | Performed by: PAIN MEDICINE

## 2024-01-30 PROCEDURE — 64636 DESTROY L/S FACET JNT ADDL: CPT | Performed by: PAIN MEDICINE

## 2024-01-30 PROCEDURE — 7100000010 HC PHASE II RECOVERY - FIRST 15 MIN: Performed by: PAIN MEDICINE

## 2024-01-30 PROCEDURE — 3600000057 HC PAIN LEVEL 4 ADDL 15 MIN: Performed by: PAIN MEDICINE

## 2024-01-30 PROCEDURE — 3600000056 HC PAIN LEVEL 4 BASE: Performed by: PAIN MEDICINE

## 2024-01-30 PROCEDURE — 2709999900 HC NON-CHARGEABLE SUPPLY: Performed by: PAIN MEDICINE

## 2024-01-30 PROCEDURE — 64635 DESTROY LUMB/SAC FACET JNT: CPT | Performed by: PAIN MEDICINE

## 2024-01-30 PROCEDURE — 7100000011 HC PHASE II RECOVERY - ADDTL 15 MIN: Performed by: PAIN MEDICINE

## 2024-01-30 PROCEDURE — 99153 MOD SED SAME PHYS/QHP EA: CPT | Performed by: PAIN MEDICINE

## 2024-01-30 RX ORDER — FENTANYL CITRATE 50 UG/ML
INJECTION, SOLUTION INTRAMUSCULAR; INTRAVENOUS PRN
Status: DISCONTINUED | OUTPATIENT
Start: 2024-01-30 | End: 2024-01-30 | Stop reason: ALTCHOICE

## 2024-01-30 RX ORDER — ACETAMINOPHEN 500 MG
500 TABLET ORAL EVERY 6 HOURS PRN
COMMUNITY

## 2024-01-30 RX ORDER — MIDAZOLAM HYDROCHLORIDE 1 MG/ML
INJECTION INTRAMUSCULAR; INTRAVENOUS PRN
Status: DISCONTINUED | OUTPATIENT
Start: 2024-01-30 | End: 2024-01-30 | Stop reason: ALTCHOICE

## 2024-01-30 RX ORDER — LIDOCAINE HYDROCHLORIDE 20 MG/ML
INJECTION, SOLUTION INFILTRATION; PERINEURAL PRN
Status: DISCONTINUED | OUTPATIENT
Start: 2024-01-30 | End: 2024-01-30 | Stop reason: ALTCHOICE

## 2024-01-30 RX ORDER — BUPIVACAINE HYDROCHLORIDE 5 MG/ML
INJECTION, SOLUTION PERINEURAL PRN
Status: DISCONTINUED | OUTPATIENT
Start: 2024-01-30 | End: 2024-01-30 | Stop reason: ALTCHOICE

## 2024-01-30 ASSESSMENT — PAIN - FUNCTIONAL ASSESSMENT
PAIN_FUNCTIONAL_ASSESSMENT: 0-10
PAIN_FUNCTIONAL_ASSESSMENT: 0-10

## 2024-01-30 ASSESSMENT — PAIN DESCRIPTION - DESCRIPTORS: DESCRIPTORS: ACHING

## 2024-01-30 NOTE — POST SEDATION
IV sedation was used during the procedure:  - Moderate intravenous conscious sedation was supervised by Dr. Beltrán  - The patient was independently monitored by a Registered Nurse assigned to the procedure room  - Monitoring included automated blood pressure, continuous EKG, and continuous pulse oximetry  - The detailed conscious record is permanently stored in the Hospital Information System  - The following is the conscious sedation record:  Start Time: 1319  End Time : 1329  Duration: 15 minutes   Medications Administered: 1 mg Versed, 100 mcg Fentanyl  Complications: none  EBL-0

## 2024-01-30 NOTE — H&P
H&P      Patient main compliant  is pain in low back as she feels relief from previosu L-facet RFA has worn off the past 1-2 months. Pain starts in center of lower back and radiates across axial.p constant aching, sharp grinding pain throbbing pain and pressure.   Neck pain is slowly increasing as well but not as bad as low back pain   Denies any radicular pain   Continues tylenol prn, Baclofen from pcp.   Pain increases with bending, lifting, twisting , walking, standing, sitting, getting up and down, and housework or working at job, weather changes         Prior Injections:  Bilateral L-facet RFA @ L4-5 and L5-S1 from October 4th 2022 95% relief of low back pain for about 1 full year      C-facet RFA @ C5-6, and C6-7 completed 12/20/2023. 85% relief         left knee genicular nerve block from 3/7/2023     left genicular knee RFA from 5/22/2023 90% relief from over 6 months      left knee genicular RFA completed by Dr. Beltrán on 12/13/2023 70% to 80% relief         Radiology:  Lumbar MRI:     FINDINGS:  There is minimal, grade 1, anterolisthesis of L3 relative to L4 on the basis of degenerative change. There is otherwise anatomic vertebral body height and alignment. There is hyperintense T1 and T2 signal at the opposing endplates of L2-3 with associated   small osteophytes as evidence for Modic 2 degenerative change. There are patchy areas of hyperintense T1 and T2 signal throughout the lumbar vertebral column as evidence for hemangiomas and/or focal fatty infiltration. Marrow signal is otherwise within   normal limits. The conus terminates in a normal fashion at the L1 level. The cauda equina is normal in appearance without nerve root thickening or clumping identified. Paraspinal soft tissues are unremarkable.  At T12-L1 there is a minimal disc bulge without significant spinal canal or neuroforaminal stenosis.  At L1-2 there is no significant spinal canal or neuroforaminal stenosis.  At L2-3 there is a minimal

## 2024-01-30 NOTE — OP NOTE
process of the facet joint was taken as a reference point.  For the L4 median branch the junction of the transverse process of L5 with the superolateral possible facet joint was taken as a reference point and for S1 median branch the most lateral and superior aspect of S1 foramina was taken as a reference point,.    Patient's vital signs and neurological status remained stable throughout the procedure and post procedural period.  The patient tolerated the procedure well and was discharged home in stable condition        Electronically signed by Mark White MD on 1/30/24 at 1:16 PM EST

## 2024-01-30 NOTE — PROGRESS NOTES
1334 - Patient arrived to Phase II via bed, report from Isis GARLAND. Patient awake and alert without complaints. VSS. Site WNL. Patient positioned for comfort, drink and snack provided.  1338 - IV removed, patient dressed.  1406- Patient discharged ambulatory to private vehicle with friend.

## 2024-02-14 ENCOUNTER — TELEPHONE (OUTPATIENT)
Dept: PHYSICAL MEDICINE AND REHAB | Age: 64
End: 2024-02-14

## 2024-02-14 ENCOUNTER — OFFICE VISIT (OUTPATIENT)
Dept: PHYSICAL MEDICINE AND REHAB | Age: 64
End: 2024-02-14
Payer: MEDICARE

## 2024-02-14 VITALS
SYSTOLIC BLOOD PRESSURE: 118 MMHG | BODY MASS INDEX: 25.55 KG/M2 | HEIGHT: 66 IN | WEIGHT: 158.95 LBS | DIASTOLIC BLOOD PRESSURE: 62 MMHG

## 2024-02-14 DIAGNOSIS — G89.29 CHRONIC PAIN OF LEFT KNEE: ICD-10-CM

## 2024-02-14 DIAGNOSIS — M25.562 CHRONIC PAIN OF LEFT KNEE: ICD-10-CM

## 2024-02-14 DIAGNOSIS — M47.812 SPONDYLOSIS OF CERVICAL REGION WITHOUT MYELOPATHY OR RADICULOPATHY: Primary | ICD-10-CM

## 2024-02-14 DIAGNOSIS — G89.29 CHRONIC BILATERAL LOW BACK PAIN WITHOUT SCIATICA: ICD-10-CM

## 2024-02-14 DIAGNOSIS — M47.816 LUMBAR FACET ARTHROPATHY: ICD-10-CM

## 2024-02-14 DIAGNOSIS — M54.2 NECK PAIN: ICD-10-CM

## 2024-02-14 DIAGNOSIS — M79.18 CHRONIC MYOFASCIAL PAIN: ICD-10-CM

## 2024-02-14 DIAGNOSIS — G89.29 CHRONIC MYOFASCIAL PAIN: ICD-10-CM

## 2024-02-14 DIAGNOSIS — M17.12 PRIMARY OSTEOARTHRITIS OF LEFT KNEE: ICD-10-CM

## 2024-02-14 DIAGNOSIS — M47.816 SPONDYLOSIS OF LUMBAR REGION WITHOUT MYELOPATHY OR RADICULOPATHY: ICD-10-CM

## 2024-02-14 DIAGNOSIS — M54.50 CHRONIC BILATERAL LOW BACK PAIN WITHOUT SCIATICA: ICD-10-CM

## 2024-02-14 DIAGNOSIS — G89.4 CHRONIC PAIN SYNDROME: ICD-10-CM

## 2024-02-14 PROCEDURE — G8419 CALC BMI OUT NRM PARAM NOF/U: HCPCS | Performed by: NURSE PRACTITIONER

## 2024-02-14 PROCEDURE — 99214 OFFICE O/P EST MOD 30 MIN: CPT | Performed by: NURSE PRACTITIONER

## 2024-02-14 PROCEDURE — 4004F PT TOBACCO SCREEN RCVD TLK: CPT | Performed by: NURSE PRACTITIONER

## 2024-02-14 PROCEDURE — 3017F COLORECTAL CA SCREEN DOC REV: CPT | Performed by: NURSE PRACTITIONER

## 2024-02-14 PROCEDURE — 1090F PRES/ABSN URINE INCON ASSESS: CPT | Performed by: NURSE PRACTITIONER

## 2024-02-14 PROCEDURE — G8427 DOCREV CUR MEDS BY ELIG CLIN: HCPCS | Performed by: NURSE PRACTITIONER

## 2024-02-14 PROCEDURE — G8484 FLU IMMUNIZE NO ADMIN: HCPCS | Performed by: NURSE PRACTITIONER

## 2024-02-14 PROCEDURE — G8400 PT W/DXA NO RESULTS DOC: HCPCS | Performed by: NURSE PRACTITIONER

## 2024-02-14 PROCEDURE — 1123F ACP DISCUSS/DSCN MKR DOCD: CPT | Performed by: NURSE PRACTITIONER

## 2024-02-14 NOTE — PROGRESS NOTES
[facility] [place] [facility]  [facility]  [address]  [facility] [address]  Dept: [phone]  Dept Fax: [phone]  Loc: [phone]    Visit Date: 2/14/2024    Functionality Assessment/Goals Worksheet     On a scale of 0 (Does not Interfere) to 10 (Completely Interferes)     1.  Which number describes how during the past week pain has interfered with       the following:  A.  General Activity:  3  B.  Mood: 0  C.  Walking Ability:  3  D.  Normal Work (Includes both work outside the home and housework):  3  E.  Relations with Other People:   0  F.  Sleep:   3  G.  Enjoyment of Life:   0    2.  Patient Prefers to Take their Pain Medications:     []  On a regular basis   []  Only when necessary    []  Does not take pain medications    3.  What are the Patient's Goals/Expectations for Visiting Pain Management?     []  Learn about my pain    [x]  Receive Medication   []  Physical Therapy     []  Treat Depression   [x]  Receive Injections    []  Treat Sleep   []  Deal with Anxiety and Stress   []  Treat Opoid Dependence/Addiction   []  Other:        HPI:   Gladys Pepper is a 63 y.o. female is here today for    Chief Complaint: Neck pain     HPI   FU from bilateral L-facet RFA @ L4-5 and L5-S1 completed by Dr. Beltrán on 1/30/2024. Patient states she is doing very well with low back pain and feels that she is receiving 90% relief of low back pain. Denies any low back pain at this time. Is more intermittent and mild aching pain when she gets it. Able to tolerate more activity and walking more ,doing more chores around the house with less pain. Pain 0/10 in low back at this time \"it feels normal.     States knee pain remains tolerable as she continues good relief form left knee genicular neve block.     Main complaint today is posterior neck pain constant aching pain with stiffness. Feels that this increased over the past month as she feels

## 2024-03-13 ENCOUNTER — APPOINTMENT (OUTPATIENT)
Dept: GENERAL RADIOLOGY | Age: 64
End: 2024-03-13
Attending: PAIN MEDICINE
Payer: MEDICARE

## 2024-03-13 ENCOUNTER — HOSPITAL ENCOUNTER (OUTPATIENT)
Age: 64
Setting detail: OUTPATIENT SURGERY
Discharge: HOME | End: 2024-03-13
Attending: PAIN MEDICINE | Admitting: PAIN MEDICINE
Payer: MEDICARE

## 2024-03-13 VITALS
RESPIRATION RATE: 16 BRPM | TEMPERATURE: 97.9 F | BODY MASS INDEX: 24.91 KG/M2 | HEART RATE: 72 BPM | HEIGHT: 66 IN | SYSTOLIC BLOOD PRESSURE: 131 MMHG | DIASTOLIC BLOOD PRESSURE: 80 MMHG | OXYGEN SATURATION: 95 % | WEIGHT: 155 LBS

## 2024-03-13 PROCEDURE — 6360000002 HC RX W HCPCS: Performed by: PAIN MEDICINE

## 2024-03-13 PROCEDURE — 2709999900 HC NON-CHARGEABLE SUPPLY: Performed by: PAIN MEDICINE

## 2024-03-13 PROCEDURE — 99152 MOD SED SAME PHYS/QHP 5/>YRS: CPT | Performed by: PAIN MEDICINE

## 2024-03-13 PROCEDURE — 7100000010 HC PHASE II RECOVERY - FIRST 15 MIN: Performed by: PAIN MEDICINE

## 2024-03-13 PROCEDURE — 3600000054 HC PAIN LEVEL 3 BASE: Performed by: PAIN MEDICINE

## 2024-03-13 PROCEDURE — 3600000055 HC PAIN LEVEL 3 ADDL 15 MIN: Performed by: PAIN MEDICINE

## 2024-03-13 PROCEDURE — 64634 DESTROY C/TH FACET JNT ADDL: CPT | Performed by: PAIN MEDICINE

## 2024-03-13 PROCEDURE — 64633 DESTROY CERV/THOR FACET JNT: CPT | Performed by: PAIN MEDICINE

## 2024-03-13 PROCEDURE — 7100000011 HC PHASE II RECOVERY - ADDTL 15 MIN: Performed by: PAIN MEDICINE

## 2024-03-13 PROCEDURE — 2500000003 HC RX 250 WO HCPCS: Performed by: PAIN MEDICINE

## 2024-03-13 RX ORDER — BUPIVACAINE HYDROCHLORIDE 5 MG/ML
INJECTION, SOLUTION PERINEURAL PRN
Status: DISCONTINUED | OUTPATIENT
Start: 2024-03-13 | End: 2024-03-13 | Stop reason: ALTCHOICE

## 2024-03-13 RX ORDER — HYDROXYZINE HYDROCHLORIDE 25 MG/1
25 TABLET, FILM COATED ORAL 3 TIMES DAILY PRN
COMMUNITY

## 2024-03-13 RX ORDER — ESCITALOPRAM OXALATE 5 MG/1
5 TABLET ORAL DAILY
COMMUNITY

## 2024-03-13 RX ORDER — MIDAZOLAM HYDROCHLORIDE 1 MG/ML
INJECTION INTRAMUSCULAR; INTRAVENOUS PRN
Status: DISCONTINUED | OUTPATIENT
Start: 2024-03-13 | End: 2024-03-13 | Stop reason: ALTCHOICE

## 2024-03-13 RX ORDER — LIDOCAINE HYDROCHLORIDE 20 MG/ML
INJECTION, SOLUTION INFILTRATION; PERINEURAL PRN
Status: DISCONTINUED | OUTPATIENT
Start: 2024-03-13 | End: 2024-03-13 | Stop reason: ALTCHOICE

## 2024-03-13 RX ORDER — FENTANYL CITRATE 50 UG/ML
INJECTION, SOLUTION INTRAMUSCULAR; INTRAVENOUS PRN
Status: DISCONTINUED | OUTPATIENT
Start: 2024-03-13 | End: 2024-03-13 | Stop reason: ALTCHOICE

## 2024-03-13 ASSESSMENT — PAIN DESCRIPTION - DESCRIPTORS: DESCRIPTORS: ACHING

## 2024-03-13 ASSESSMENT — PAIN - FUNCTIONAL ASSESSMENT
PAIN_FUNCTIONAL_ASSESSMENT: 0-10
PAIN_FUNCTIONAL_ASSESSMENT: NONE - DENIES PAIN

## 2024-03-13 NOTE — PROGRESS NOTES
1051-Patient to Phase II via cart. Report received from Alisa GARLAND. Patient drowsy but responsive.Vitals obtained and stable. Respirations even and unlabored on room air. Patient denies pain, nausea, numbness and tingling. Patient able to move all extremities. No drainage noted at injection sites. Patient instructed to stay in bed. Instructed on call light use.  1055-Patient provided with snack and drink. Denies needs. Call light in reach. Ride notified of pickup at 1110. States he will arrive then.  1108-IV removed with no complications. Patient getting dressed at bedside.  1110-Patient meets discharge criteria.  Discharged in stable condition with responsible . All belongings given to patient. Patient ambulated to car with assistance from RN. Patient tolerated well.

## 2024-03-13 NOTE — PRE SEDATION
tablet 2 times daily 1/25/20   Queta Grimm MD   albuterol sulfate  (90 Base) MCG/ACT inhaler Indications: Pt states she takes once in a while 1/21/20   Queta Grimm MD   amitriptyline (ELAVIL) 50 MG tablet Take 0.5 tablets by mouth daily 8/29/16   Queta Grimm MD   VITAMIN D, CHOLECALCIFEROL, PO Take 1 tablet by mouth daily    Queta Grimm MD     PHYSICAL:   There were no vitals filed for this visit.  Mental Status: alert and oriented   Heart:  Regular rate and rhythm    Lungs:  Clear to ausculation   Abdomen: soft, non tender   PLANNED PROCEDURE   Bilateral C-facet RFA @ C5-6, and C6-7 under fluoroscopy    SEDATION  Planned agent:fentanyl and versed  ASA Classification: 3  Class 1: A normal healthy patient  Class 2: Pt with mild to moderate systemic disease  Class 3: Severe systemic disease or disturbance  Class 4: Severe systemic disorders that are already life threatening.  Class 5: Moribund pt with little chances of survival, for more than 24 hours.  Mallampati I Airway Classification: 2    1. Pre-procedure diagnostic studies complete and results available.   Comment:    2. Previous sedation/anesthesia experiences assessed.   Comment:    3. The patient is an appropriate candidate to undergo the planned procedure sedation and anesthesia. (Refer to nursing sedation/analgesia documentation record)  4. Formulation and discussion of sedation/procedure plan, risks, and expectations with patient and/or responsible adult completed.  5. Patient examined immediately prior to the procedure. (Refer to nursing sedation/analgesia documentation record)    Mark White MD  Electronically signed 3/13/2024 at 10:10 AM

## 2024-03-13 NOTE — POST SEDATION
IV sedation was used during the procedure:  - Moderate intravenous conscious sedation was supervised by Dr. Beltrán  - The patient was independently monitored by a Registered Nurse assigned to the procedure room  - Monitoring included automated blood pressure, continuous EKG, and continuous pulse oximetry  - The detailed conscious record is permanently stored in the Hospital Information System  - The following is the conscious sedation record:  Start Time: 1032  End Time : 1050  Duration: 15 minutes   Medications Administered: 2 mg Versed, 100 mcg Fentanyl  Complications: none  EBL-0

## 2024-03-13 NOTE — OP NOTE
Pre-Procedure Note    Patient Name: Gladys Pepper   YOB: 1960  Medical Record Number: [id number]  Date: 3/13/24     Indication:  Neck pain    Consent: On file.    Vital Signs:   Vitals:    03/13/24 1046   BP:    Pulse: 84   Resp: 13   Temp:    SpO2: 97%       Past Medical History:   has a past medical history of Anxiety, Chronic back pain, COPD (chronic obstructive pulmonary disease) ([facility]), Depression, Disc disorder, Emphysema of lung ([facility]), Fibromyalgia, Hyperlipidemia, Hypertension, Osteoarthritis, Osteoporosis, and Sleep apnea.    Past Surgical History:   has a past surgical history that includes Tonsillectomy (1972); Knee arthroscopy (Bilateral, 2019); Facet joint injection (Bilateral, 5/14/2020); Facet joint injection (Bilateral, 6/17/2020); Pain management procedure (Bilateral, 7/22/2020); Pain management procedure (Right, 10/7/2020); Pain management procedure (Left, 11/4/2020); Facet joint injection (Bilateral, 1/20/2021); Pain management procedure (Left, 6/8/2021); Pain management procedure (Right, 7/27/2021); Facet joint injection (Bilateral, 12/21/2021); Facet joint injection (Bilateral, 2/11/2022); Pain management procedure (Bilateral, 10/4/2022); Pain management procedure (Bilateral, 12/20/2022); Pain management procedure (Left, 3/7/2023); Pain management procedure (Left, 5/22/2023); Pain management procedure (Left, 12/13/2023); and Pain management procedure (Bilateral, 1/30/2024).    Pre-Sedation Documentation and Exam:   Vital signs have been reviewed (see flow sheet for vitals).     Sedation/ Anesthesia :  IV sedation    Patient is an appropriate candidate for plan of sedation: yes    Preoperative Diagnosis:  C-spondylosis     Post-Op Dx: as above        Procedure Performed:  Radiofrequency abalation of median branchs at the levels of C5-6 and C6-7 bilateral under fluoroscopic guidance      Indication for the Procedure:  The patient had history of chronic neck pain that is not

## 2024-03-13 NOTE — H&P
treatment/procedure results.   Discussed with patient about risks with procedure including infection, reaction to medication, increased pain, or bleeding.  Procedure notes reviewed   Patient is receiving over 905 relief of low back pain from bilateral L-facet RFA and not having any low back pain.   Plan bilateral C-facet RFA @ C5-6, and C6-7 under fluoroscopy for longer term pain relief. Procedure and risks discussed in detail with patient. Received over 85% relief from previous RFA ast same level for over a year with relief of headaches as well.   Needs to hold baby aspirin prior to procedure   Continue tylenol prn and Baclofen from pcp   Not needing any other pain medications               Return for Bilateral C-facet RFA @ C5-6, and C6-7 under fluoroscopy. , follow up after procedure.

## 2024-04-03 ENCOUNTER — OFFICE VISIT (OUTPATIENT)
Dept: PHYSICAL MEDICINE AND REHAB | Age: 64
End: 2024-04-03
Payer: MEDICARE

## 2024-04-03 VITALS
BODY MASS INDEX: 24.91 KG/M2 | WEIGHT: 154.98 LBS | DIASTOLIC BLOOD PRESSURE: 76 MMHG | HEIGHT: 66 IN | SYSTOLIC BLOOD PRESSURE: 124 MMHG

## 2024-04-03 DIAGNOSIS — M25.562 CHRONIC PAIN OF LEFT KNEE: ICD-10-CM

## 2024-04-03 DIAGNOSIS — G89.29 CHRONIC MYOFASCIAL PAIN: Primary | ICD-10-CM

## 2024-04-03 DIAGNOSIS — G89.4 CHRONIC PAIN SYNDROME: ICD-10-CM

## 2024-04-03 DIAGNOSIS — M79.18 CHRONIC MYOFASCIAL PAIN: Primary | ICD-10-CM

## 2024-04-03 DIAGNOSIS — M47.816 SPONDYLOSIS OF LUMBAR REGION WITHOUT MYELOPATHY OR RADICULOPATHY: ICD-10-CM

## 2024-04-03 DIAGNOSIS — M17.12 PRIMARY OSTEOARTHRITIS OF LEFT KNEE: ICD-10-CM

## 2024-04-03 DIAGNOSIS — G89.29 CHRONIC PAIN OF LEFT KNEE: ICD-10-CM

## 2024-04-03 DIAGNOSIS — G89.29 CHRONIC BILATERAL LOW BACK PAIN WITHOUT SCIATICA: ICD-10-CM

## 2024-04-03 DIAGNOSIS — M54.50 CHRONIC BILATERAL LOW BACK PAIN WITHOUT SCIATICA: ICD-10-CM

## 2024-04-03 DIAGNOSIS — M47.816 LUMBAR FACET ARTHROPATHY: ICD-10-CM

## 2024-04-03 DIAGNOSIS — M47.812 SPONDYLOSIS OF CERVICAL REGION WITHOUT MYELOPATHY OR RADICULOPATHY: ICD-10-CM

## 2024-04-03 DIAGNOSIS — M54.2 NECK PAIN: ICD-10-CM

## 2024-04-03 PROCEDURE — G8419 CALC BMI OUT NRM PARAM NOF/U: HCPCS | Performed by: NURSE PRACTITIONER

## 2024-04-03 PROCEDURE — 1090F PRES/ABSN URINE INCON ASSESS: CPT | Performed by: NURSE PRACTITIONER

## 2024-04-03 PROCEDURE — 99214 OFFICE O/P EST MOD 30 MIN: CPT | Performed by: NURSE PRACTITIONER

## 2024-04-03 PROCEDURE — 1123F ACP DISCUSS/DSCN MKR DOCD: CPT | Performed by: NURSE PRACTITIONER

## 2024-04-03 PROCEDURE — G8428 CUR MEDS NOT DOCUMENT: HCPCS | Performed by: NURSE PRACTITIONER

## 2024-04-03 PROCEDURE — 4004F PT TOBACCO SCREEN RCVD TLK: CPT | Performed by: NURSE PRACTITIONER

## 2024-04-03 PROCEDURE — 3017F COLORECTAL CA SCREEN DOC REV: CPT | Performed by: NURSE PRACTITIONER

## 2024-04-03 PROCEDURE — G8400 PT W/DXA NO RESULTS DOC: HCPCS | Performed by: NURSE PRACTITIONER

## 2024-04-03 ASSESSMENT — ENCOUNTER SYMPTOMS
BACK PAIN: 1
RESPIRATORY NEGATIVE: 1

## 2024-04-03 NOTE — PROGRESS NOTES
[facility] [place] [facility]  [facility]  [address]  [facility] [address]  Dept: [phone]  Dept Fax: [phone]  Loc: [phone]    Visit Date: 4/3/2024    Functionality Assessment/Goals Worksheet     On a scale of 0 (Does not Interfere) to 10 (Completely Interferes)     1.  Which number describes how during the past week pain has interfered with       the following:  A.  General Activity:  8  B.  Mood: 5  C.  Walking Ability:  7  D.  Normal Work (Includes both work outside the home and housework):  8  E.  Relations with Other People:   5  F.  Sleep:   7  G.  Enjoyment of Life:   5    2.  Patient Prefers to Take their Pain Medications:     []  On a regular basis   []  Only when necessary    []  Does not take pain medications    3.  What are the Patient's Goals/Expectations for Visiting Pain Management?     []  Learn about my pain    []  Receive Medication   []  Physical Therapy     []  Treat Depression   []  Receive Injections    []  Treat Sleep   []  Deal with Anxiety and Stress   []  Treat Opoid Dependence/Addiction   []  Other:        HPI:   Gladys Pepper is a 63 y.o. female is here today for    Chief Complaint: Nec k pain, low back pain     HPI   Fu from bilateral C-facet RFA @ C5-6 and C6-7 completed by Dr. Beltrán on 3/13/2024. Patient feel that she is receiving over 90% relief of neck pain from this C-facet RFA. States ROM is improved. Having less pain with activity. Has been able to tolerate doing yardwork: mowing, raking, and picking up sticks in yard. States headaches improved as well and denies any since procedure     Left knee pain remains manageable with left knee genicular nerve block- aching intermittent pain    Has intermittent aching pain in low back that is intermittent as she continues to receive over 85% relief of low back pain from Bilateral L-facet RFA  Feels she is doing well and tolerating activity.

## 2024-04-23 ENCOUNTER — OFFICE VISIT (OUTPATIENT)
Dept: PHYSICAL MEDICINE AND REHAB | Age: 64
End: 2024-04-23
Payer: MEDICARE

## 2024-04-23 VITALS
BODY MASS INDEX: 24.91 KG/M2 | SYSTOLIC BLOOD PRESSURE: 132 MMHG | HEIGHT: 66 IN | DIASTOLIC BLOOD PRESSURE: 70 MMHG | WEIGHT: 154.98 LBS

## 2024-04-23 DIAGNOSIS — M17.12 PRIMARY OSTEOARTHRITIS OF LEFT KNEE: ICD-10-CM

## 2024-04-23 DIAGNOSIS — M47.816 SPONDYLOSIS OF LUMBAR REGION WITHOUT MYELOPATHY OR RADICULOPATHY: ICD-10-CM

## 2024-04-23 DIAGNOSIS — G89.29 CHRONIC BILATERAL LOW BACK PAIN WITHOUT SCIATICA: ICD-10-CM

## 2024-04-23 DIAGNOSIS — G89.29 CHRONIC MYOFASCIAL PAIN: ICD-10-CM

## 2024-04-23 DIAGNOSIS — G89.29 CHRONIC PAIN OF LEFT KNEE: Primary | ICD-10-CM

## 2024-04-23 DIAGNOSIS — M79.18 CHRONIC MYOFASCIAL PAIN: ICD-10-CM

## 2024-04-23 DIAGNOSIS — G89.4 CHRONIC PAIN SYNDROME: ICD-10-CM

## 2024-04-23 DIAGNOSIS — M54.2 NECK PAIN: ICD-10-CM

## 2024-04-23 DIAGNOSIS — M25.562 CHRONIC PAIN OF LEFT KNEE: Primary | ICD-10-CM

## 2024-04-23 DIAGNOSIS — M47.812 SPONDYLOSIS OF CERVICAL REGION WITHOUT MYELOPATHY OR RADICULOPATHY: ICD-10-CM

## 2024-04-23 DIAGNOSIS — M47.816 LUMBAR FACET ARTHROPATHY: ICD-10-CM

## 2024-04-23 DIAGNOSIS — M54.50 CHRONIC BILATERAL LOW BACK PAIN WITHOUT SCIATICA: ICD-10-CM

## 2024-04-23 PROCEDURE — 1090F PRES/ABSN URINE INCON ASSESS: CPT | Performed by: NURSE PRACTITIONER

## 2024-04-23 PROCEDURE — 4004F PT TOBACCO SCREEN RCVD TLK: CPT | Performed by: NURSE PRACTITIONER

## 2024-04-23 PROCEDURE — G8400 PT W/DXA NO RESULTS DOC: HCPCS | Performed by: NURSE PRACTITIONER

## 2024-04-23 PROCEDURE — 99214 OFFICE O/P EST MOD 30 MIN: CPT | Performed by: NURSE PRACTITIONER

## 2024-04-23 PROCEDURE — 3017F COLORECTAL CA SCREEN DOC REV: CPT | Performed by: NURSE PRACTITIONER

## 2024-04-23 PROCEDURE — G8419 CALC BMI OUT NRM PARAM NOF/U: HCPCS | Performed by: NURSE PRACTITIONER

## 2024-04-23 PROCEDURE — 1123F ACP DISCUSS/DSCN MKR DOCD: CPT | Performed by: NURSE PRACTITIONER

## 2024-04-23 PROCEDURE — G8427 DOCREV CUR MEDS BY ELIG CLIN: HCPCS | Performed by: NURSE PRACTITIONER

## 2024-04-23 ASSESSMENT — ENCOUNTER SYMPTOMS
RESPIRATORY NEGATIVE: 1
BACK PAIN: 1

## 2024-04-23 NOTE — PROGRESS NOTES
[facility]  [address] [place] [address]  [place] [id number]  Dept: [phone]  Dept Fax: [phone]  Loc: [phone]    Visit Date: 4/23/2024    Functionality Assessment/Goals Worksheet     On a scale of 0 (Does not Interfere) to 10 (Completely Interferes)     1.  Which number describes how during the past week pain has interfered with       the following:  A.  General Activity:  9  B.  Mood: 9  C.  Walking Ability:  9  D.  Normal Work (Includes both work outside the home and housework):  9  E.  Relations with Other People:   9  F.  Sleep:   9  G.  Enjoyment of Life:   9    2.  Patient Prefers to Take their Pain Medications:     []  On a regular basis   []  Only when necessary    [x]  Does not take pain medications    3.  What are the Patient's Goals/Expectations for Visiting Pain Management?     [x]  Learn about my pain    []  Receive Medication   []  Physical Therapy     []  Treat Depression   []  Receive Injections    []  Treat Sleep   []  Deal with Anxiety and Stress   []  Treat Opoid Dependence/Addiction   []  Other:        HPI:   Gladys Pepper is a 63 y.o. female is here today for    Chief Complaint: Left knee pain, low back pain     HPI   3 week FU. Patient is here with complaints of pain in left knee that has increased over the past few weeks as she feels genicular RFA did not last as long. Sharp and grinding pain. Is limited with walking. She is asking for referral back to Ortho as she used to follow with Dr. Hill     Neck pain remains tolerable as she continues good relief from C-facet RFA- aching pain   Has intermittent aching pain in low back that is intermittent as she continues to receive over 85% relief of low back pain from Bilateral L-facet RFA   Pain increases with bending, lifting, walking, standing, getting up and down, and housework or working at job.    Takes tylenol prn for pain and Baclofen

## 2024-04-24 ENCOUNTER — OFFICE VISIT (OUTPATIENT)
Dept: PHYSICAL MEDICINE AND REHAB | Age: 64
End: 2024-04-24
Payer: MEDICARE

## 2024-04-24 VITALS
HEIGHT: 66 IN | SYSTOLIC BLOOD PRESSURE: 140 MMHG | BODY MASS INDEX: 24.75 KG/M2 | WEIGHT: 154 LBS | DIASTOLIC BLOOD PRESSURE: 68 MMHG

## 2024-04-24 DIAGNOSIS — M17.12 PRIMARY OSTEOARTHRITIS OF LEFT KNEE: Primary | ICD-10-CM

## 2024-04-24 PROCEDURE — 20610 DRAIN/INJ JOINT/BURSA W/O US: CPT | Performed by: PAIN MEDICINE

## 2024-04-24 RX ORDER — TRIAMCINOLONE ACETONIDE 40 MG/ML
80 INJECTION, SUSPENSION INTRA-ARTICULAR; INTRAMUSCULAR ONCE
Status: COMPLETED | OUTPATIENT
Start: 2024-04-24 | End: 2024-04-24

## 2024-04-24 RX ADMIN — TRIAMCINOLONE ACETONIDE 80 MG: 40 INJECTION, SUSPENSION INTRA-ARTICULAR; INTRAMUSCULAR at 12:42

## 2024-04-30 ENCOUNTER — HOSPITAL ENCOUNTER (OUTPATIENT)
Age: 64
Discharge: HOME | End: 2024-04-30
Payer: MEDICARE

## 2024-04-30 ENCOUNTER — HOSPITAL ENCOUNTER (OUTPATIENT)
Dept: GENERAL RADIOLOGY | Age: 64
Discharge: HOME | End: 2024-04-30
Payer: MEDICARE

## 2024-04-30 DIAGNOSIS — M25.562 CHRONIC PAIN OF LEFT KNEE: ICD-10-CM

## 2024-04-30 DIAGNOSIS — G89.29 CHRONIC PAIN OF LEFT KNEE: ICD-10-CM

## 2024-04-30 DIAGNOSIS — M17.12 PRIMARY OSTEOARTHRITIS OF LEFT KNEE: ICD-10-CM

## 2024-04-30 PROCEDURE — 73562 X-RAY EXAM OF KNEE 3: CPT

## 2024-05-01 ENCOUNTER — HOSPITAL ENCOUNTER (OUTPATIENT)
Dept: GENERAL RADIOLOGY | Age: 64
Discharge: HOME | End: 2024-05-01
Payer: MEDICARE

## 2024-05-01 ENCOUNTER — OFFICE VISIT (OUTPATIENT)
Dept: PHYSICAL MEDICINE AND REHAB | Age: 64
End: 2024-05-01
Payer: MEDICARE

## 2024-05-01 ENCOUNTER — HOSPITAL ENCOUNTER (OUTPATIENT)
Age: 64
Discharge: HOME | End: 2024-05-01
Payer: MEDICARE

## 2024-05-01 ENCOUNTER — TELEPHONE (OUTPATIENT)
Dept: PHYSICAL MEDICINE AND REHAB | Age: 64
End: 2024-05-01

## 2024-05-01 VITALS
SYSTOLIC BLOOD PRESSURE: 134 MMHG | DIASTOLIC BLOOD PRESSURE: 66 MMHG | WEIGHT: 154.1 LBS | BODY MASS INDEX: 24.77 KG/M2 | HEIGHT: 66 IN

## 2024-05-01 DIAGNOSIS — G89.29 CHRONIC BILATERAL LOW BACK PAIN WITHOUT SCIATICA: ICD-10-CM

## 2024-05-01 DIAGNOSIS — M25.551 BILATERAL HIP PAIN: ICD-10-CM

## 2024-05-01 DIAGNOSIS — M25.552 BILATERAL HIP PAIN: ICD-10-CM

## 2024-05-01 DIAGNOSIS — M47.816 LUMBAR FACET ARTHROPATHY: ICD-10-CM

## 2024-05-01 DIAGNOSIS — M17.12 PRIMARY OSTEOARTHRITIS OF LEFT KNEE: ICD-10-CM

## 2024-05-01 DIAGNOSIS — G89.4 CHRONIC PAIN SYNDROME: ICD-10-CM

## 2024-05-01 DIAGNOSIS — M54.50 CHRONIC BILATERAL LOW BACK PAIN WITHOUT SCIATICA: ICD-10-CM

## 2024-05-01 DIAGNOSIS — M54.2 NECK PAIN: ICD-10-CM

## 2024-05-01 DIAGNOSIS — M53.3 SI (SACROILIAC) PAIN: Primary | ICD-10-CM

## 2024-05-01 DIAGNOSIS — M25.562 CHRONIC PAIN OF LEFT KNEE: ICD-10-CM

## 2024-05-01 DIAGNOSIS — G89.29 CHRONIC PAIN OF LEFT KNEE: ICD-10-CM

## 2024-05-01 DIAGNOSIS — M47.812 SPONDYLOSIS OF CERVICAL REGION WITHOUT MYELOPATHY OR RADICULOPATHY: ICD-10-CM

## 2024-05-01 DIAGNOSIS — M47.816 SPONDYLOSIS OF LUMBAR REGION WITHOUT MYELOPATHY OR RADICULOPATHY: ICD-10-CM

## 2024-05-01 DIAGNOSIS — M79.18 CHRONIC MYOFASCIAL PAIN: ICD-10-CM

## 2024-05-01 DIAGNOSIS — M53.3 SI (SACROILIAC) PAIN: ICD-10-CM

## 2024-05-01 DIAGNOSIS — G89.29 CHRONIC MYOFASCIAL PAIN: ICD-10-CM

## 2024-05-01 PROCEDURE — 3017F COLORECTAL CA SCREEN DOC REV: CPT | Performed by: NURSE PRACTITIONER

## 2024-05-01 PROCEDURE — 1090F PRES/ABSN URINE INCON ASSESS: CPT | Performed by: NURSE PRACTITIONER

## 2024-05-01 PROCEDURE — G8400 PT W/DXA NO RESULTS DOC: HCPCS | Performed by: NURSE PRACTITIONER

## 2024-05-01 PROCEDURE — 73521 X-RAY EXAM HIPS BI 2 VIEWS: CPT

## 2024-05-01 PROCEDURE — 1123F ACP DISCUSS/DSCN MKR DOCD: CPT | Performed by: NURSE PRACTITIONER

## 2024-05-01 PROCEDURE — G8427 DOCREV CUR MEDS BY ELIG CLIN: HCPCS | Performed by: NURSE PRACTITIONER

## 2024-05-01 PROCEDURE — G8420 CALC BMI NORM PARAMETERS: HCPCS | Performed by: NURSE PRACTITIONER

## 2024-05-01 PROCEDURE — 99214 OFFICE O/P EST MOD 30 MIN: CPT | Performed by: NURSE PRACTITIONER

## 2024-05-01 PROCEDURE — 72100 X-RAY EXAM L-S SPINE 2/3 VWS: CPT

## 2024-05-01 PROCEDURE — 4004F PT TOBACCO SCREEN RCVD TLK: CPT | Performed by: NURSE PRACTITIONER

## 2024-05-01 ASSESSMENT — ENCOUNTER SYMPTOMS
BACK PAIN: 1
RESPIRATORY NEGATIVE: 1

## 2024-05-01 NOTE — PROGRESS NOTES
prior to procedure   Procedure notes reviewed  Is receiving 90% relief of left knee pain from injection  Continues to receive over 80% relief of neck pain and low back pain from bilateral L-facet RFA.   Continue tylenol prn and Baclofen from pcp. Patient uses marijuana     Meds. Prescribed:   No orders of the defined types were placed in this encounter.      Return for bilateral therapeutic SI injection under fluoroscopy. , follow up after procedure.               Electronically signed by JAIME Phelan CNP on5/1/2024 at 10:19 AM      yes

## 2024-05-02 NOTE — DISCHARGE INSTRUCTIONS
ANESTHESIA INSTRUCTIONS FOLLOWING SURGERY        Since you may experience some intermittent light-headedness for the next several hours, we suggest you plan on bed rest or quiet relaxation this evening. You must have a friend or relative stay with you tonight.    Because of the sedation you have received, it is recommended that you do not drive a motor vehicle, operate any kind of machinery, or sign any contractual agreement for 24 hours following the procedure.    You should not take alcoholic beverages tonight and only take sleeping medication that has been specifically prescribed for you by your physician.  Call office [phone] if you have:  Temperature greater than 100.4  Persistent nausea and vomiting  Severe uncontrolled pain  Redness, tenderness, or signs of infection (pain, swelling, redness, odor or green/yellow discharge around the site)  Difficulty breathing, headache or visual disturbances  Hives  Persistent dizziness or light-headedness  Extreme fatigue  Any other questions or concerns you may have after discharge    In an emergency, call 911 or go to an Emergency Department at a nearby hospital    It is important to bring a complete, current list of your medications to any medical appointments or hospitalizations.    REMINDER:   Carry a list of your medications and allergies with you at all times  Call your pharmacy at least 1 week in advance to refill prescriptions    Diet: Resume your usual diet. Good nutrition promotes healing. Increase fluid intake.     Activity: Rest for 24 hrs then resume normal activity.    HOME MEDICATIONS:      If on blood thinning products such as; Aspirin, NSAIDS, Plavix, Coumadin, Xarelto, Fish Oil, Multi-Vitamins or Herbal Supplements restart in 24 hours      Restart Metformin in 48 hours if you had procedure with dye.      Restart Metformin in 24 hours if no dye used during procedure.        Education Materials Received: {yes/no:969407}  Belongings Returned:

## 2024-05-07 ENCOUNTER — TELEPHONE (OUTPATIENT)
Dept: PHYSICAL MEDICINE AND REHAB | Age: 64
End: 2024-05-07

## 2024-05-07 NOTE — TELEPHONE ENCOUNTER
I had to change the time of patient's 05/08/2024 procedure due to an anesthesia case.  Moved patient's appointment to an earlier time slot.  Called patient to inform her, and left a voice message requesting a call back.

## 2024-05-08 ENCOUNTER — APPOINTMENT (OUTPATIENT)
Dept: GENERAL RADIOLOGY | Age: 64
End: 2024-05-08
Attending: PAIN MEDICINE
Payer: MEDICARE

## 2024-05-08 ENCOUNTER — HOSPITAL ENCOUNTER (OUTPATIENT)
Age: 64
Setting detail: OUTPATIENT SURGERY
Discharge: HOME | End: 2024-05-08
Attending: PAIN MEDICINE | Admitting: PAIN MEDICINE
Payer: MEDICARE

## 2024-05-08 VITALS
HEIGHT: 66 IN | OXYGEN SATURATION: 98 % | RESPIRATION RATE: 16 BRPM | SYSTOLIC BLOOD PRESSURE: 167 MMHG | DIASTOLIC BLOOD PRESSURE: 85 MMHG | TEMPERATURE: 96.9 F | WEIGHT: 158.8 LBS | BODY MASS INDEX: 25.52 KG/M2 | HEART RATE: 74 BPM

## 2024-05-08 PROBLEM — M46.1 SACROILIAC INFLAMMATION (HCC): Status: ACTIVE | Noted: 2024-05-08

## 2024-05-08 PROCEDURE — 2500000003 HC RX 250 WO HCPCS: Performed by: PAIN MEDICINE

## 2024-05-08 PROCEDURE — 27096 INJECT SACROILIAC JOINT: CPT | Performed by: PAIN MEDICINE

## 2024-05-08 PROCEDURE — 2709999900 HC NON-CHARGEABLE SUPPLY: Performed by: PAIN MEDICINE

## 2024-05-08 PROCEDURE — 6360000002 HC RX W HCPCS: Performed by: PAIN MEDICINE

## 2024-05-08 PROCEDURE — 7100000010 HC PHASE II RECOVERY - FIRST 15 MIN: Performed by: PAIN MEDICINE

## 2024-05-08 PROCEDURE — 6360000004 HC RX CONTRAST MEDICATION: Performed by: PAIN MEDICINE

## 2024-05-08 PROCEDURE — 3600000054 HC PAIN LEVEL 3 BASE: Performed by: PAIN MEDICINE

## 2024-05-08 RX ORDER — LIDOCAINE HYDROCHLORIDE 20 MG/ML
INJECTION, SOLUTION INFILTRATION; PERINEURAL PRN
Status: DISCONTINUED | OUTPATIENT
Start: 2024-05-08 | End: 2024-05-08 | Stop reason: ALTCHOICE

## 2024-05-08 RX ORDER — BUPIVACAINE HYDROCHLORIDE 5 MG/ML
INJECTION, SOLUTION PERINEURAL PRN
Status: DISCONTINUED | OUTPATIENT
Start: 2024-05-08 | End: 2024-05-08 | Stop reason: ALTCHOICE

## 2024-05-08 RX ORDER — DEXAMETHASONE SODIUM PHOSPHATE 4 MG/ML
INJECTION, SOLUTION INTRA-ARTICULAR; INTRALESIONAL; INTRAMUSCULAR; INTRAVENOUS; SOFT TISSUE PRN
Status: DISCONTINUED | OUTPATIENT
Start: 2024-05-08 | End: 2024-05-08 | Stop reason: ALTCHOICE

## 2024-05-08 ASSESSMENT — PAIN - FUNCTIONAL ASSESSMENT
PAIN_FUNCTIONAL_ASSESSMENT: 0-10
PAIN_FUNCTIONAL_ASSESSMENT: 0-10

## 2024-05-08 ASSESSMENT — PAIN DESCRIPTION - DESCRIPTORS: DESCRIPTORS: ACHING

## 2024-05-08 NOTE — PROGRESS NOTES
0841 Pt to phase 2 recovery per cart. Pt awake and alert, denies pain, Report from Isis GARLAND. No bleeding at injection sites. VSS   Pt refused snack or drink.   0844 Pt dressing in room .   0849 Pt discharged ambulatory with staff. Gait steady. Pt denies complaints.

## 2024-05-08 NOTE — H&P
H&P    Patient main complaint of pain in bilateral SI area buttocks radiating into hips- stabbing, pressure, burning and aching and is limiting walking, standing, and bending and movement     Continues to have pain in low back aching pain has ups and downs but states it has been manageable as she continues relief from bilateral L-facet RFA   Neck pain remains tolerable as she continues good relief from C-facet RFA- aching pain   Pain increases with bending, lifting, twisting , turning head, standing, raising arms, getting up and down, and housework or working at job.     Takes tylenol prn for pain and Baclofen, is using biofreeze prn and heating pad, continues marijuana without a card    Prior Injections:  Bilateral L-facet RFA @ L4-5 and L5-S1 from October 4th 2022 95% relief of low back pain for about 1 full year      Bilateral C-facet RFA @ C5-6, and C6-7 completed 12/20/2022. 85% relief for about a year         left knee genicular nerve block from 3/7/2023     left genicular knee RFA from 5/22/2023 90% relief from over 6 months      left knee genicular RFA completed by Dr. Beltrán on 12/13/2023 70% to 80% relief for 3.5 to 4 months      bilateral L-facet RFA @ L4-5 and L5-S1 completed by Dr. Beltrán on 1/30/2024 90% relief      bilateral C-facet RFA @ C5-6 and C6-7 completed by Dr. Beltrán on 3/13/2024 over 90% relief      left knee steroid injection completed by Dr. Beltrán on 4/24/2024 90% relief      Radiology:  Lumbar MRI:   FINDINGS:  There is minimal, grade 1, anterolisthesis of L3 relative to L4 on the basis of degenerative change. There is otherwise anatomic vertebral body height and alignment. There is hyperintense T1 and T2 signal at the opposing endplates of L2-3 with associated   small osteophytes as evidence for Modic 2 degenerative change. There are patchy areas of hyperintense T1 and T2 signal throughout the lumbar vertebral column as evidence for hemangiomas and/or focal fatty infiltration. Marrow

## 2024-05-08 NOTE — OP NOTE
Pre-Procedure Note    Patient Name: Gladys Pepper   YOB: 1960  Medical Record Number: [id number]  Date: 5/8/24     Indication:  SI pain    Consent: On file.    Vital Signs:   Vitals:    05/08/24 0752   BP: (!) 160/79   Pulse: 86   Resp: 16   Temp: (!) 96.6 °F (35.9 °C)   SpO2: 96%       Past Medical History:   has a past medical history of Anxiety, Chronic back pain, COPD (chronic obstructive pulmonary disease) ([facility]), Depression, Disc disorder, Emphysema of lung ([facility]), Fibromyalgia, Hyperlipidemia, Hypertension, Osteoarthritis, Osteoporosis, and Sleep apnea.    Past Surgical History:   has a past surgical history that includes Tonsillectomy (1972); Knee arthroscopy (Bilateral, 2019); Facet joint injection (Bilateral, 5/14/2020); Facet joint injection (Bilateral, 6/17/2020); Pain management procedure (Bilateral, 7/22/2020); Pain management procedure (Right, 10/7/2020); Pain management procedure (Left, 11/4/2020); Facet joint injection (Bilateral, 1/20/2021); Pain management procedure (Left, 6/8/2021); Pain management procedure (Right, 7/27/2021); Facet joint injection (Bilateral, 12/21/2021); Facet joint injection (Bilateral, 2/11/2022); Pain management procedure (Bilateral, 10/4/2022); Pain management procedure (Bilateral, 12/20/2022); Pain management procedure (Left, 3/7/2023); Pain management procedure (Left, 5/22/2023); Pain management procedure (Left, 12/13/2023); Pain management procedure (Bilateral, 1/30/2024); and Pain management procedure (N/A, 3/13/2024).    Pre-Sedation Documentation and Exam:   Vital signs have been reviewed (see flow sheet for vitals).     Sedation/ Anesthesia Plan: LOCAL    Patient is an appropriate candidate for plan of sedation: yes    Preoperative Diagnosis:  Bilateral sacroiliitis.    Postoperative Diagnosis: Bilateral  Sacroiliitis.    Procedure Performed:  Bilateral sacroiliac joint injection under fluoroscopy guidance .      Indication for the Procedure:  The

## 2024-05-22 ENCOUNTER — OFFICE VISIT (OUTPATIENT)
Dept: PHYSICAL MEDICINE AND REHAB | Age: 64
End: 2024-05-22
Payer: MEDICARE

## 2024-05-22 VITALS
SYSTOLIC BLOOD PRESSURE: 132 MMHG | DIASTOLIC BLOOD PRESSURE: 80 MMHG | HEIGHT: 66 IN | WEIGHT: 158.73 LBS | BODY MASS INDEX: 25.51 KG/M2

## 2024-05-22 DIAGNOSIS — G89.29 CHRONIC PAIN OF LEFT KNEE: ICD-10-CM

## 2024-05-22 DIAGNOSIS — G89.29 CHRONIC BILATERAL LOW BACK PAIN WITHOUT SCIATICA: ICD-10-CM

## 2024-05-22 DIAGNOSIS — G89.4 CHRONIC PAIN SYNDROME: ICD-10-CM

## 2024-05-22 DIAGNOSIS — M25.562 CHRONIC PAIN OF LEFT KNEE: ICD-10-CM

## 2024-05-22 DIAGNOSIS — M54.50 CHRONIC BILATERAL LOW BACK PAIN WITHOUT SCIATICA: ICD-10-CM

## 2024-05-22 DIAGNOSIS — M54.2 NECK PAIN: ICD-10-CM

## 2024-05-22 DIAGNOSIS — M47.812 SPONDYLOSIS OF CERVICAL REGION WITHOUT MYELOPATHY OR RADICULOPATHY: ICD-10-CM

## 2024-05-22 DIAGNOSIS — M17.12 PRIMARY OSTEOARTHRITIS OF LEFT KNEE: ICD-10-CM

## 2024-05-22 DIAGNOSIS — G89.29 CHRONIC MYOFASCIAL PAIN: ICD-10-CM

## 2024-05-22 DIAGNOSIS — M53.3 SI (SACROILIAC) PAIN: ICD-10-CM

## 2024-05-22 DIAGNOSIS — M47.816 LUMBAR FACET ARTHROPATHY: ICD-10-CM

## 2024-05-22 DIAGNOSIS — M79.18 CHRONIC MYOFASCIAL PAIN: ICD-10-CM

## 2024-05-22 DIAGNOSIS — M47.816 SPONDYLOSIS OF LUMBAR REGION WITHOUT MYELOPATHY OR RADICULOPATHY: Primary | ICD-10-CM

## 2024-05-22 PROCEDURE — G8400 PT W/DXA NO RESULTS DOC: HCPCS | Performed by: NURSE PRACTITIONER

## 2024-05-22 PROCEDURE — 1090F PRES/ABSN URINE INCON ASSESS: CPT | Performed by: NURSE PRACTITIONER

## 2024-05-22 PROCEDURE — G8427 DOCREV CUR MEDS BY ELIG CLIN: HCPCS | Performed by: NURSE PRACTITIONER

## 2024-05-22 PROCEDURE — 1123F ACP DISCUSS/DSCN MKR DOCD: CPT | Performed by: NURSE PRACTITIONER

## 2024-05-22 PROCEDURE — 99214 OFFICE O/P EST MOD 30 MIN: CPT | Performed by: NURSE PRACTITIONER

## 2024-05-22 PROCEDURE — G8419 CALC BMI OUT NRM PARAM NOF/U: HCPCS | Performed by: NURSE PRACTITIONER

## 2024-05-22 PROCEDURE — 3017F COLORECTAL CA SCREEN DOC REV: CPT | Performed by: NURSE PRACTITIONER

## 2024-05-22 PROCEDURE — 4004F PT TOBACCO SCREEN RCVD TLK: CPT | Performed by: NURSE PRACTITIONER

## 2024-05-22 RX ORDER — DEXTROMETHORPHAN HYDROBROMIDE AND PROMETHAZINE HYDROCHLORIDE 15; 6.25 MG/5ML; MG/5ML
SYRUP ORAL
COMMUNITY
Start: 2024-05-08

## 2024-05-22 RX ORDER — OMEPRAZOLE 20 MG/1
CAPSULE, DELAYED RELEASE ORAL
COMMUNITY
Start: 2024-05-08

## 2024-05-22 RX ORDER — BENZONATATE 200 MG/1
CAPSULE ORAL
COMMUNITY
Start: 2024-05-08

## 2024-05-22 RX ORDER — AMITRIPTYLINE HYDROCHLORIDE 25 MG/1
TABLET, FILM COATED ORAL
COMMUNITY
Start: 2024-04-09

## 2024-05-22 ASSESSMENT — ENCOUNTER SYMPTOMS
BACK PAIN: 1
APNEA: 1

## 2024-05-22 NOTE — PROGRESS NOTES
[facility] [place] [facility]  [facility]  [address]  [facility] [address]  Dept: [phone]  Dept Fax: [phone]  Loc: [phone]    Visit Date: 5/22/2024    Functionality Assessment/Goals Worksheet     On a scale of 0 (Does not Interfere) to 10 (Completely Interferes)     1.  Which number describes how during the past week pain has interfered with       the following:  A.  General Activity:  4  B.  Mood: 2  C.  Walking Ability:  4  D.  Normal Work (Includes both work outside the home and housework):  4  E.  Relations with Other People:   2  F.  Sleep:   4  G.  Enjoyment of Life:   2    2.  Patient Prefers to Take their Pain Medications:     []  On a regular basis   []  Only when necessary    [x]  Does not take pain medications    3.  What are the Patient's Goals/Expectations for Visiting Pain Management?     [x]  Learn about my pain    [x]  Receive Medication   []  Physical Therapy     []  Treat Depression   [x]  Receive Injections    []  Treat Sleep   []  Deal with Anxiety and Stress   []  Treat Opoid Dependence/Addiction   []  Other:        HPI:   Gladys Pepper is a 63 y.o. female is here today for    Chief Complaint: Low back pain    HPI   FU from bilateral therapeutic SI injection completed by Dr. Beltrán on 5/8/2024. Patient is receiving about 90% relief of Si pain from this procedure. States pain has been minimal since procedure. Just intermittent tugging pain.   Low back pain remains very tolerable as she continues to receive good relief from bilateral L-facet RFA intermittent aching. Is tolerating full activity . \"I am happy\"    Left knee pain remains tolerable from procedure   Neck pain remains tolerable as she continues good relief from C-facet RFA- aching pain     Has not needed tylenol prn lately. Continues  Baclofen, is using biofreeze prn and heating pad, bio freeze prn, continues marijuana without a card   Pain

## 2024-09-16 ENCOUNTER — OFFICE VISIT (OUTPATIENT)
Dept: PHYSICAL MEDICINE AND REHAB | Age: 64
End: 2024-09-16
Payer: MEDICARE

## 2024-09-16 ENCOUNTER — TELEPHONE (OUTPATIENT)
Dept: PHYSICAL MEDICINE AND REHAB | Age: 64
End: 2024-09-16

## 2024-09-16 VITALS
WEIGHT: 158.73 LBS | BODY MASS INDEX: 25.51 KG/M2 | HEIGHT: 66 IN | DIASTOLIC BLOOD PRESSURE: 82 MMHG | SYSTOLIC BLOOD PRESSURE: 132 MMHG

## 2024-09-16 DIAGNOSIS — M47.816 SPONDYLOSIS OF LUMBAR REGION WITHOUT MYELOPATHY OR RADICULOPATHY: ICD-10-CM

## 2024-09-16 DIAGNOSIS — M25.562 CHRONIC PAIN OF LEFT KNEE: ICD-10-CM

## 2024-09-16 DIAGNOSIS — G89.4 CHRONIC PAIN SYNDROME: ICD-10-CM

## 2024-09-16 DIAGNOSIS — M47.816 LUMBAR FACET ARTHROPATHY: ICD-10-CM

## 2024-09-16 DIAGNOSIS — G89.29 CHRONIC PAIN OF LEFT KNEE: ICD-10-CM

## 2024-09-16 DIAGNOSIS — M79.18 CHRONIC MYOFASCIAL PAIN: ICD-10-CM

## 2024-09-16 DIAGNOSIS — M54.2 NECK PAIN: ICD-10-CM

## 2024-09-16 DIAGNOSIS — G89.29 CHRONIC BILATERAL LOW BACK PAIN WITHOUT SCIATICA: ICD-10-CM

## 2024-09-16 DIAGNOSIS — M53.3 SI (SACROILIAC) PAIN: ICD-10-CM

## 2024-09-16 DIAGNOSIS — G89.29 CHRONIC MYOFASCIAL PAIN: ICD-10-CM

## 2024-09-16 DIAGNOSIS — M47.812 SPONDYLOSIS OF CERVICAL REGION WITHOUT MYELOPATHY OR RADICULOPATHY: Primary | ICD-10-CM

## 2024-09-16 DIAGNOSIS — M17.12 PRIMARY OSTEOARTHRITIS OF LEFT KNEE: ICD-10-CM

## 2024-09-16 DIAGNOSIS — M54.50 CHRONIC BILATERAL LOW BACK PAIN WITHOUT SCIATICA: ICD-10-CM

## 2024-09-16 PROCEDURE — 4004F PT TOBACCO SCREEN RCVD TLK: CPT | Performed by: NURSE PRACTITIONER

## 2024-09-16 PROCEDURE — G8419 CALC BMI OUT NRM PARAM NOF/U: HCPCS | Performed by: NURSE PRACTITIONER

## 2024-09-16 PROCEDURE — 99214 OFFICE O/P EST MOD 30 MIN: CPT | Performed by: NURSE PRACTITIONER

## 2024-09-16 PROCEDURE — G8427 DOCREV CUR MEDS BY ELIG CLIN: HCPCS | Performed by: NURSE PRACTITIONER

## 2024-09-16 PROCEDURE — 3017F COLORECTAL CA SCREEN DOC REV: CPT | Performed by: NURSE PRACTITIONER

## 2024-09-16 PROCEDURE — 1123F ACP DISCUSS/DSCN MKR DOCD: CPT | Performed by: NURSE PRACTITIONER

## 2024-09-16 PROCEDURE — 1090F PRES/ABSN URINE INCON ASSESS: CPT | Performed by: NURSE PRACTITIONER

## 2024-09-16 PROCEDURE — G8400 PT W/DXA NO RESULTS DOC: HCPCS | Performed by: NURSE PRACTITIONER

## 2024-09-16 ASSESSMENT — ENCOUNTER SYMPTOMS
RESPIRATORY NEGATIVE: 1
BACK PAIN: 1

## 2024-10-07 NOTE — DISCHARGE INSTRUCTIONS
ANESTHESIA INSTRUCTIONS FOLLOWING SURGERY        Since you may experience some intermittent light-headedness for the next several hours, we suggest you plan on bed rest or quiet relaxation this evening. You must have a friend or relative stay with you tonight.    Because of the sedation you have received, it is recommended that you do not drive a motor vehicle, operate any kind of machinery, or sign any contractual agreement for 24 hours following the procedure.    You should not take alcoholic beverages tonight and only take sleeping medication that has been specifically prescribed for you by your physician.  Call office [phone] if you have:  Temperature greater than 100.4  Persistent nausea and vomiting  Severe uncontrolled pain  Redness, tenderness, or signs of infection (pain, swelling, redness, odor or green/yellow discharge around the site)  Difficulty breathing, headache or visual disturbances  Hives  Persistent dizziness or light-headedness  Extreme fatigue  Any other questions or concerns you may have after discharge    In an emergency, call 911 or go to an Emergency Department at a nearby hospital    It is important to bring a complete, current list of your medications to any medical appointments or hospitalizations.    REMINDER:   Carry a list of your medications and allergies with you at all times  Call your pharmacy at least 1 week in advance to refill prescriptions    Diet: Resume your usual diet. Good nutrition promotes healing. Increase fluid intake.     Activity: Rest for 24 hrs then resume normal activity.    HOME MEDICATIONS:      If on blood thinning products such as; Aspirin, NSAIDS, Plavix, Coumadin, Xarelto, Fish Oil, Multi-Vitamins or Herbal Supplements restart in 24 hours      Restart Metformin in 48 hours if you had procedure with dye.      Restart Metformin in 24 hours if no dye used during procedure.        Education Materials Received: {yes/no:692938}  Belongings Returned:

## 2024-10-09 ENCOUNTER — HOSPITAL ENCOUNTER (OUTPATIENT)
Age: 64
Setting detail: OUTPATIENT SURGERY
Discharge: HOME | End: 2024-10-09
Attending: PAIN MEDICINE | Admitting: PAIN MEDICINE
Payer: MEDICARE

## 2024-10-09 ENCOUNTER — APPOINTMENT (OUTPATIENT)
Dept: GENERAL RADIOLOGY | Age: 64
End: 2024-10-09
Attending: PAIN MEDICINE
Payer: MEDICARE

## 2024-10-09 VITALS
HEART RATE: 66 BPM | DIASTOLIC BLOOD PRESSURE: 74 MMHG | BODY MASS INDEX: 26.03 KG/M2 | OXYGEN SATURATION: 97 % | RESPIRATION RATE: 16 BRPM | WEIGHT: 162 LBS | TEMPERATURE: 96.3 F | SYSTOLIC BLOOD PRESSURE: 142 MMHG | HEIGHT: 66 IN

## 2024-10-09 PROCEDURE — 3600000057 HC PAIN LEVEL 4 ADDL 15 MIN: Performed by: PAIN MEDICINE

## 2024-10-09 PROCEDURE — 3600000056 HC PAIN LEVEL 4 BASE: Performed by: PAIN MEDICINE

## 2024-10-09 PROCEDURE — 6360000002 HC RX W HCPCS: Performed by: PAIN MEDICINE

## 2024-10-09 PROCEDURE — 64634 DESTROY C/TH FACET JNT ADDL: CPT | Performed by: PAIN MEDICINE

## 2024-10-09 PROCEDURE — 99152 MOD SED SAME PHYS/QHP 5/>YRS: CPT | Performed by: PAIN MEDICINE

## 2024-10-09 PROCEDURE — 7100000010 HC PHASE II RECOVERY - FIRST 15 MIN: Performed by: PAIN MEDICINE

## 2024-10-09 PROCEDURE — 64633 DESTROY CERV/THOR FACET JNT: CPT | Performed by: PAIN MEDICINE

## 2024-10-09 PROCEDURE — 2500000003 HC RX 250 WO HCPCS: Performed by: PAIN MEDICINE

## 2024-10-09 PROCEDURE — 7100000011 HC PHASE II RECOVERY - ADDTL 15 MIN: Performed by: PAIN MEDICINE

## 2024-10-09 PROCEDURE — 2709999900 HC NON-CHARGEABLE SUPPLY: Performed by: PAIN MEDICINE

## 2024-10-09 RX ORDER — BUPIVACAINE HYDROCHLORIDE 5 MG/ML
INJECTION, SOLUTION PERINEURAL PRN
Status: DISCONTINUED | OUTPATIENT
Start: 2024-10-09 | End: 2024-10-09 | Stop reason: ALTCHOICE

## 2024-10-09 RX ORDER — LIDOCAINE HYDROCHLORIDE 20 MG/ML
INJECTION, SOLUTION INFILTRATION; PERINEURAL PRN
Status: DISCONTINUED | OUTPATIENT
Start: 2024-10-09 | End: 2024-10-09 | Stop reason: ALTCHOICE

## 2024-10-09 RX ORDER — MIDAZOLAM HYDROCHLORIDE 1 MG/ML
INJECTION INTRAMUSCULAR; INTRAVENOUS PRN
Status: DISCONTINUED | OUTPATIENT
Start: 2024-10-09 | End: 2024-10-09 | Stop reason: ALTCHOICE

## 2024-10-09 RX ORDER — FENTANYL CITRATE 50 UG/ML
INJECTION, SOLUTION INTRAMUSCULAR; INTRAVENOUS PRN
Status: DISCONTINUED | OUTPATIENT
Start: 2024-10-09 | End: 2024-10-09 | Stop reason: ALTCHOICE

## 2024-10-09 ASSESSMENT — PAIN - FUNCTIONAL ASSESSMENT
PAIN_FUNCTIONAL_ASSESSMENT: NONE - DENIES PAIN
PAIN_FUNCTIONAL_ASSESSMENT: 0-10

## 2024-10-09 NOTE — H&P
(sacroiliac) pain    7. Chronic pain of left knee    8. Primary osteoarthritis of left knee    9. Chronic myofascial pain    10. Chronic pain syndrome                Assessment & Plan  Plan:      OARRS reviewed. Current MED: 0  Patient was not offered naloxone for home.   Discussed long term side effects of medications, tolerance, dependency and addiction.  Previous UDS reviewed  UDS preformed today for compliance.  Patient told can not receive any pain medications from any other source.  No evidence of abuse, diversion or aberrant behavior.  Medications and/or procedures to improve function and quality of life- patient understanding with this and that may not be pain free  Discussed with patient about safe storage of medications at home  Discussed possible weaning of medication dosing dependent on treatment/procedure results.   Discussed with patient about risks with procedure including infection, reaction to medication, increased pain, or bleeding.  Procedure notes reviewed in detail   Plan bilateral C-facet RFA @ C5-6, and C6-7 under fluoroscopy for longer term pain relief. Procedure and risks discussed in detail with patient. Received over 90% relief from previous RFA at same level for 6 months with movement of mobility and headaches as well.   Needs to hold baby aspirin prior to procedure   Discussed repeating bilateral L-facet RFA in future and left knee injection   Continue tylenol prn and Baclofen from pcp. Is using medical marijuana from [place] so no opioids         Meds. Prescribed:     Encounter Medications   No orders of the defined types were placed in this encounter.         Return for bilateral C-facet RFA @ C5-6, and C6-7 under fluoroscopy. , follow up after procedure.

## 2024-10-09 NOTE — PROGRESS NOTES
1354- Patient to Phase II via cart. Report received from Karol GARLAND. Patient drowsy but responsive.Vitals obtained and stable. Respirations even and unlabored on room air. Patient denies pain, nausea, numbness and tingling. Patient able to move all extremities. No drainage noted at injection sites. Patient instructed to stay in bed. Instructed on call light use.     1356- Pt drinking fluid at this time.    1405- Pt's IV removed at this time    Pt getting changed at this time    Patient meets discharge criteria.  Discharged in stable condition with responsible . All belongings given to patient. Patient ambulated to car with assistance from RN. Patient tolerated well.

## 2024-10-09 NOTE — PRE SEDATION
[facility]  Pre-Sedation/Analgesia History & Physical    Pt Name: Gladys Pepper  MRN: [id number]  YOB: 1960  Provider Performing Procedure: Mark White MD  Primary Care Physician: Addie Izaguirre, APRN - JORGE L    PRE-PROCEDURE   DNR-CCA/DNR-CC : No  Brief History/Pre-Procedure Diagnosis:     Patient  reports that over the past 2 weeks she has had increased pain in her posterior neck as she feels relief from her previous bilateral C-facet RFA has been wearing off. Pain is in posterior neck constant aching pain with periods of sharp pains and throbbing. States that she has been getting headaches in back of head more frequently.   Denies any radicular pain   Also having increase low back pain and left knee pain as she feels injections are wearing off. Neck pain is main complaint though   Pain increases with bending, lifting, twisting , turning head, walking, standing, raising arms, getting up and down, and housework or working at job.     Is using Baclofen from her pcp, uses marijuana which helps       Prior Injections:  Bilateral L-facet RFA @ L4-5 and L5-S1 from October 4th 2022 95% relief of low back pain for about 1 full year      Bilateral C-facet RFA @ C5-6, and C6-7 completed 12/20/2022. 85% relief for about a year         left knee genicular nerve block from 3/7/2023     left genicular knee RFA from 5/22/2023 90% relief from over 6 months      left knee genicular RFA completed by Dr. Beltrán on 12/13/2023 70% to 80% relief for 3.5 to 4 months      bilateral L-facet RFA @ L4-5 and L5-S1 completed by Dr. Beltrán on 1/30/2024 90% relief      bilateral C-facet RFA @ C5-6 and C6-7 completed by Dr. Beltrán on 3/13/2024 over 90% relief for 6 months      left knee steroid injection completed by Dr. Beltrán on 4/24/2024 90% relief     Bilateral therapeutic SI injection completed by Dr. Beltrán on 5/8/2024 90% relief.         Radiology:  Cervical xray:   FINDINGS:        There is

## 2024-10-09 NOTE — OP NOTE
guidance      Indication for the Procedure:  The patient had history of chronic neck pain that is not responding well to the conservative treatment.  Patient's pain is mostly axial in nature.  Pain is interfering with the activities of daily living.  Physical examination revealed facet tenderness and facet loading is positive.  The patient had undergone cervical  facet joint injections with pain relief that lasted for only a short period of time and confirmatory median branch block gave patient pain relief of 80 % .  Hence we decided to do radiofrequency abalation of median branches for long term pain relief.  The procedure and risks were discussed with the patient and an informed consent was obtained.     Procedure:      A meaningful communication was kept up with the patient throughout  the procedure.  Patient is placed in prone position and skin over the back was prepped and draped in sterile manner.  Then using fluoroscopy the waist of facet joint complex of the vertebra was observed and the view was optimized .The skin and deep tissues posterior were infiltrated with 8 ml of 2% Xylocaine. The RF canula with the 10 mm active tip was introduced through the skin wheal under fluoroscopy guidance such that the tip of the needle lies in the groove of the waist of facet joint complex.  Then a lateral view of cervical  spine was obtained to make sure the tip of needle is in the centroid of the articular pillar.  hen electric impedence was checked to make sure it is acceptable.  Then a sensory stimulus was applied at 50 Hz up to 1 volt and concordant pain symptoms were reproduced.  Then a motor stimulus was applied at 2 Hz up to 2 volts and no motor stimulation was seen in upper extremities.  Some multifidus stimulus was seen.  Then after negative aspiration 0.5%  Marcaine 4 cc was injected through the needle in divided doses . Then an  lesion was done at 80 degrees centigrade for 90 seconds.         EBL-0     Patient

## 2024-10-09 NOTE — POST SEDATION
IV sedation was used during the procedure:  - Moderate intravenous conscious sedation was supervised by Dr. Beltrán  - The patient was independently monitored by a Registered Nurse assigned to the procedure room  - Monitoring included automated blood pressure, continuous EKG, and continuous pulse oximetry  - The detailed conscious record is permanently stored in the Hospital Information System  - The following is the conscious sedation record:  Start Time: 1332  End Time : 1353  Duration: 15 minutes   Medications Administered: 2 mg Versed, 50 mcg Fentanyl  Complications: none  EBL-0

## 2024-10-30 ENCOUNTER — OFFICE VISIT (OUTPATIENT)
Dept: PHYSICAL MEDICINE AND REHAB | Age: 64
End: 2024-10-30
Payer: MEDICARE

## 2024-10-30 VITALS
HEIGHT: 66 IN | WEIGHT: 162.04 LBS | BODY MASS INDEX: 26.04 KG/M2 | DIASTOLIC BLOOD PRESSURE: 80 MMHG | SYSTOLIC BLOOD PRESSURE: 128 MMHG

## 2024-10-30 DIAGNOSIS — G89.29 CHRONIC BILATERAL LOW BACK PAIN WITHOUT SCIATICA: ICD-10-CM

## 2024-10-30 DIAGNOSIS — M47.816 SPONDYLOSIS OF LUMBAR REGION WITHOUT MYELOPATHY OR RADICULOPATHY: Primary | ICD-10-CM

## 2024-10-30 DIAGNOSIS — M79.18 CHRONIC MYOFASCIAL PAIN: ICD-10-CM

## 2024-10-30 DIAGNOSIS — M54.50 CHRONIC BILATERAL LOW BACK PAIN WITHOUT SCIATICA: ICD-10-CM

## 2024-10-30 DIAGNOSIS — G89.4 CHRONIC PAIN SYNDROME: ICD-10-CM

## 2024-10-30 DIAGNOSIS — M47.812 SPONDYLOSIS OF CERVICAL REGION WITHOUT MYELOPATHY OR RADICULOPATHY: ICD-10-CM

## 2024-10-30 DIAGNOSIS — M25.551 BILATERAL HIP PAIN: ICD-10-CM

## 2024-10-30 DIAGNOSIS — M53.3 SI (SACROILIAC) PAIN: ICD-10-CM

## 2024-10-30 DIAGNOSIS — M25.552 BILATERAL HIP PAIN: ICD-10-CM

## 2024-10-30 DIAGNOSIS — M47.816 LUMBAR FACET ARTHROPATHY: ICD-10-CM

## 2024-10-30 DIAGNOSIS — M54.2 NECK PAIN: ICD-10-CM

## 2024-10-30 DIAGNOSIS — G89.29 CHRONIC MYOFASCIAL PAIN: ICD-10-CM

## 2024-10-30 PROCEDURE — G8484 FLU IMMUNIZE NO ADMIN: HCPCS | Performed by: NURSE PRACTITIONER

## 2024-10-30 PROCEDURE — G8419 CALC BMI OUT NRM PARAM NOF/U: HCPCS | Performed by: NURSE PRACTITIONER

## 2024-10-30 PROCEDURE — 1125F AMNT PAIN NOTED PAIN PRSNT: CPT | Performed by: NURSE PRACTITIONER

## 2024-10-30 PROCEDURE — 1090F PRES/ABSN URINE INCON ASSESS: CPT | Performed by: NURSE PRACTITIONER

## 2024-10-30 PROCEDURE — 4004F PT TOBACCO SCREEN RCVD TLK: CPT | Performed by: NURSE PRACTITIONER

## 2024-10-30 PROCEDURE — 1123F ACP DISCUSS/DSCN MKR DOCD: CPT | Performed by: NURSE PRACTITIONER

## 2024-10-30 PROCEDURE — 99214 OFFICE O/P EST MOD 30 MIN: CPT | Performed by: NURSE PRACTITIONER

## 2024-10-30 PROCEDURE — G8400 PT W/DXA NO RESULTS DOC: HCPCS | Performed by: NURSE PRACTITIONER

## 2024-10-30 PROCEDURE — G8428 CUR MEDS NOT DOCUMENT: HCPCS | Performed by: NURSE PRACTITIONER

## 2024-10-30 PROCEDURE — 3017F COLORECTAL CA SCREEN DOC REV: CPT | Performed by: NURSE PRACTITIONER

## 2024-10-30 ASSESSMENT — ENCOUNTER SYMPTOMS
RESPIRATORY NEGATIVE: 1
BACK PAIN: 1

## 2024-10-30 NOTE — PROGRESS NOTES
[facility] [place] [facility]  [facility]  [address]  [place] [facility] [place] [facility] [address]  Dept: [phone]  Dept Fax: [phone]  Loc: [phone]    Visit Date: 10/30/2024    Functionality Assessment/Goals Worksheet     On a scale of 0 (Does not Interfere) to 10 (Completely Interferes)     1.  Which number describes how during the past week pain has interfered with       the following:  A.  General Activity:  4  B.  Mood: 0  C.  Walking Ability:  4  D.  Normal Work (Includes both work outside the home and housework):  4  E.  Relations with Other People:   0  F.  Sleep:   0  G.  Enjoyment of Life:   0    2.  Patient Prefers to Take their Pain Medications:     []  On a regular basis   []  Only when necessary    [x]  Does not take pain medications    3.  What are the Patient's Goals/Expectations for Visiting Pain Management?     [x]  Learn about my pain    [x]  Receive Medication   [x]  Physical Therapy     []  Treat Depression   [x]  Receive Injections    []  Treat Sleep   []  Deal with Anxiety and Stress   []  Treat Opoid Dependence/Addiction   []  Other:        HPI:   Gladys Pepper is a 64 y.o. female is here today for    Chief Complaint: Low back pain     HPI   Follow up from bilateral C-facet RFA @ C5-6 and C6-7 completed by Dr. Beltrán on 10/9/2024. Gladys reports that neck pain is very tolerable and she is receiving over 90% relief of neck pain from this procedure. States neck pain is not stiff or tense and she is not having throbbing pain and is not having headaches since this procedure. States that she is more flexible and has better ROM and is tolerating activity.  Denies any pain in her neck at this time states more intermittent aching pain now.     Now patients main pain compliant is pain in low back pain is in center and is across and has pain into SI area. She feels that this pain has increased slowly over the past couple months as she

## 2024-11-15 NOTE — H&P
H&P    Patient with complaints of increased pain in low back taht has been increasing the last 1-2 months as previous relief from L-facet RFA is waning. States pain is increasing with time. Pain is in low back - described as \"raw, throbbing, aching\" increased with walking and activity. \"I have been limited this month. Pain intermittently in buttocks and down down posterior legs- throbbing and tingling. L-facet RFA helped this as well. Pain is most bothersome in low back. Neck pain remains tolerable from C-facet RFA   Recently received prednisone from pcp d/t flare-up of pain last week   Continues Robaxin for muscle spasms   Pain increases with bending, lifting, twisting , walking, standing, sitting, getting up and down, and housework or working at job, weather changes            Medications reviewed. Patient denies side effects with medications. Patient states she is taking medications as prescribed. Shedenies receiving pain medications from other sources. She denies any ER visits since last visit. Pain scale with out pain medications or at its worst is 7-8/10. The patientis allergic to nsaids, pcn [penicillins], and medrol [methylprednisolone]. Subjective:      Review of Systems   Constitutional: Negative. Respiratory: Negative. Cardiovascular: Negative. Musculoskeletal:  Positive for arthralgias, back pain, gait problem, myalgias, neck pain and neck stiffness. Negative for joint swelling. No assist devices    Neurological:  Positive for weakness and numbness. Negative for headaches. Psychiatric/Behavioral:  Negative for sleep disturbance. Objective:          Vitals:     09/26/22 0953   BP: 118/64   Weight: 162 lb (73.5 kg)   Height: 5' 6\" (1.676 m)         Physical Exam  Vitals and nursing note reviewed. Constitutional:       General: She is not in acute distress. Appearance: She is well-developed. She is not diaphoretic.    HENT:      Head: Normocephalic and atraumatic. Right Ear: External ear normal.      Left Ear: External ear normal.      Nose: Nose normal.      Mouth/Throat:      Pharynx: No oropharyngeal exudate. Eyes:      General: No scleral icterus. Right eye: No discharge. Left eye: No discharge. Conjunctiva/sclera: Conjunctivae normal.      Pupils: Pupils are equal, round, and reactive to light. Neck:      Thyroid: No thyromegaly. Cardiovascular:      Rate and Rhythm: Normal rate and regular rhythm. Heart sounds: Normal heart sounds. No murmur heard. No friction rub. No gallop. Pulmonary:      Effort: Pulmonary effort is normal. No respiratory distress. Breath sounds: Normal breath sounds. No wheezing or rales. Chest:      Chest wall: No tenderness. Abdominal:      General: Bowel sounds are normal. There is no distension. Palpations: Abdomen is soft. Tenderness: There is no abdominal tenderness. There is no guarding or rebound. Musculoskeletal:         General: Tenderness present. Cervical back: Rigidity, spasms, tenderness and bony tenderness present. No edema or erythema. No muscular tenderness. Decreased range of motion. Thoracic back: Tenderness present. Lumbar back: Tenderness and bony tenderness present. Decreased range of motion. Positive right straight leg raise test. Negative left straight leg raise test.        Back:  Skin:     General: Skin is warm. Coloration: Skin is not pale. Findings: No erythema or rash. Neurological:      Mental Status: She is alert and oriented to person, place, and time. She is not disoriented. Cranial Nerves: No cranial nerve deficit. Sensory: No sensory deficit. Motor: Weakness present. No atrophy or abnormal muscle tone. Coordination: Coordination normal.      Gait: Gait normal.      Deep Tendon Reflexes: Reflexes are normal and symmetric. Babinski sign absent on the right side.  Babinski sign absent on the left side. Psychiatric:         Attention and Perception: Attention normal. She is attentive. Mood and Affect: Mood normal. Mood is not anxious or depressed. Affect is not labile, blunt, angry or inappropriate. Speech: Speech normal. She is communicative. Speech is not rapid and pressured, delayed, slurred or tangential.         Behavior: Behavior normal. Behavior is not agitated, slowed, aggressive, withdrawn, hyperactive or combative. Behavior is cooperative. Thought Content: Thought content normal. Thought content is not paranoid or delusional. Thought content does not include homicidal or suicidal ideation. Thought content does not include homicidal or suicidal plan. Cognition and Memory: Cognition normal. Memory is not impaired. She does not exhibit impaired recent memory or impaired remote memory. Judgment: Judgment normal. Judgment is not impulsive or inappropriate. BETHANY  Patricks test  positive  Yeoman's  or Gaenslen's positive     Assessment:      1. Lumbar spondylosis    2. Neuroforaminal stenosis of lumbar spine    3. Lumbosacral radiculitis    4. Spondylosis of cervical region without myelopathy or radiculopathy    5. Neck pain    6. Chronic pain syndrome       Plan:      OARRS reviewed. Current MED: 0  Patient was not offered naloxone for home. Discussed long term side effects of medications, tolerance, dependency and addiction. Previous UDS reviewed  UDS preformed today for compliance. Patient told can not receive any pain medications from any other source. No evidence of abuse, diversion or aberrant behavior. Medications and/or procedures to improve function and quality of life- patient understanding with this and that may not be pain free  Discussed with patient about safe storage of medications at home  Discussed possible weaning of medication dosing dependent on treatment/procedure results.    Discussed with patient about risks with procedure including infection, reaction to medication, increased pain, or bleeding. Procedure notes reviewed in detail   Received over 85% relief of low back pain and leg pain for over a year from previous L-facet RFA. Plan Bilateral L-facet RFA @ L4-5 and L5-S1 for longer term pain relief. Procedure discussed in detail   Needs to hold baby aspirin and Ibuprofen   Neck pain remains tolerable- continues to receive over 85% relief from C-facet RFA            Return for Bilateral L-facet RFA @ L4-5 and L5-S1. , follow up after procedure. Triggering events leading to humiliation, shame, and/or despair (e.g., Loss of relationship, financial or health status) (real or anticipated)/Perceived burden on family or others

## 2024-12-11 ASSESSMENT — ENCOUNTER SYMPTOMS
BACK PAIN: 1
RESPIRATORY NEGATIVE: 1

## 2024-12-11 NOTE — H&P
Today, patient presents for planned bilateral L4/L5 and L5/S1 lumbar radiofrequency ablation    This note is reflective of the patient's previous visit for evaluation. We will proceed with today's planned procedure. Since patient's last visit for evaluation, there have been no interval changes in medical history. Patient has no new numbness, weakness, or focal neurological deficit since evaluation. Patient has no contraindications to injection (no anticoagulation or recent antibiotic intake for active infections), and has a  present or is able to drive themselves (as discussed and cleared by physician).  Allergies to latex, contrast dye, and steroid medications have been confirmed with the patient prior to the procedure.  NPO necessity has been assessed and accepted based on procedure complexity. The risks and benefits of the procedure have been explained including but are not limited to infection, bleeding, paralysis, immediate post procedure weakness, and dizziness; the patient acknowledges understanding and desires to proceed with the procedure. Patient has signed consent for same procedure as discussed in previous clinic encounter. All other questions and concerns were addressed at bedside. See procedure note for full details.       Post procedure Instructions: The patient was advised not to drive during the day of the procedure and not to engage in any significant decision making (unless otherwise states by physician). The patient was also advised to be cautious with walking/activity for 24 hours following today's visit and asked not to engage in over-exertion (unless otherwise states by physician). After this time, it is ok to resume pre-procedure level of activity. Patient advised to apply ice to site of injection in situations of pain and discomfort. Patient advised to not submerge site of injection during bath or pool activities for approximately 24 hours post-procedure. Patient attested to

## 2024-12-12 ENCOUNTER — HOSPITAL ENCOUNTER (OUTPATIENT)
Age: 64
Setting detail: OUTPATIENT SURGERY
Discharge: HOME | End: 2024-12-12
Attending: ANESTHESIOLOGY | Admitting: ANESTHESIOLOGY
Payer: MEDICARE

## 2024-12-12 ENCOUNTER — APPOINTMENT (OUTPATIENT)
Dept: GENERAL RADIOLOGY | Age: 64
End: 2024-12-12
Attending: ANESTHESIOLOGY
Payer: MEDICARE

## 2024-12-12 VITALS
SYSTOLIC BLOOD PRESSURE: 125 MMHG | RESPIRATION RATE: 16 BRPM | BODY MASS INDEX: 26.81 KG/M2 | HEART RATE: 62 BPM | HEIGHT: 66 IN | TEMPERATURE: 96 F | OXYGEN SATURATION: 94 % | DIASTOLIC BLOOD PRESSURE: 77 MMHG | WEIGHT: 166.8 LBS

## 2024-12-12 PROCEDURE — 7100000010 HC PHASE II RECOVERY - FIRST 15 MIN: Performed by: ANESTHESIOLOGY

## 2024-12-12 PROCEDURE — 64635 DESTROY LUMB/SAC FACET JNT: CPT | Performed by: ANESTHESIOLOGY

## 2024-12-12 PROCEDURE — 7100000011 HC PHASE II RECOVERY - ADDTL 15 MIN: Performed by: ANESTHESIOLOGY

## 2024-12-12 PROCEDURE — 3600000056 HC PAIN LEVEL 4 BASE: Performed by: ANESTHESIOLOGY

## 2024-12-12 PROCEDURE — 2709999900 HC NON-CHARGEABLE SUPPLY: Performed by: ANESTHESIOLOGY

## 2024-12-12 PROCEDURE — 3600000057 HC PAIN LEVEL 4 ADDL 15 MIN: Performed by: ANESTHESIOLOGY

## 2024-12-12 PROCEDURE — 99152 MOD SED SAME PHYS/QHP 5/>YRS: CPT | Performed by: ANESTHESIOLOGY

## 2024-12-12 PROCEDURE — 6360000002 HC RX W HCPCS: Performed by: ANESTHESIOLOGY

## 2024-12-12 PROCEDURE — 64636 DESTROY L/S FACET JNT ADDL: CPT | Performed by: ANESTHESIOLOGY

## 2024-12-12 RX ORDER — LIDOCAINE HYDROCHLORIDE 20 MG/ML
INJECTION, SOLUTION EPIDURAL; INFILTRATION; INTRACAUDAL; PERINEURAL PRN
Status: DISCONTINUED | OUTPATIENT
Start: 2024-12-12 | End: 2024-12-12 | Stop reason: ALTCHOICE

## 2024-12-12 RX ORDER — LIDOCAINE HYDROCHLORIDE 10 MG/ML
INJECTION, SOLUTION EPIDURAL; INFILTRATION; INTRACAUDAL; PERINEURAL PRN
Status: DISCONTINUED | OUTPATIENT
Start: 2024-12-12 | End: 2024-12-12 | Stop reason: ALTCHOICE

## 2024-12-12 RX ORDER — MIDAZOLAM HYDROCHLORIDE 1 MG/ML
INJECTION, SOLUTION INTRAMUSCULAR; INTRAVENOUS PRN
Status: DISCONTINUED | OUTPATIENT
Start: 2024-12-12 | End: 2024-12-12 | Stop reason: ALTCHOICE

## 2024-12-12 RX ORDER — FENTANYL CITRATE 50 UG/ML
INJECTION, SOLUTION INTRAMUSCULAR; INTRAVENOUS PRN
Status: DISCONTINUED | OUTPATIENT
Start: 2024-12-12 | End: 2024-12-12 | Stop reason: ALTCHOICE

## 2024-12-12 ASSESSMENT — PAIN - FUNCTIONAL ASSESSMENT: PAIN_FUNCTIONAL_ASSESSMENT: NONE - DENIES PAIN

## 2024-12-12 ASSESSMENT — PAIN DESCRIPTION - LOCATION: LOCATION: BACK

## 2024-12-12 ASSESSMENT — PAIN SCALES - GENERAL: PAINLEVEL_OUTOF10: 8

## 2024-12-12 ASSESSMENT — PAIN DESCRIPTION - ORIENTATION: ORIENTATION: LOWER

## 2024-12-12 NOTE — POST SEDATION
[facility]  Sedation/Analgesia Post Sedation Record    Pt Name: Gladys Pepper  MRN: [id number]  YOB: 1960  Procedure Performed By: Demario Steele DO  Primary Care Physician: Addie Izaguirre APRN - CNP    POST-PROCEDURE    Physicians/Assistants: Demario Steele DO  Procedure Performed: See Procedure Note   Sedation/Anesthesia: Versed and Fentanyl (See procedure note for amount and duration)  Estimated Blood Loss:     0  ml  Specimens Removed: None        Complications: None           Demario Steele DO  Electronically signed 12/12/2024 at 2:27 PM

## 2024-12-12 NOTE — PROGRESS NOTES
1122 Patient arrives to recovery room via cart.  Spontaneous respirations, vss, report received from surgical RN.  Patient denies pain, numbness, tingling , nausea.  Injection site clean, dry, intact. HOB elevated. IV capped. Drink given.  Call light within reach.    1135 IV discontinued. Up to dress self  1145 Discharge to home in stable ambulatory condition with friend

## 2024-12-12 NOTE — PROCEDURES
Pre-operative Diagnosis: Lumbar facet pain     Post-operative Diagnosis: Lumbar facet pain     Procedure: Bilateral lumbar thermal radiofrequency ablation targeting facet joints L4/L5 and L5/S1    Procedure Description:  After consent was obtained, the patient was placed in the prone position with a pillow under the abdomen to reduce the lordotic curve of the lumbar spine. The lower back was prepped with chloraprep and draped in a sterile fashion.  Then, 0.5 cc of 1 % lidocaine was used for local anesthesia of the skin and superficial subcutaneous tissues.  Three 20-gauge 100mm SMK cannulas with 10-mm active tips were advanced under fluoroscopic guidance in an AP view to the junction of the superior articular process and the transverse process of the L4 and L5 vertebra and at the sacral ala. There were no paresthesias, heme or CSF obtained.  Needle placement was confirmed using AP and oblique views. This procedure was repeated on the contralateral side.      Sensory and motor stimulation at 50Hz and 2Hz and impedance measurements were carried out having reached threshold at:     RIGHT  L4: 0.2V/3V/150-300 Ohms  L5: 0.2V/3V/150-300 Ohms  SA: 0.2V/3V/150-300 Ohms     LEFT  L4: 0.2V/3V/150-300 Ohms  L5: 0.2V/3V/150-300 Ohms  SA: 0.2V/3V/150-300 Ohms        Then, 1cc of 2% Lidocaine was injected at the site. Temperature was then raised to 80 degrees centigrade for 90 seconds with a 15 second temperature ramp. No pain was reported during the lesioning. The needles were then withdrawn without complications. The patient tolerated the procedure well. The patient was transported to the recovery room and discharged in ambulatory fashion.    Procedural Complications: None  Estimated Blood Loss: 0 mL    IV sedation was used during the procedure:  - Moderate intravenous conscious sedation was supervised by Dr. Steele  - The patient was independently monitored by a Registered Nurse assigned to the procedure room  - Monitoring

## 2024-12-12 NOTE — PRE SEDATION
108 (90 Base) MCG/ACT inhaler Indications: Pt states she takes once in a while 1/21/20   Queta Grimm MD   VITAMIN D, CHOLECALCIFEROL, PO Take 1 tablet by mouth daily    Provider, MD Queta     PHYSICAL:   Vitals:    12/12/24 1122   BP: 125/77   Pulse: 62   Resp: 16   Temp: (!) 96 °F (35.6 °C)   SpO2: 94%     PLANNED PROCEDURE   See procedure note  SEDATION  Planned agent: Versed and Fentanyl  ASA Classification: 2  Class 1: A normal healthy patient  Class 2: Pt with mild to moderate systemic disease  Class 3: Severe systemic disease or disturbance  Class 4: Severe systemic disorders that are already life threatening.  Class 5: Moribund pt with little chances of survival, for more than 24 hours.  Mallampati I Airway Classification: 2    1. Pre-procedure diagnostic studies complete and results available.  2. Previous sedation/anesthesia experiences assessed.  3. The patient is an appropriate candidate to undergo the planned procedure sedation and anesthesia. (Refer to nursing sedation/analgesia documentation record)  4. Formulation and discussion of sedation/procedure plan, risks, and expectations with patient and/or responsible adult completed.  5. Patient examined immediately prior to the procedure. (Refer to nursing sedation/analgesia documentation record)    Demario Steele DO  Electronically signed 12/12/2024 at 2:26 PM

## 2025-01-02 ENCOUNTER — OFFICE VISIT (OUTPATIENT)
Dept: PHYSICAL MEDICINE AND REHAB | Age: 65
End: 2025-01-02
Payer: MEDICARE

## 2025-01-02 VITALS
SYSTOLIC BLOOD PRESSURE: 128 MMHG | HEIGHT: 66 IN | WEIGHT: 166.89 LBS | BODY MASS INDEX: 26.82 KG/M2 | DIASTOLIC BLOOD PRESSURE: 70 MMHG

## 2025-01-02 DIAGNOSIS — M17.12 PRIMARY OSTEOARTHRITIS OF LEFT KNEE: ICD-10-CM

## 2025-01-02 DIAGNOSIS — M47.816 LUMBAR FACET ARTHROPATHY: ICD-10-CM

## 2025-01-02 DIAGNOSIS — M54.2 NECK PAIN: ICD-10-CM

## 2025-01-02 DIAGNOSIS — G89.29 CHRONIC MYOFASCIAL PAIN: Primary | ICD-10-CM

## 2025-01-02 DIAGNOSIS — M54.50 CHRONIC BILATERAL LOW BACK PAIN WITHOUT SCIATICA: ICD-10-CM

## 2025-01-02 DIAGNOSIS — M53.3 SI (SACROILIAC) PAIN: ICD-10-CM

## 2025-01-02 DIAGNOSIS — M25.562 CHRONIC PAIN OF LEFT KNEE: ICD-10-CM

## 2025-01-02 DIAGNOSIS — M47.812 SPONDYLOSIS OF CERVICAL REGION WITHOUT MYELOPATHY OR RADICULOPATHY: ICD-10-CM

## 2025-01-02 DIAGNOSIS — M79.18 CHRONIC MYOFASCIAL PAIN: Primary | ICD-10-CM

## 2025-01-02 DIAGNOSIS — G89.29 CHRONIC BILATERAL LOW BACK PAIN WITHOUT SCIATICA: ICD-10-CM

## 2025-01-02 DIAGNOSIS — G89.29 CHRONIC PAIN OF LEFT KNEE: ICD-10-CM

## 2025-01-02 DIAGNOSIS — G89.4 CHRONIC PAIN SYNDROME: ICD-10-CM

## 2025-01-02 DIAGNOSIS — M47.816 SPONDYLOSIS OF LUMBAR REGION WITHOUT MYELOPATHY OR RADICULOPATHY: ICD-10-CM

## 2025-01-02 PROCEDURE — 99214 OFFICE O/P EST MOD 30 MIN: CPT | Performed by: NURSE PRACTITIONER

## 2025-01-02 PROCEDURE — 1159F MED LIST DOCD IN RCRD: CPT | Performed by: NURSE PRACTITIONER

## 2025-01-02 PROCEDURE — 1123F ACP DISCUSS/DSCN MKR DOCD: CPT | Performed by: NURSE PRACTITIONER

## 2025-01-02 PROCEDURE — 1125F AMNT PAIN NOTED PAIN PRSNT: CPT | Performed by: NURSE PRACTITIONER

## 2025-01-02 RX ORDER — PREDNISONE 2.5 MG/1
2.5 TABLET ORAL DAILY
Qty: 10 TABLET | Refills: 0 | Status: SHIPPED | OUTPATIENT
Start: 2025-01-02

## 2025-01-02 ASSESSMENT — ENCOUNTER SYMPTOMS
BACK PAIN: 1
RESPIRATORY NEGATIVE: 1

## 2025-01-02 NOTE — PROGRESS NOTES
Judgment normal. Judgment is not impulsive or inappropriate.       BETHANY  Patricks test  positive  Yeoman's  or Gaenslen's positive       Assessment:     1. Chronic myofascial pain    2. Spondylosis of lumbar region without myelopathy or radiculopathy    3. Lumbar facet arthropathy    4. Chronic bilateral low back pain without sciatica    5. SI (sacroiliac) pain    6. Spondylosis of cervical region without myelopathy or radiculopathy    7. Neck pain    8. Primary osteoarthritis of left knee    9. Chronic pain of left knee    10. Chronic pain syndrome         Assessment & Plan   Plan:      OARRS reviewed. Current MED: 0  Patient was not offered naloxone for home.   Discussed long term side effects of medications, tolerance, dependency and addiction.  Previous UDS reviewed  UDS preformed today for compliance.  Patient told can not receive any pain medications from any other source.  No evidence of abuse, diversion or aberrant behavior.  Medications and/or procedures to improve function and quality of life- patient understanding with this and that may not be pain free  Discussed with patient about safe storage of medications at home  Discussed possible weaning of medication dosing dependent on treatment/procedure results.   Discussed with patient about risks with procedure including infection, reaction to medication, increased pain, or bleeding.  Procedure notes reviewed in detail   Is receiving over 90% relief of low back pain from bilateral L-facet rFA, continue sto receive over 80% relief of neck pain from bilateral C-facet RFA  Ordered 10 tabs of Prednisone 2.5 mg daily d/t increased flare up of muscle pain and left knee pain   Discussed repeating left knee injection when needing to.    Continue tylenol prn and Baclofen from pcp. Patient also uses marijuana   Will follow up in 2 months or sooner if needed     Meds. Prescribed:   Orders Placed This Encounter   Medications    predniSONE (DELTASONE) 2.5 MG tablet

## 2025-01-16 NOTE — OP NOTE
[facility]  [facility]  [address] [address]  Dept: [phone]  Dept Fax: [phone]    Travis Howard is a 78 y.o. male who presents today for his medicalconditions/complaints as noted below.  Travis Howard is c/o of Hypertension      HPI:     78 year-old male patient presents with encounter to follow up.     Significant cardiac history of palpitations, hypertension and mitral valve prolapse.  Previously followed with cardiology Dr. Rodarte and Dr. Gao.   Current medication management includes propranolol 40 mg tid. BP and HR stable. -resumed previously prescribed carvedilol and felt resolution of palpitations.      Hyperlipidemia, managed crestor 10, lipids and liver stable. Mild carotid stenosis repeat carotids in August 2024 stable,  - recent lipid levels stable     History of elevated PSA follows with urology has had MRI and biopsy previously, recently started on alfuzosin. Seeing Dr. Zimmer, recent psa completed, downtrending, repeat in 1 year     History of insomnia takes Elavil 75 mg nightly, use melatonin  / valerian as needed     History of keratosis to the plantar soles of the feet.  Uses topicals as needed.     History of lumbar spondylosis follows with neurosurgery, dealing with sciatica inermittently.     Chronic sinus noted on ct, referred to ENT     Taking magnesium, vit d, vit b12 supplements - last mag stable, due for b12 and vit d recently normal    Sublcinical hypothyroidism, last tsh slight elevation, normal t4, negative thyroid antibodies          Past Medical History:   Diagnosis Date    BPH (benign prostatic hyperplasia)     Caffeine use     1 coffee and 1 cola/ day    Cataracts, bilateral     Elevated PSA     Hyperglycemia 3/7/2014    Hypertension     Insomnia     Irregular heart beat     Mitral valve prolapse         Current Outpatient Medications   Medication Sig Dispense Refill    propranolol (INDERAL) 40 MG tablet  Pre-Procedure Note    Patient Name: Santos Salcedo   YOB: 1960  Medical Record Number: [id number]  Date: 12/21/21    Indication:  Neck pain    Consent: On file. Vital Signs:   Vitals:    12/21/21 0826   BP: (!) 147/78   Pulse: 71   Resp: 16   Temp: 96.2 °F (35.7 °C)   SpO2: 96%       Past Medical History:   has a past medical history of Anxiety, Chronic back pain, COPD (chronic obstructive pulmonary disease) ([facility] [address] Depression, Disc disorder, Emphysema of lung ([facility] [address] Fibromyalgia, Hyperlipidemia, Hypertension, Osteoarthritis, and Osteoporosis. Past Surgical History:   has a past surgical history that includes Tonsillectomy (1972); Knee arthroscopy (Bilateral, 2019); Facet joint injection (Bilateral, 5/14/2020); Facet joint injection (Bilateral, 6/17/2020); Pain management procedure (Bilateral, 7/22/2020); Pain management procedure (Right, 10/7/2020); Pain management procedure (Left, 11/4/2020); Facet joint injection (Bilateral, 1/20/2021); Pain management procedure (Left, 6/8/2021); and Pain management procedure (Right, 7/27/2021). Pre-Sedation Documentation and Exam:   Vital signs have been reviewed (see flow sheet for vitals). Sedation/ Anesthesia Plan:   MAC      Patient is an appropriate candidate for plan of sedation: yes      Preoperative Diagnosis: C-spondylosis    Post-Op Dx: as above    Procedure Performed : Diagnostic / Confirmatory Median Branch Blocks at the levels of C5-6 and C6-7 bilateral under fluoroscopic guidance  # 2. Indication for the Procedure:  Patient had history of chronic neck pain that is not responding well to the conservative treatment. Patient's pain is mostly axial in nature. Pain is interfering with the activities of daily living. Examination revealed facet tenderness and facet loading is positive. Hence we decided to do confirmatory median branch blocks for possible radiofrequency abalation of median branches for long term pain releif.    The procedure and risks  were discussed with the patient and an informed consent was obtained. Procedure:  Bilateral  Patient is placed in prone position and skin over the back was prepped and draped in sterile manner. Then using fluoroscopy the waist of the facet joint complex of the vertebra was observed and the view  was optimized. The skin and deep tissues over the area were infiltrated with 18 ml of 1% lidocaine. Then a #22-gauge 3-1/2   inch spinal needle was introduced through the skin wheal under fluoroscopy guidance such that the tip of the needle lies at the junction of the transverse process with the superior processes of the facet joint. Then after negative aspiration about 0.5 cc of Omnipaque 180 was injected through the needle and spread of the contrast along the waist of facet joint complex was observed. Then after negative aspiration a total of 4 ml of 0.25% Marcaine and Dexamethasone 12 mg was injected through the needle in divided doses. EBL-0  Patient's vital signs and neurological status remained stable through  the procedure and the post procedural  period. The patient was instructed to keep track of pain for next 24 hours every hour and bring it to the next visit. Patient tolerated the procedure well and was discharged home in stable condition.     Electronically signed by Wan Chakraborty MD on 12/21/21 at 9:29 AM EST

## 2025-05-14 ENCOUNTER — TELEPHONE (OUTPATIENT)
Dept: PHYSICAL MEDICINE AND REHAB | Age: 65
End: 2025-05-14

## 2025-05-14 ENCOUNTER — OFFICE VISIT (OUTPATIENT)
Dept: PHYSICAL MEDICINE AND REHAB | Age: 65
End: 2025-05-14
Payer: MEDICARE

## 2025-05-14 VITALS
SYSTOLIC BLOOD PRESSURE: 112 MMHG | HEIGHT: 66 IN | DIASTOLIC BLOOD PRESSURE: 72 MMHG | WEIGHT: 166.89 LBS | BODY MASS INDEX: 26.82 KG/M2

## 2025-05-14 DIAGNOSIS — M25.562 CHRONIC PAIN OF LEFT KNEE: ICD-10-CM

## 2025-05-14 DIAGNOSIS — G89.29 CHRONIC BILATERAL LOW BACK PAIN WITHOUT SCIATICA: ICD-10-CM

## 2025-05-14 DIAGNOSIS — M47.812 SPONDYLOSIS OF CERVICAL REGION WITHOUT MYELOPATHY OR RADICULOPATHY: Primary | ICD-10-CM

## 2025-05-14 DIAGNOSIS — M79.18 CHRONIC MYOFASCIAL PAIN: ICD-10-CM

## 2025-05-14 DIAGNOSIS — M47.816 LUMBAR FACET ARTHROPATHY: ICD-10-CM

## 2025-05-14 DIAGNOSIS — G89.29 CHRONIC MYOFASCIAL PAIN: ICD-10-CM

## 2025-05-14 DIAGNOSIS — M54.2 NECK PAIN: ICD-10-CM

## 2025-05-14 DIAGNOSIS — G89.4 CHRONIC PAIN SYNDROME: ICD-10-CM

## 2025-05-14 DIAGNOSIS — M53.3 SI (SACROILIAC) PAIN: ICD-10-CM

## 2025-05-14 DIAGNOSIS — G89.29 CHRONIC PAIN OF LEFT KNEE: ICD-10-CM

## 2025-05-14 DIAGNOSIS — M54.50 CHRONIC BILATERAL LOW BACK PAIN WITHOUT SCIATICA: ICD-10-CM

## 2025-05-14 DIAGNOSIS — M17.12 PRIMARY OSTEOARTHRITIS OF LEFT KNEE: ICD-10-CM

## 2025-05-14 DIAGNOSIS — M47.816 SPONDYLOSIS OF LUMBAR REGION WITHOUT MYELOPATHY OR RADICULOPATHY: ICD-10-CM

## 2025-05-14 PROCEDURE — 99214 OFFICE O/P EST MOD 30 MIN: CPT | Performed by: NURSE PRACTITIONER

## 2025-05-14 PROCEDURE — 1159F MED LIST DOCD IN RCRD: CPT | Performed by: NURSE PRACTITIONER

## 2025-05-14 PROCEDURE — 1123F ACP DISCUSS/DSCN MKR DOCD: CPT | Performed by: NURSE PRACTITIONER

## 2025-05-14 PROCEDURE — 1125F AMNT PAIN NOTED PAIN PRSNT: CPT | Performed by: NURSE PRACTITIONER

## 2025-05-14 RX ORDER — METHOCARBAMOL 750 MG/1
750 TABLET, FILM COATED ORAL 2 TIMES DAILY
COMMUNITY

## 2025-05-14 ASSESSMENT — ENCOUNTER SYMPTOMS
COUGH: 1
BACK PAIN: 1
WHEEZING: 0
GASTROINTESTINAL NEGATIVE: 1
SHORTNESS OF BREATH: 0

## 2025-05-14 NOTE — PROGRESS NOTES
Functionality Assessment/Goals Worksheet     On a scale of 0 (Does not Interfere) to 10 (Completely Interferes)     1.  Which number describes how during the past week pain has interfered with           the following:  A.  General Activity:  10  B.  Mood: 6  C.  Walking Ability:  10  D.  Normal Work (Includes both work outside the home and housework):  10  E.  Relations with Other People:   6  F.  Sleep:   10  G.  Enjoyment of Life:   10    2.  Patient Prefers to Take their Pain Medications:     []  On a regular basis   [x]  Only when necessary    [x]  Does not take pain medications    3.  What are the Patient's Goals/Expectations for Visiting Pain Management?     [x]  Learn about my pain    [x]  Receive Medication   [x]  Physical Therapy     []  Treat Depression   [x]  Receive Injections    []  Treat Sleep   []  Deal with Anxiety and Stress   []  Treat Opoid Dependence/Addiction   [x]  Other:       HPI:   Gladys Pepper is a 64 y.o. female is here today for    Chief Complaint: Neck pain, low back pain , left knee pain     HPI   Has been 4.5 month since last follow up. She cancel 2 appointments in March as she states she did not have the money to pay her copay.     Gladys has a main compliant of pain in her neck that has slowly increasing over the past 2 months. Pain is in her posterior neck bilaterally that will radiate across her shoulder blades and up her mid neck- constant throbbing, aching pain with period of sharp pains. She will get headaches now up in the back of her head pressure. She states sleep has been more disrupted and housework and activity has been more difficult. She feels effects from previosu C-facet RFA has worn off and would like it repeated.     She continues to have pain in her low back starts in the center and spreads across axially- aching stiff and throbbing at times. She continues to receive relief from L-facet rFA but feels it is slowly wearing off.     She denies any radicular pain

## 2025-06-12 NOTE — PROGRESS NOTES
Called Dr Camacho office for order for EKG or copy of EKG that they have.  Had to leave message  Also left message for Dr Gail alvarez

## 2025-06-13 ENCOUNTER — HOSPITAL ENCOUNTER (OUTPATIENT)
Dept: GENERAL RADIOLOGY | Age: 65
Discharge: HOME | End: 2025-06-13
Payer: MEDICARE

## 2025-06-13 ENCOUNTER — HOSPITAL ENCOUNTER (OUTPATIENT)
Dept: PREADMISSION TESTING | Age: 65
Discharge: HOME | End: 2025-06-17
Payer: MEDICARE

## 2025-06-13 VITALS
WEIGHT: 160.94 LBS | HEART RATE: 69 BPM | DIASTOLIC BLOOD PRESSURE: 93 MMHG | OXYGEN SATURATION: 100 % | SYSTOLIC BLOOD PRESSURE: 133 MMHG | HEIGHT: 67 IN | BODY MASS INDEX: 25.26 KG/M2 | TEMPERATURE: 97 F | RESPIRATION RATE: 16 BRPM

## 2025-06-13 DIAGNOSIS — Z01.818 PREOP EXAMINATION: ICD-10-CM

## 2025-06-13 LAB
ANION GAP SERPL CALC-SCNC: 12 MEQ/L (ref 8–16)
BASOPHILS ABSOLUTE: 0.1 THOU/MM3 (ref 0–0.1)
BASOPHILS NFR BLD AUTO: 0.6 %
BILIRUB UR QL STRIP.AUTO: NEGATIVE
BUN SERPL-MCNC: 20 MG/DL (ref 8–23)
CALCIUM SERPL-MCNC: 9.3 MG/DL (ref 8.8–10.2)
CHARACTER UR: CLEAR
CHLORIDE SERPL-SCNC: 100 MEQ/L (ref 98–111)
CO2 SERPL-SCNC: 23 MEQ/L (ref 22–29)
COLOR, UA: YELLOW
CREAT SERPL-MCNC: 0.9 MG/DL (ref 0.5–0.9)
DEPRECATED RDW RBC AUTO: 50.6 FL (ref 35–45)
EOSINOPHIL NFR BLD AUTO: 0.9 %
EOSINOPHILS ABSOLUTE: 0.1 THOU/MM3 (ref 0–0.4)
ERYTHROCYTE [DISTWIDTH] IN BLOOD BY AUTOMATED COUNT: 13.7 % (ref 11.5–14.5)
GFR SERPL CREATININE-BSD FRML MDRD: 70 ML/MIN/1.73M2
GLUCOSE SERPL-MCNC: 121 MG/DL (ref 74–109)
GLUCOSE UR QL STRIP.AUTO: NEGATIVE MG/DL
HCT VFR BLD AUTO: 39 % (ref 37–47)
HGB BLD-MCNC: 12.9 GM/DL (ref 12–16)
HGB UR QL STRIP.AUTO: NEGATIVE
IMM GRANULOCYTES # BLD AUTO: 0.04 THOU/MM3 (ref 0–0.07)
IMM GRANULOCYTES NFR BLD AUTO: 0.5 %
KETONES UR QL STRIP.AUTO: NEGATIVE
LYMPHOCYTES ABSOLUTE: 2.1 THOU/MM3 (ref 1–4.8)
LYMPHOCYTES NFR BLD AUTO: 24.2 %
MCH RBC QN AUTO: 32.7 PG (ref 26–33)
MCHC RBC AUTO-ENTMCNC: 33.1 GM/DL (ref 32.2–35.5)
MCV RBC AUTO: 98.7 FL (ref 81–99)
MONOCYTES ABSOLUTE: 0.7 THOU/MM3 (ref 0.4–1.3)
MONOCYTES NFR BLD AUTO: 7.9 %
NEUTROPHILS ABSOLUTE: 5.7 THOU/MM3 (ref 1.8–7.7)
NEUTROPHILS NFR BLD AUTO: 65.9 %
NITRITE UR QL STRIP: NEGATIVE
NRBC BLD AUTO-RTO: 0 /100 WBC
PH UR STRIP.AUTO: 5.5 [PH] (ref 5–9)
PLATELET # BLD AUTO: 276 THOU/MM3 (ref 130–400)
PMV BLD AUTO: 9.8 FL (ref 9.4–12.4)
POTASSIUM SERPL-SCNC: 4.4 MEQ/L (ref 3.5–5.2)
PROT UR STRIP.AUTO-MCNC: NEGATIVE MG/DL
RBC # BLD AUTO: 3.95 MILL/MM3 (ref 4.2–5.4)
SODIUM SERPL-SCNC: 135 MEQ/L (ref 135–145)
SP GR UR REFRACT.AUTO: 1.01 (ref 1–1.03)
UROBILINOGEN, URINE: 0.2 EU/DL (ref 0–1)
WBC # BLD AUTO: 8.6 THOU/MM3 (ref 4.8–10.8)
WBC #/AREA URNS HPF: NEGATIVE /[HPF]

## 2025-06-13 PROCEDURE — 71046 X-RAY EXAM CHEST 2 VIEWS: CPT

## 2025-06-13 PROCEDURE — 36415 COLL VENOUS BLD VENIPUNCTURE: CPT

## 2025-06-13 PROCEDURE — 87081 CULTURE SCREEN ONLY: CPT

## 2025-06-13 PROCEDURE — 85025 COMPLETE CBC W/AUTO DIFF WBC: CPT

## 2025-06-13 PROCEDURE — 81003 URINALYSIS AUTO W/O SCOPE: CPT

## 2025-06-13 PROCEDURE — 80048 BASIC METABOLIC PNL TOTAL CA: CPT

## 2025-06-13 RX ORDER — ESCITALOPRAM OXALATE 20 MG/1
20 TABLET ORAL NIGHTLY
COMMUNITY
Start: 2025-05-13

## 2025-06-13 RX ORDER — MULTIVITAMIN WITH IRON
1 TABLET ORAL DAILY
COMMUNITY

## 2025-06-13 RX ORDER — ROSUVASTATIN CALCIUM 5 MG/1
5 TABLET, COATED ORAL NIGHTLY
COMMUNITY
Start: 2025-05-26

## 2025-06-13 RX ORDER — ESCITALOPRAM OXALATE 5 MG/1
5 TABLET ORAL DAILY PRN
COMMUNITY

## 2025-06-13 RX ORDER — TRAZODONE HYDROCHLORIDE 50 MG/1
50 TABLET ORAL NIGHTLY
COMMUNITY
Start: 2025-06-10

## 2025-06-13 ASSESSMENT — PAIN DESCRIPTION - INTENSITY
RATING_3: 7
RATING_2: 7

## 2025-06-13 ASSESSMENT — PAIN DESCRIPTION - PAIN TYPE: TYPE: CHRONIC PAIN

## 2025-06-13 ASSESSMENT — PAIN DESCRIPTION - DESCRIPTORS
DESCRIPTORS_3: PRESSURE;SORE
DESCRIPTORS_2: ACHING;SORE;PRESSURE
DESCRIPTORS: ACHING

## 2025-06-13 ASSESSMENT — KOOS JR
RISING FROM SITTING: EXTREME
HOW SEVERE IS YOUR KNEE STIFFNESS AFTER FIRST WAKING IN MORNING: MILD
GOING UP OR DOWN STAIRS: EXTREME
TWISING OR PIVOTING ON KNEE: SEVERE
KOOS JR TOTAL INTERVAL SCORE: 34.174
BENDING TO THE FLOOR TO PICK UP OBJECT: EXTREME
STANDING UPRIGHT: SEVERE
STRAIGHTENING KNEE FULLY: MODERATE

## 2025-06-13 ASSESSMENT — PAIN DESCRIPTION - ONSET: ONSET: PROGRESSIVE

## 2025-06-13 ASSESSMENT — PAIN DESCRIPTION - FREQUENCY: FREQUENCY: CONTINUOUS

## 2025-06-13 ASSESSMENT — PAIN DESCRIPTION - LOCATION
LOCATION: KNEE
LOCATION_2: BACK
LOCATION_3: NECK

## 2025-06-13 ASSESSMENT — PAIN DESCRIPTION - ORIENTATION
ORIENTATION: LEFT
ORIENTATION_3: POSTERIOR
ORIENTATION_2: LOWER

## 2025-06-13 ASSESSMENT — PAIN - FUNCTIONAL ASSESSMENT
PAIN_FUNCTIONAL_ASSESSMENT: PREVENTS OR INTERFERES WITH ALL ACTIVE AND SOME PASSIVE ACTIVITIES
PAIN_FUNCTIONAL_ASSESSMENT_SITE2: PREVENTS OR INTERFERES WITH ALL ACTIVE AND SOME PASSIVE ACTIVITIES

## 2025-06-13 ASSESSMENT — PAIN SCALES - GENERAL: PAINLEVEL_OUTOF10: 7

## 2025-06-13 NOTE — PROGRESS NOTES
Preliminary Discharge Planning Questionnaire  Date of Surgery 7/8/25   Surgeon Doug      Having the proper help and care after surgery is very important to your recovery. Who will be able to help you at home when you are discharged from the hospital?    Abril Jamil   Name(s) Son    How many steps to enter your home?  0 Ramp    Bathroom on first floor?  Yes    Bedroom on the first floor?  Yes    Do you have an elevated toilet seat to use at home?  Yes    Do you have a walker to use at home?    Total Joints - with wheels No   Spine - with wheels  N/A     Have you been doing home exercises?Yes    *You will go home with some outpatient physical therapy, where do you prefer to go? [facility]    * What home health agency would you like to use?No Preference      List of all Home Health Agencies Available given to patient, with website ( per Social Work Team)      Please have 2 preferences when you come for surgery- 1st and 2nd choice                                                               *Cardiac Rehab plans NA

## 2025-06-13 NOTE — PROGRESS NOTES
Joint Replacement Pre-op Instruction Sheet    Nothing to eat or drink after midnight the night before surgery, except sips of water to take medications.  Bring photo ID and any health insurance cards.  Wear comfortable clean loose-fitting clothing.  Do not wear any jewelry, make up, contact lenses , piercings,or nail polish on toes. Do not glue in dentures  Bring your walker with 2 wheels if you have one.  Bring your CPAP machine if you have one.  Wear clean clothes to bed and place fresh clean sheets and pillowcases on your bed the night before surgery  Your expected length of stay in the hospital after your surgery is 1-2 days  You will be discharged with outpatient physical therapy and possibly home health care  Shower:  Shower the night before and morning of surgery using the Ready,Set,Prep Shower kit provided (follow the instructions provided with the kit)   Do not shave the surgical area! If needed, your nurse will use clippers to remove any excess hair at the surgical site when you come in for surgery. Do not use a razor on the site of your surgery for at least 2 days prior to surgery because shaving can leave tiny nicks in the skin which may allow germs to enter and cause infection.  Educational handouts on Preventing Surgical Site infections, General Anesthesia, Coughing and Deep Breathing/ Incentive Spirometer as needed, Fall Prevention, MRSA/MSSA, Hand Hygiene and Jewelry Precautions Given  Joint Handbook and Exercise Booklet given and reviewed with patient  Perform the exercises given in your booklet 3 times daily starting today  Physical Therapy Video; [email or web address]    If you have any questions about your preoperative preparations, please call the [facility] Pre-Admission Testing department at [phone] M-F 7:30-4          Updated 1/7/25

## 2025-06-13 NOTE — PROGRESS NOTES
Pain; Gladys has chronic left knee pain rated 7-8/10 today, achy, sore, and \"locks\", weakness.   Gladys has chronic low back pain radiating to bilateral hips and thighs-rated 7/10 she gets, pain injections by Dr. Beltrán as least once a year.   Gladys also has chronic neck pain radiating to bilateral arms rated 7/10 that she receives pain management ablation/injection about once a year.     Pain scale and pain management reviewed with patient and understanding verbalized.

## 2025-06-14 LAB — MRSA SPEC QL CULT: NORMAL

## 2025-06-16 ENCOUNTER — HOSPITAL ENCOUNTER (OUTPATIENT)
Age: 65
Setting detail: OUTPATIENT SURGERY
Discharge: HOME | End: 2025-06-16
Attending: PAIN MEDICINE | Admitting: PAIN MEDICINE
Payer: MEDICARE

## 2025-06-16 ENCOUNTER — APPOINTMENT (OUTPATIENT)
Dept: GENERAL RADIOLOGY | Age: 65
End: 2025-06-16
Attending: PAIN MEDICINE
Payer: MEDICARE

## 2025-06-16 VITALS
BODY MASS INDEX: 25.88 KG/M2 | HEART RATE: 61 BPM | DIASTOLIC BLOOD PRESSURE: 68 MMHG | OXYGEN SATURATION: 95 % | TEMPERATURE: 98 F | SYSTOLIC BLOOD PRESSURE: 129 MMHG | RESPIRATION RATE: 16 BRPM | HEIGHT: 66 IN | WEIGHT: 161 LBS

## 2025-06-16 PROCEDURE — 99152 MOD SED SAME PHYS/QHP 5/>YRS: CPT | Performed by: PAIN MEDICINE

## 2025-06-16 PROCEDURE — 64634 DESTROY C/TH FACET JNT ADDL: CPT | Performed by: PAIN MEDICINE

## 2025-06-16 PROCEDURE — 3600000056 HC PAIN LEVEL 4 BASE: Performed by: PAIN MEDICINE

## 2025-06-16 PROCEDURE — 3600000057 HC PAIN LEVEL 4 ADDL 15 MIN: Performed by: PAIN MEDICINE

## 2025-06-16 PROCEDURE — 6360000002 HC RX W HCPCS: Performed by: PAIN MEDICINE

## 2025-06-16 PROCEDURE — 2709999900 HC NON-CHARGEABLE SUPPLY: Performed by: PAIN MEDICINE

## 2025-06-16 PROCEDURE — 64633 DESTROY CERV/THOR FACET JNT: CPT | Performed by: PAIN MEDICINE

## 2025-06-16 PROCEDURE — 7100000011 HC PHASE II RECOVERY - ADDTL 15 MIN: Performed by: PAIN MEDICINE

## 2025-06-16 PROCEDURE — 7100000010 HC PHASE II RECOVERY - FIRST 15 MIN: Performed by: PAIN MEDICINE

## 2025-06-16 RX ORDER — MIDAZOLAM HYDROCHLORIDE 1 MG/ML
INJECTION, SOLUTION INTRAMUSCULAR; INTRAVENOUS PRN
Status: DISCONTINUED | OUTPATIENT
Start: 2025-06-16 | End: 2025-06-16 | Stop reason: ALTCHOICE

## 2025-06-16 RX ORDER — LIDOCAINE HYDROCHLORIDE 20 MG/ML
INJECTION, SOLUTION INFILTRATION; PERINEURAL PRN
Status: DISCONTINUED | OUTPATIENT
Start: 2025-06-16 | End: 2025-06-16 | Stop reason: ALTCHOICE

## 2025-06-16 RX ORDER — FENTANYL CITRATE 50 UG/ML
INJECTION, SOLUTION INTRAMUSCULAR; INTRAVENOUS PRN
Status: DISCONTINUED | OUTPATIENT
Start: 2025-06-16 | End: 2025-06-16 | Stop reason: ALTCHOICE

## 2025-06-16 RX ORDER — BUPIVACAINE HYDROCHLORIDE 5 MG/ML
INJECTION, SOLUTION PERINEURAL PRN
Status: DISCONTINUED | OUTPATIENT
Start: 2025-06-16 | End: 2025-06-16 | Stop reason: ALTCHOICE

## 2025-06-16 ASSESSMENT — PAIN - FUNCTIONAL ASSESSMENT
PAIN_FUNCTIONAL_ASSESSMENT: 0-10
PAIN_FUNCTIONAL_ASSESSMENT: 0-10

## 2025-06-16 ASSESSMENT — PAIN SCALES - GENERAL: PAINLEVEL_OUTOF10: 0

## 2025-06-16 NOTE — POST SEDATION
[facility] [place][note 2]  Sedation/Analgesia Post Sedation Record    Pt Name: Gladys Pepper  MRN: [id number]  YOB: 1960  Procedure Performed By: Mark White MD   Primary Care Physician: Addie Izaguirre APRN - CNP    POST-PROCEDURE    Physicians/Assistants: Mark White MD  Procedure Performed:  Radiofrequency abalation of median branchs at the levels of C5-6 and C6-7 bilateral under fluoroscopic guidance  Sedation/Anesthesia: Versed and Fentanyl (See procedure note for amount and duration)   Estimated Blood Loss:     0  ml  Specimens Removed:  None        Complications: None Immediately appreciated     Post-procedure Diagnosis/Findings:      C-spondylosis         Recommendations:    F/U office           Electronically signed by Mark White MD on 6/16/2025 at 2:57 PM

## 2025-06-16 NOTE — PRE SEDATION
6 hours as needed for Pain    ProviderQueta MD   aspirin 81 MG tablet Take 1 tablet by mouth daily    ProviderQueta MD     PHYSICAL:   Vitals:    06/16/25 1424   BP:    Pulse:    Resp: 20   Temp:    SpO2:      Mental Status: Alert and oriented   Heart:  Regular rate and rhythm   Lungs:  Clear to auscultation  Abdomen: Soft, non tender   PLANNED PROCEDURE    bilateral C-facet RFA @ C5-6, and C6-7 under fluoroscopy. , follow up after procedure.   SEDATION  Planned agent:fentanyl and versed  ASA Classification: 2  Class 1: A normal healthy patient  Class 2: Pt with mild to moderate systemic disease  Class 3: Severe systemic disease or disturbance  Class 4: Severe systemic disorders that are already life threatening.  Class 5: Moribund pt with little chances of survival, for more than 24 hours.  Mallampati I Airway Classification: 2    1. Pre-procedure diagnostic studies complete and results available.   Comment:    2. Previous sedation/anesthesia experiences assessed.   Comment:    3. The patient is an appropriate candidate to undergo the planned procedure sedation and anesthesia. (Refer to nursing sedation/analgesia documentation record)  4. Formulation and discussion of sedation/procedure plan, risks, and expectations with patient and/or responsible adult completed.  5. Patient examined immediately prior to the procedure. (Refer to nursing sedation/analgesia documentation record)    Electronically signed by Mark White MD on 6/16/25 at 2:32 PM EDT

## 2025-06-16 NOTE — H&P
H&P      Patient main compliant of pain in her neck that has slowly increasing over the past 2 months. Pain is in her posterior neck bilaterally that will radiate across her shoulder blades and up her mid neck- constant throbbing, aching pain with period of sharp pains. She will get headaches now up in the back of her head pressure. She states sleep has been more disrupted and housework and activity has been more difficult. She feels effects from previosu C-facet RFA has worn off and would like it repeated.      She continues to have pain in her low back starts in the center and spreads across axially- aching stiff and throbbing at times. She continues to receive relief from L-facet rFA but feels it is slowly wearing off.      She denies any radicular pain      She continues to have pain in her left knee and she is getting a referral from her pcpc to Dr. Camacho at [facility]. She wants a knee replacement and she does not want to continue injections.   She got a steroid injection in her butt yesterday from her pcp due to pain which is helping      Feels that rainy weather is aggravating pain   Pain increases with bending, lifting, twisting , turning head, walking, standing, getting up and down, and housework or working at job, rainy damp weather      She stopped baclofen as it was not helping, and is back on Robaxin 750 mg BID prn for spasms, taking tylenol prn, continues marijuana use daily which helps pain      Prior Injections:  Bilateral L-facet RFA @ L4-5 and L5-S1 from October 4th 2022 95% relief of low back pain for about 1 full year      Bilateral C-facet RFA @ C5-6, and C6-7 completed 12/20/2022. 85% relief for about a year         left knee genicular nerve block from 3/7/2023     left genicular knee RFA from 5/22/2023 90% relief from over 6 months      left knee genicular RFA completed by Dr. Beltrán on 12/13/2023 70% to 80% relief for 3.5 to 4 months      bilateral L-facet RFA @ L4-5 and L5-S1 completed by

## 2025-06-16 NOTE — PROGRESS NOTES
1500 - Patient arrived to Phase II via bed, report from Pari GARLAND. Patient awake and alert without complaints. VSS, site WNL. Denies any numbness or tingling. Drink and snack provided. Call light in reach.    1518 - Patient sat edge of bed, tolerated well. IV removed. Patient dressed.    1521 - Patient discharged ambulatory to private vehicle with friend.

## 2025-06-16 NOTE — PROCEDURES
injected through the needle in divided doses. Then an  lesion was done at 80 degrees centigrade for 90 seconds.   .          Patient tolerated the procedure well and was discharged home in stable condition.    IV sedation was used during the procedure:  - Moderate intravenous conscious sedation was supervised by Dr. White  - The patient was independently monitored by a Registered Nurse assigned to the procedure room  - Monitoring included automated blood pressure, continuous EKG, and continuous pulse oximetry  - The detailed conscious record is permanently stored in the Hospital Information System  - The following is the conscious sedation record:  Start Time: 1447  End Time : 1459  Duration: 15 minutes   Medications Administered: 2 mg Versed, 100 mcg Fentanyl was given at 1444

## 2025-07-07 ENCOUNTER — OFFICE VISIT (OUTPATIENT)
Dept: PHYSICAL MEDICINE AND REHAB | Age: 65
End: 2025-07-07
Payer: MEDICARE

## 2025-07-07 ENCOUNTER — TELEPHONE (OUTPATIENT)
Dept: PHYSICAL MEDICINE AND REHAB | Age: 65
End: 2025-07-07

## 2025-07-07 VITALS
HEIGHT: 66 IN | BODY MASS INDEX: 25.86 KG/M2 | WEIGHT: 160.94 LBS | DIASTOLIC BLOOD PRESSURE: 68 MMHG | SYSTOLIC BLOOD PRESSURE: 120 MMHG

## 2025-07-07 DIAGNOSIS — M54.50 CHRONIC BILATERAL LOW BACK PAIN WITHOUT SCIATICA: ICD-10-CM

## 2025-07-07 DIAGNOSIS — G89.4 CHRONIC PAIN SYNDROME: ICD-10-CM

## 2025-07-07 DIAGNOSIS — M47.816 LUMBAR FACET ARTHROPATHY: ICD-10-CM

## 2025-07-07 DIAGNOSIS — M53.3 SI (SACROILIAC) PAIN: ICD-10-CM

## 2025-07-07 DIAGNOSIS — G89.29 CHRONIC MYOFASCIAL PAIN: ICD-10-CM

## 2025-07-07 DIAGNOSIS — M47.812 SPONDYLOSIS OF CERVICAL REGION WITHOUT MYELOPATHY OR RADICULOPATHY: ICD-10-CM

## 2025-07-07 DIAGNOSIS — M25.562 CHRONIC PAIN OF LEFT KNEE: ICD-10-CM

## 2025-07-07 DIAGNOSIS — M47.816 SPONDYLOSIS OF LUMBAR REGION WITHOUT MYELOPATHY OR RADICULOPATHY: Primary | ICD-10-CM

## 2025-07-07 DIAGNOSIS — G89.29 CHRONIC BILATERAL LOW BACK PAIN WITHOUT SCIATICA: ICD-10-CM

## 2025-07-07 DIAGNOSIS — M54.2 NECK PAIN: ICD-10-CM

## 2025-07-07 DIAGNOSIS — G89.29 CHRONIC PAIN OF LEFT KNEE: ICD-10-CM

## 2025-07-07 DIAGNOSIS — M17.12 PRIMARY OSTEOARTHRITIS OF LEFT KNEE: ICD-10-CM

## 2025-07-07 DIAGNOSIS — M79.18 CHRONIC MYOFASCIAL PAIN: ICD-10-CM

## 2025-07-07 PROCEDURE — 1125F AMNT PAIN NOTED PAIN PRSNT: CPT | Performed by: NURSE PRACTITIONER

## 2025-07-07 PROCEDURE — 1159F MED LIST DOCD IN RCRD: CPT | Performed by: NURSE PRACTITIONER

## 2025-07-07 PROCEDURE — 1123F ACP DISCUSS/DSCN MKR DOCD: CPT | Performed by: NURSE PRACTITIONER

## 2025-07-07 PROCEDURE — 99214 OFFICE O/P EST MOD 30 MIN: CPT | Performed by: NURSE PRACTITIONER

## 2025-07-07 ASSESSMENT — ENCOUNTER SYMPTOMS
COUGH: 1
WHEEZING: 0
GASTROINTESTINAL NEGATIVE: 1
SHORTNESS OF BREATH: 0
BACK PAIN: 1

## 2025-07-07 NOTE — PROGRESS NOTES
[facility] [place] [facility]  [facility]  [address]  [place] [address]  Dept: [phone]  Dept Fax: [phone]  Loc: [phone]    Visit Date: 7/7/2025    Functionality Assessment/Goals Worksheet     On a scale of 0 (Does not Interfere) to 10 (Completely Interferes)     1.  Which number describes how during the past week pain has interfered with       the following:  A.  General Activity:  7  B.  Mood: 5  C.  Walking Ability:  7  D.  Normal Work (Includes both work outside the home and housework):  7  E.  Relations with Other People:   5  F.  Sleep:   7  G.  Enjoyment of Life:   10    2.  Patient Prefers to Take their Pain Medications:     []  On a regular basis   []  Only when necessary    [x]  Does not take pain medications    3.  What are the Patient's Goals/Expectations for Visiting Pain Management?     [x]  Learn about my pain    [x]  Receive Medication   [x]  Physical Therapy     []  Treat Depression   [x]  Receive Injections    []  Treat Sleep   []  Deal with Anxiety and Stress   []  Treat Opoid Dependence/Addiction   []  Other:        HPI:   Gladys Pepper is a 64 y.o. female is here today for    Chief Complaint: Low back pain, left knee pain     HPI   Follow up from bilateral C-facet RFA @ C5-6 and C6-7 completed by Dr. Beltrán on 6/16/2025. Gladys reports that she is receiving about 90% relief of neck pain from this procedure. States ROM is improved, states pain and tightness is very minimal and she is not having headaches. Denies any neck pain at this time and states it has been more intermittent aching pain     Has complaints of increased pain in her low back which starts in the center and spreads across her low back. She feels that this increased over the past 2 weeks as she feels relief from previous L-facet RFA has worn off     he continues to have pain in her left knee- aching and throbbing pain. She states she

## 2025-08-15 ENCOUNTER — TELEPHONE (OUTPATIENT)
Dept: PHYSICAL MEDICINE AND REHAB | Age: 65
End: 2025-08-15

## 2025-08-18 ENCOUNTER — HOSPITAL ENCOUNTER (OUTPATIENT)
Age: 65
Setting detail: OUTPATIENT SURGERY
Discharge: HOME | End: 2025-08-18
Attending: PAIN MEDICINE | Admitting: PAIN MEDICINE
Payer: MEDICARE

## 2025-08-18 ENCOUNTER — APPOINTMENT (OUTPATIENT)
Dept: GENERAL RADIOLOGY | Age: 65
End: 2025-08-18
Attending: PAIN MEDICINE
Payer: MEDICARE

## 2025-08-18 VITALS
OXYGEN SATURATION: 96 % | TEMPERATURE: 98.5 F | RESPIRATION RATE: 16 BRPM | HEIGHT: 66 IN | DIASTOLIC BLOOD PRESSURE: 83 MMHG | SYSTOLIC BLOOD PRESSURE: 131 MMHG | HEART RATE: 70 BPM | BODY MASS INDEX: 26.2 KG/M2 | WEIGHT: 163 LBS

## 2025-08-18 PROCEDURE — 64635 DESTROY LUMB/SAC FACET JNT: CPT | Performed by: PAIN MEDICINE

## 2025-08-18 PROCEDURE — 6360000002 HC RX W HCPCS: Performed by: PAIN MEDICINE

## 2025-08-18 PROCEDURE — 3600000057 HC PAIN LEVEL 4 ADDL 15 MIN: Performed by: PAIN MEDICINE

## 2025-08-18 PROCEDURE — 7100000010 HC PHASE II RECOVERY - FIRST 15 MIN: Performed by: PAIN MEDICINE

## 2025-08-18 PROCEDURE — 99152 MOD SED SAME PHYS/QHP 5/>YRS: CPT | Performed by: PAIN MEDICINE

## 2025-08-18 PROCEDURE — 64636 DESTROY L/S FACET JNT ADDL: CPT | Performed by: PAIN MEDICINE

## 2025-08-18 PROCEDURE — 7100000011 HC PHASE II RECOVERY - ADDTL 15 MIN: Performed by: PAIN MEDICINE

## 2025-08-18 PROCEDURE — 3600000056 HC PAIN LEVEL 4 BASE: Performed by: PAIN MEDICINE

## 2025-08-18 PROCEDURE — 2709999900 HC NON-CHARGEABLE SUPPLY: Performed by: PAIN MEDICINE

## 2025-08-18 RX ORDER — FENTANYL CITRATE 50 UG/ML
INJECTION, SOLUTION INTRAMUSCULAR; INTRAVENOUS PRN
Status: DISCONTINUED | OUTPATIENT
Start: 2025-08-18 | End: 2025-08-18 | Stop reason: ALTCHOICE

## 2025-08-18 RX ORDER — BUPIVACAINE HYDROCHLORIDE 5 MG/ML
INJECTION, SOLUTION PERINEURAL PRN
Status: DISCONTINUED | OUTPATIENT
Start: 2025-08-18 | End: 2025-08-18 | Stop reason: ALTCHOICE

## 2025-08-18 RX ORDER — MIDAZOLAM HYDROCHLORIDE 1 MG/ML
INJECTION, SOLUTION INTRAMUSCULAR; INTRAVENOUS PRN
Status: DISCONTINUED | OUTPATIENT
Start: 2025-08-18 | End: 2025-08-18 | Stop reason: ALTCHOICE

## 2025-08-18 RX ORDER — LIDOCAINE HYDROCHLORIDE 20 MG/ML
INJECTION, SOLUTION INFILTRATION; PERINEURAL PRN
Status: DISCONTINUED | OUTPATIENT
Start: 2025-08-18 | End: 2025-08-18 | Stop reason: ALTCHOICE

## 2025-08-18 ASSESSMENT — PAIN - FUNCTIONAL ASSESSMENT
PAIN_FUNCTIONAL_ASSESSMENT: 0-10
PAIN_FUNCTIONAL_ASSESSMENT: 0-10

## 2025-08-18 ASSESSMENT — PAIN DESCRIPTION - DESCRIPTORS: DESCRIPTORS: ACHING

## 2025-08-18 ASSESSMENT — PAIN SCALES - GENERAL: PAINLEVEL_OUTOF10: 6

## (undated) DEVICE — HYPODERMIC SAFETY NEEDLE: Brand: MAGELLAN

## (undated) DEVICE — NEEDLE SYR 18GA L1.5IN RED PLAS HUB S STL BLNT FILL W/O

## (undated) DEVICE — GLOVE ORANGE PI 7 1/2   MSG9075

## (undated) DEVICE — Device

## (undated) DEVICE — GAUZE SPONGES,USP TYPE VII GAUZE, 12 PLY: Brand: CURITY

## (undated) DEVICE — MARKER,SKIN,WI/RULER AND LABELS: Brand: MEDLINE

## (undated) DEVICE — TOWEL,OR,DSP,ST,BLUE,STD,4/PK,20PK/CS: Brand: MEDLINE

## (undated) DEVICE — SYRINGE MED 5ML STD CLR PLAS LUERLOCK TIP N CTRL DISP

## (undated) DEVICE — SYRINGE MED 3ML CLR PLAS STD N CTRL LUERLOCK TIP DISP

## (undated) DEVICE — SHEET,DRAPE,3/4,53X77,STERILE: Brand: MEDLINE

## (undated) DEVICE — LABEL MED WHT OR W O INITIALS AND DATE SECT PRESOURCE

## (undated) DEVICE — NEEDLE SYRINGE 18GA L1.5IN RED PLAS HUB S STL BLNT FILL W/O

## (undated) DEVICE — GAUZE,SPONGE,4"X4",12PLY,STERILE,LF,2'S: Brand: MEDLINE

## (undated) DEVICE — 6 ML SYRINGE LUER-LOCK TIP: Brand: MONOJECT

## (undated) DEVICE — SYRINGE MED 10ML LUERLOCK TIP W/O SFTY DISP

## (undated) DEVICE — NEEDLE SPNL 22GA L3.5IN BLK HUB S STL REG WALL FIT STYL W/

## (undated) DEVICE — 3 ML SYRINGE LUER-LOCK TIP: Brand: MONOJECT

## (undated) DEVICE — GLOVE ORANGE PI 7   MSG9070

## (undated) DEVICE — ZINACTIVE USE 2537982 PAD GRND FOR RF PAIN MGMT DISP

## (undated) DEVICE — SYRINGE MEDICAL 3ML CLEAR PLASTIC STANDARD NON CONTROL LUERLOCK TIP DISPOSABLE

## (undated) DEVICE — TOWEL OR BLUEE 16X26IN ST 8 PACK ORB08 16X26ORTWL

## (undated) DEVICE — KIT ABLATION RF MULTI-COOLED 17GA 5CM

## (undated) DEVICE — SHEET,DRAPE,53X77,STERILE: Brand: MEDLINE

## (undated) DEVICE — APPLICATOR MEDICATED 26 CC SOLUTION HI LT ORNG CHLORAPREP

## (undated) DEVICE — CHLORAPREP 26ML CLEAR

## (undated) DEVICE — SYRINGE FLSH 6ML BOLD GRAD 0.2ML LUERLOCK TIP ULT SHRP TRI

## (undated) DEVICE — NEEDLE SPNL 22GA L35IN PNCL PNT ATRAUM TIP PENCAN

## (undated) DEVICE — NEEDLE HYPO 25GA L1.5IN ROBUST SFTY SHLD SELF LEVELING SHTH

## (undated) DEVICE — NEEDLE SPNL 22GA L3.5IN BLK HUB S STL REG WALL FIT STYL

## (undated) DEVICE — SYRINGE MED 5ML POLYPR GEN PURP FLAT TOP LUERLOCK TIP

## (undated) DEVICE — LABEL MED DRUG CUST

## (undated) DEVICE — SC PAIN PACK: Brand: MEDLINE INDUSTRIES, INC.

## (undated) DEVICE — GLOVE ORANGE PI 8 1/2   MSG9085